# Patient Record
Sex: FEMALE | NOT HISPANIC OR LATINO | Employment: OTHER | URBAN - METROPOLITAN AREA
[De-identification: names, ages, dates, MRNs, and addresses within clinical notes are randomized per-mention and may not be internally consistent; named-entity substitution may affect disease eponyms.]

---

## 2017-03-07 ENCOUNTER — OFFICE VISIT - HEALTHEAST (OUTPATIENT)
Dept: FAMILY MEDICINE | Facility: CLINIC | Age: 82
End: 2017-03-07

## 2017-03-07 DIAGNOSIS — E11.9 TYPE 2 DIABETES MELLITUS (H): ICD-10-CM

## 2017-03-07 DIAGNOSIS — I10 ESSENTIAL HYPERTENSION, BENIGN: ICD-10-CM

## 2017-03-07 DIAGNOSIS — K21.9 GERD (GASTROESOPHAGEAL REFLUX DISEASE): ICD-10-CM

## 2017-03-07 DIAGNOSIS — E78.5 HYPERLIPIDEMIA: ICD-10-CM

## 2017-03-07 LAB — HBA1C MFR BLD: 6.5 % (ref 3.5–6)

## 2017-03-09 ENCOUNTER — COMMUNICATION - HEALTHEAST (OUTPATIENT)
Dept: FAMILY MEDICINE | Facility: CLINIC | Age: 82
End: 2017-03-09

## 2017-03-21 ENCOUNTER — RECORDS - HEALTHEAST (OUTPATIENT)
Dept: ADMINISTRATIVE | Facility: OTHER | Age: 82
End: 2017-03-21

## 2017-04-14 ENCOUNTER — COMMUNICATION - HEALTHEAST (OUTPATIENT)
Dept: TELEHEALTH | Facility: CLINIC | Age: 82
End: 2017-04-14

## 2017-04-14 ENCOUNTER — RECORDS - HEALTHEAST (OUTPATIENT)
Dept: GENERAL RADIOLOGY | Facility: CLINIC | Age: 82
End: 2017-04-14

## 2017-04-14 ENCOUNTER — OFFICE VISIT - HEALTHEAST (OUTPATIENT)
Dept: FAMILY MEDICINE | Facility: CLINIC | Age: 82
End: 2017-04-14

## 2017-04-14 DIAGNOSIS — M25.542 PAIN IN THUMB JOINT WITH MOVEMENT, LEFT: ICD-10-CM

## 2017-04-14 DIAGNOSIS — M79.645 PAIN IN LEFT FINGER(S): ICD-10-CM

## 2017-04-17 ENCOUNTER — COMMUNICATION - HEALTHEAST (OUTPATIENT)
Dept: FAMILY MEDICINE | Facility: CLINIC | Age: 82
End: 2017-04-17

## 2017-05-09 ENCOUNTER — OFFICE VISIT - HEALTHEAST (OUTPATIENT)
Dept: FAMILY MEDICINE | Facility: CLINIC | Age: 82
End: 2017-05-09

## 2017-05-09 ENCOUNTER — COMMUNICATION - HEALTHEAST (OUTPATIENT)
Dept: FAMILY MEDICINE | Facility: CLINIC | Age: 82
End: 2017-05-09

## 2017-05-09 DIAGNOSIS — M79.645 THUMB PAIN, LEFT: ICD-10-CM

## 2017-09-14 ENCOUNTER — COMMUNICATION - HEALTHEAST (OUTPATIENT)
Dept: FAMILY MEDICINE | Facility: CLINIC | Age: 82
End: 2017-09-14

## 2017-09-14 DIAGNOSIS — K21.9 GERD (GASTROESOPHAGEAL REFLUX DISEASE): ICD-10-CM

## 2017-12-12 ENCOUNTER — OFFICE VISIT - HEALTHEAST (OUTPATIENT)
Dept: FAMILY MEDICINE | Facility: CLINIC | Age: 82
End: 2017-12-12

## 2017-12-12 DIAGNOSIS — H57.89 EYE REDNESS: ICD-10-CM

## 2017-12-12 ASSESSMENT — MIFFLIN-ST. JEOR: SCORE: 833.6

## 2017-12-18 ENCOUNTER — COMMUNICATION - HEALTHEAST (OUTPATIENT)
Dept: FAMILY MEDICINE | Facility: CLINIC | Age: 82
End: 2017-12-18

## 2017-12-18 DIAGNOSIS — I10 ESSENTIAL HYPERTENSION, BENIGN: ICD-10-CM

## 2017-12-18 DIAGNOSIS — E11.9 DIABETES (H): ICD-10-CM

## 2017-12-18 DIAGNOSIS — E78.5 HYPERLIPIDEMIA: ICD-10-CM

## 2018-02-06 ENCOUNTER — COMMUNICATION - HEALTHEAST (OUTPATIENT)
Dept: FAMILY MEDICINE | Facility: CLINIC | Age: 83
End: 2018-02-06

## 2018-02-06 ENCOUNTER — OFFICE VISIT - HEALTHEAST (OUTPATIENT)
Dept: FAMILY MEDICINE | Facility: CLINIC | Age: 83
End: 2018-02-06

## 2018-02-06 DIAGNOSIS — E78.5 HYPERLIPIDEMIA, UNSPECIFIED HYPERLIPIDEMIA TYPE: ICD-10-CM

## 2018-02-06 DIAGNOSIS — K21.9 GASTROESOPHAGEAL REFLUX DISEASE WITHOUT ESOPHAGITIS: ICD-10-CM

## 2018-02-06 DIAGNOSIS — I25.10 CORONARY ATHEROSCLEROSIS: ICD-10-CM

## 2018-02-06 DIAGNOSIS — I10 ESSENTIAL HYPERTENSION, BENIGN: ICD-10-CM

## 2018-02-06 DIAGNOSIS — Z00.00 PREVENTATIVE HEALTH CARE: ICD-10-CM

## 2018-02-06 DIAGNOSIS — E11.9 DIABETES (H): ICD-10-CM

## 2018-02-06 DIAGNOSIS — E11.9 TYPE 2 DIABETES MELLITUS (H): ICD-10-CM

## 2018-02-06 DIAGNOSIS — M81.0 OSTEOPOROSIS: ICD-10-CM

## 2018-02-06 DIAGNOSIS — K21.00 REFLUX ESOPHAGITIS: ICD-10-CM

## 2018-02-06 DIAGNOSIS — K21.9 GERD (GASTROESOPHAGEAL REFLUX DISEASE): ICD-10-CM

## 2018-02-06 LAB
25(OH)D3 SERPL-MCNC: 25.8 NG/ML (ref 30–80)
ALBUMIN SERPL-MCNC: 3.3 G/DL (ref 3.5–5)
ALP SERPL-CCNC: 43 U/L (ref 45–120)
ALT SERPL W P-5'-P-CCNC: 16 U/L (ref 0–45)
ANION GAP SERPL CALCULATED.3IONS-SCNC: 11 MMOL/L (ref 5–18)
AST SERPL W P-5'-P-CCNC: 20 U/L (ref 0–40)
BILIRUB SERPL-MCNC: 0.4 MG/DL (ref 0–1)
BUN SERPL-MCNC: 16 MG/DL (ref 8–28)
CALCIUM SERPL-MCNC: 9.3 MG/DL (ref 8.5–10.5)
CHLORIDE BLD-SCNC: 104 MMOL/L (ref 98–107)
CHOLEST SERPL-MCNC: 130 MG/DL
CO2 SERPL-SCNC: 26 MMOL/L (ref 22–31)
CREAT SERPL-MCNC: 1 MG/DL (ref 0.6–1.1)
CREAT UR-MCNC: 94.5 MG/DL
FASTING STATUS PATIENT QL REPORTED: NO
GFR SERPL CREATININE-BSD FRML MDRD: 53 ML/MIN/1.73M2
GLUCOSE BLD-MCNC: 142 MG/DL (ref 70–125)
HBA1C MFR BLD: 6.4 % (ref 3.5–6)
HDLC SERPL-MCNC: 43 MG/DL
LDLC SERPL CALC-MCNC: 60 MG/DL
MICROALBUMIN UR-MCNC: 1.46 MG/DL (ref 0–1.99)
MICROALBUMIN/CREAT UR: 15.4 MG/G
POTASSIUM BLD-SCNC: 4.2 MMOL/L (ref 3.5–5)
PROT SERPL-MCNC: 7.7 G/DL (ref 6–8)
SODIUM SERPL-SCNC: 141 MMOL/L (ref 136–145)
TRIGL SERPL-MCNC: 134 MG/DL

## 2018-08-06 ENCOUNTER — OFFICE VISIT - HEALTHEAST (OUTPATIENT)
Dept: FAMILY MEDICINE | Facility: CLINIC | Age: 83
End: 2018-08-06

## 2018-08-06 DIAGNOSIS — I10 ESSENTIAL HYPERTENSION, BENIGN: ICD-10-CM

## 2018-08-06 DIAGNOSIS — E11.9 TYPE 2 DIABETES MELLITUS (H): ICD-10-CM

## 2018-08-06 DIAGNOSIS — K21.9 GASTROESOPHAGEAL REFLUX DISEASE WITHOUT ESOPHAGITIS: ICD-10-CM

## 2018-08-06 DIAGNOSIS — I25.83 CORONARY ATHEROSCLEROSIS DUE TO LIPID RICH PLAQUE: ICD-10-CM

## 2018-08-06 DIAGNOSIS — E78.5 HYPERLIPIDEMIA, UNSPECIFIED HYPERLIPIDEMIA TYPE: ICD-10-CM

## 2018-08-06 DIAGNOSIS — M81.0 OSTEOPOROSIS: ICD-10-CM

## 2018-08-06 DIAGNOSIS — H91.90 HARD OF HEARING: ICD-10-CM

## 2018-08-06 LAB — HBA1C MFR BLD: 7.4 % (ref 3.5–6)

## 2018-08-07 LAB
ANION GAP SERPL CALCULATED.3IONS-SCNC: 9 MMOL/L (ref 5–18)
BUN SERPL-MCNC: 13 MG/DL (ref 8–28)
CALCIUM SERPL-MCNC: 9.7 MG/DL (ref 8.5–10.5)
CHLORIDE BLD-SCNC: 106 MMOL/L (ref 98–107)
CHOLEST SERPL-MCNC: 141 MG/DL
CO2 SERPL-SCNC: 27 MMOL/L (ref 22–31)
CREAT SERPL-MCNC: 1.03 MG/DL (ref 0.6–1.1)
FASTING STATUS PATIENT QL REPORTED: NO
GFR SERPL CREATININE-BSD FRML MDRD: 51 ML/MIN/1.73M2
GLUCOSE BLD-MCNC: 152 MG/DL (ref 70–125)
HDLC SERPL-MCNC: 40 MG/DL
LDLC SERPL CALC-MCNC: 65 MG/DL
POTASSIUM BLD-SCNC: 4.4 MMOL/L (ref 3.5–5)
SODIUM SERPL-SCNC: 142 MMOL/L (ref 136–145)
TRIGL SERPL-MCNC: 180 MG/DL

## 2018-08-08 LAB — 25(OH)D3 SERPL-MCNC: 81.1 NG/ML (ref 30–80)

## 2018-08-10 ENCOUNTER — COMMUNICATION - HEALTHEAST (OUTPATIENT)
Dept: FAMILY MEDICINE | Facility: CLINIC | Age: 83
End: 2018-08-10

## 2018-08-13 ENCOUNTER — COMMUNICATION - HEALTHEAST (OUTPATIENT)
Dept: ADMINISTRATIVE | Facility: CLINIC | Age: 83
End: 2018-08-13

## 2018-09-05 ENCOUNTER — OFFICE VISIT - HEALTHEAST (OUTPATIENT)
Dept: FAMILY MEDICINE | Facility: CLINIC | Age: 83
End: 2018-09-05

## 2018-09-05 DIAGNOSIS — I10 ESSENTIAL HYPERTENSION, BENIGN: ICD-10-CM

## 2018-09-05 DIAGNOSIS — E11.9 DIABETES (H): ICD-10-CM

## 2018-09-05 DIAGNOSIS — E78.5 HYPERLIPIDEMIA, UNSPECIFIED HYPERLIPIDEMIA TYPE: ICD-10-CM

## 2018-09-05 DIAGNOSIS — M25.519 SHOULDER PAIN: ICD-10-CM

## 2018-09-05 DIAGNOSIS — K21.9 GASTROESOPHAGEAL REFLUX DISEASE WITHOUT ESOPHAGITIS: ICD-10-CM

## 2018-09-05 ASSESSMENT — MIFFLIN-ST. JEOR: SCORE: 866.49

## 2018-10-03 ENCOUNTER — OFFICE VISIT - HEALTHEAST (OUTPATIENT)
Dept: FAMILY MEDICINE | Facility: CLINIC | Age: 83
End: 2018-10-03

## 2018-10-03 DIAGNOSIS — R05.9 COUGH: ICD-10-CM

## 2018-10-03 DIAGNOSIS — H61.21 IMPACTED CERUMEN OF RIGHT EAR: ICD-10-CM

## 2018-10-03 DIAGNOSIS — R53.83 FATIGUE: ICD-10-CM

## 2018-10-03 LAB
BASOPHILS # BLD AUTO: 0 THOU/UL (ref 0–0.2)
BASOPHILS NFR BLD AUTO: 0 % (ref 0–2)
EOSINOPHIL # BLD AUTO: 0.2 THOU/UL (ref 0–0.4)
EOSINOPHIL NFR BLD AUTO: 3 % (ref 0–6)
ERYTHROCYTE [DISTWIDTH] IN BLOOD BY AUTOMATED COUNT: 17.6 % (ref 11–14.5)
HCT VFR BLD AUTO: 31.8 % (ref 35–47)
HGB BLD-MCNC: 10.1 G/DL (ref 12–16)
LYMPHOCYTES # BLD AUTO: 1.7 THOU/UL (ref 0.8–4.4)
LYMPHOCYTES NFR BLD AUTO: 27 % (ref 20–40)
MCH RBC QN AUTO: 21.3 PG (ref 27–34)
MCHC RBC AUTO-ENTMCNC: 31.7 G/DL (ref 32–36)
MCV RBC AUTO: 67 FL (ref 80–100)
MONOCYTES # BLD AUTO: 0.6 THOU/UL (ref 0–0.9)
MONOCYTES NFR BLD AUTO: 10 % (ref 2–10)
NEUTROPHILS # BLD AUTO: 3.8 THOU/UL (ref 2–7.7)
NEUTROPHILS NFR BLD AUTO: 60 % (ref 50–70)
PLATELET # BLD AUTO: 254 THOU/UL (ref 140–440)
PMV BLD AUTO: 7.6 FL (ref 7–10)
RBC # BLD AUTO: 4.71 MILL/UL (ref 3.8–5.4)
WBC: 6.4 THOU/UL (ref 4–11)

## 2018-11-20 ENCOUNTER — OFFICE VISIT - HEALTHEAST (OUTPATIENT)
Dept: FAMILY MEDICINE | Facility: CLINIC | Age: 83
End: 2018-11-20

## 2018-11-20 ENCOUNTER — COMMUNICATION - HEALTHEAST (OUTPATIENT)
Dept: FAMILY MEDICINE | Facility: CLINIC | Age: 83
End: 2018-11-20

## 2018-11-20 DIAGNOSIS — E78.5 HYPERLIPIDEMIA, UNSPECIFIED HYPERLIPIDEMIA TYPE: ICD-10-CM

## 2018-11-20 DIAGNOSIS — R42 DIZZY SPELLS: ICD-10-CM

## 2018-11-20 DIAGNOSIS — H92.02 LEFT EAR PAIN: ICD-10-CM

## 2018-11-20 DIAGNOSIS — I25.83 CORONARY ATHEROSCLEROSIS DUE TO LIPID RICH PLAQUE: ICD-10-CM

## 2018-11-20 DIAGNOSIS — I10 ESSENTIAL HYPERTENSION, BENIGN: ICD-10-CM

## 2018-11-20 DIAGNOSIS — E11.9 TYPE 2 DIABETES MELLITUS WITHOUT COMPLICATION, WITHOUT LONG-TERM CURRENT USE OF INSULIN (H): ICD-10-CM

## 2018-11-20 LAB
ALBUMIN SERPL-MCNC: 3.2 G/DL (ref 3.5–5)
ALP SERPL-CCNC: 49 U/L (ref 45–120)
ALT SERPL W P-5'-P-CCNC: 22 U/L (ref 0–45)
ANION GAP SERPL CALCULATED.3IONS-SCNC: 11 MMOL/L (ref 5–18)
AST SERPL W P-5'-P-CCNC: 27 U/L (ref 0–40)
BASOPHILS # BLD AUTO: 0 THOU/UL (ref 0–0.2)
BASOPHILS NFR BLD AUTO: 0 % (ref 0–2)
BILIRUB SERPL-MCNC: 0.4 MG/DL (ref 0–1)
BUN SERPL-MCNC: 12 MG/DL (ref 8–28)
CALCIUM SERPL-MCNC: 9.5 MG/DL (ref 8.5–10.5)
CHLORIDE BLD-SCNC: 104 MMOL/L (ref 98–107)
CO2 SERPL-SCNC: 25 MMOL/L (ref 22–31)
CREAT SERPL-MCNC: 0.93 MG/DL (ref 0.6–1.1)
EOSINOPHIL # BLD AUTO: 0.1 THOU/UL (ref 0–0.4)
EOSINOPHIL NFR BLD AUTO: 2 % (ref 0–6)
ERYTHROCYTE [DISTWIDTH] IN BLOOD BY AUTOMATED COUNT: 17.8 % (ref 11–14.5)
ERYTHROCYTE [SEDIMENTATION RATE] IN BLOOD BY WESTERGREN METHOD: 25 MM/HR (ref 0–20)
GFR SERPL CREATININE-BSD FRML MDRD: 58 ML/MIN/1.73M2
GLUCOSE BLD-MCNC: 205 MG/DL (ref 70–125)
HBA1C MFR BLD: 7.3 % (ref 3.5–6)
HCT VFR BLD AUTO: 30.7 % (ref 35–47)
HGB BLD-MCNC: 9.5 G/DL (ref 12–16)
LYMPHOCYTES # BLD AUTO: 1.4 THOU/UL (ref 0.8–4.4)
LYMPHOCYTES NFR BLD AUTO: 32 % (ref 20–40)
MCH RBC QN AUTO: 20.9 PG (ref 27–34)
MCHC RBC AUTO-ENTMCNC: 30.8 G/DL (ref 32–36)
MCV RBC AUTO: 68 FL (ref 80–100)
MONOCYTES # BLD AUTO: 0.3 THOU/UL (ref 0–0.9)
MONOCYTES NFR BLD AUTO: 7 % (ref 2–10)
NEUTROPHILS # BLD AUTO: 2.6 THOU/UL (ref 2–7.7)
NEUTROPHILS NFR BLD AUTO: 59 % (ref 50–70)
PLATELET # BLD AUTO: 232 THOU/UL (ref 140–440)
PMV BLD AUTO: 7.9 FL (ref 7–10)
POTASSIUM BLD-SCNC: 4.3 MMOL/L (ref 3.5–5)
PROT SERPL-MCNC: 7.8 G/DL (ref 6–8)
RBC # BLD AUTO: 4.52 MILL/UL (ref 3.8–5.4)
SODIUM SERPL-SCNC: 140 MMOL/L (ref 136–145)
WBC: 4.4 THOU/UL (ref 4–11)

## 2018-11-28 ENCOUNTER — OFFICE VISIT - HEALTHEAST (OUTPATIENT)
Dept: FAMILY MEDICINE | Facility: CLINIC | Age: 83
End: 2018-11-28

## 2018-11-28 DIAGNOSIS — K05.219 GUM ABSCESS: ICD-10-CM

## 2018-11-28 DIAGNOSIS — H91.93 BILATERAL HEARING LOSS, UNSPECIFIED HEARING LOSS TYPE: ICD-10-CM

## 2018-11-28 DIAGNOSIS — K13.79 ORAL PAIN: ICD-10-CM

## 2018-11-28 ASSESSMENT — MIFFLIN-ST. JEOR: SCORE: 860.25

## 2018-12-02 ENCOUNTER — COMMUNICATION - HEALTHEAST (OUTPATIENT)
Dept: FAMILY MEDICINE | Facility: CLINIC | Age: 83
End: 2018-12-02

## 2018-12-02 DIAGNOSIS — I25.83 CORONARY ATHEROSCLEROSIS DUE TO LIPID RICH PLAQUE: ICD-10-CM

## 2018-12-04 ENCOUNTER — OFFICE VISIT - HEALTHEAST (OUTPATIENT)
Dept: AUDIOLOGY | Facility: CLINIC | Age: 83
End: 2018-12-04

## 2018-12-04 DIAGNOSIS — H90.3 SENSORINEURAL HEARING LOSS, BILATERAL: ICD-10-CM

## 2019-03-13 ENCOUNTER — COMMUNICATION - HEALTHEAST (OUTPATIENT)
Dept: FAMILY MEDICINE | Facility: CLINIC | Age: 84
End: 2019-03-13

## 2019-03-13 DIAGNOSIS — I10 ESSENTIAL HYPERTENSION, BENIGN: ICD-10-CM

## 2019-03-13 DIAGNOSIS — E78.5 HYPERLIPIDEMIA, UNSPECIFIED HYPERLIPIDEMIA TYPE: ICD-10-CM

## 2019-03-13 DIAGNOSIS — E11.9 DIABETES (H): ICD-10-CM

## 2019-04-09 ENCOUNTER — OFFICE VISIT - HEALTHEAST (OUTPATIENT)
Dept: FAMILY MEDICINE | Facility: CLINIC | Age: 84
End: 2019-04-09

## 2019-04-09 DIAGNOSIS — E11.9 TYPE 2 DIABETES MELLITUS WITHOUT COMPLICATION, WITHOUT LONG-TERM CURRENT USE OF INSULIN (H): ICD-10-CM

## 2019-04-09 DIAGNOSIS — K21.9 GASTROESOPHAGEAL REFLUX DISEASE WITHOUT ESOPHAGITIS: ICD-10-CM

## 2019-04-09 DIAGNOSIS — I25.83 CORONARY ATHEROSCLEROSIS DUE TO LIPID RICH PLAQUE: ICD-10-CM

## 2019-04-09 DIAGNOSIS — I10 ESSENTIAL HYPERTENSION, BENIGN: ICD-10-CM

## 2019-04-09 DIAGNOSIS — E78.5 HYPERLIPIDEMIA, UNSPECIFIED HYPERLIPIDEMIA TYPE: ICD-10-CM

## 2019-04-09 LAB
CREAT UR-MCNC: 112.8 MG/DL
ERYTHROCYTE [DISTWIDTH] IN BLOOD BY AUTOMATED COUNT: 18.1 % (ref 11–14.5)
HBA1C MFR BLD: 8 % (ref 3.5–6)
HCT VFR BLD AUTO: 30 % (ref 35–47)
HGB BLD-MCNC: 9.1 G/DL (ref 12–16)
MCH RBC QN AUTO: 21.2 PG (ref 27–34)
MCHC RBC AUTO-ENTMCNC: 30.3 G/DL (ref 32–36)
MCV RBC AUTO: 70 FL (ref 80–100)
MICROALBUMIN UR-MCNC: 1.02 MG/DL (ref 0–1.99)
MICROALBUMIN/CREAT UR: 9 MG/G
PLATELET # BLD AUTO: 244 THOU/UL (ref 140–440)
PMV BLD AUTO: 8.1 FL (ref 7–10)
RBC # BLD AUTO: 4.28 MILL/UL (ref 3.8–5.4)
WBC: 5.9 THOU/UL (ref 4–11)

## 2019-04-10 LAB
ANION GAP SERPL CALCULATED.3IONS-SCNC: 8 MMOL/L (ref 5–18)
BUN SERPL-MCNC: 12 MG/DL (ref 8–28)
CALCIUM SERPL-MCNC: 9.4 MG/DL (ref 8.5–10.5)
CHLORIDE BLD-SCNC: 105 MMOL/L (ref 98–107)
CHOLEST SERPL-MCNC: 134 MG/DL
CO2 SERPL-SCNC: 26 MMOL/L (ref 22–31)
CREAT SERPL-MCNC: 1.05 MG/DL (ref 0.6–1.1)
FASTING STATUS PATIENT QL REPORTED: NO
GFR SERPL CREATININE-BSD FRML MDRD: 50 ML/MIN/1.73M2
GLUCOSE BLD-MCNC: 179 MG/DL (ref 70–125)
HDLC SERPL-MCNC: 34 MG/DL
LDLC SERPL CALC-MCNC: 47 MG/DL
POTASSIUM BLD-SCNC: 4.1 MMOL/L (ref 3.5–5)
SODIUM SERPL-SCNC: 139 MMOL/L (ref 136–145)
TRIGL SERPL-MCNC: 264 MG/DL

## 2019-04-11 ENCOUNTER — COMMUNICATION - HEALTHEAST (OUTPATIENT)
Dept: FAMILY MEDICINE | Facility: CLINIC | Age: 84
End: 2019-04-11

## 2019-05-31 ENCOUNTER — COMMUNICATION - HEALTHEAST (OUTPATIENT)
Dept: FAMILY MEDICINE | Facility: CLINIC | Age: 84
End: 2019-05-31

## 2019-06-14 ENCOUNTER — OFFICE VISIT - HEALTHEAST (OUTPATIENT)
Dept: FAMILY MEDICINE | Facility: CLINIC | Age: 84
End: 2019-06-14

## 2019-06-14 ENCOUNTER — HOSPITAL ENCOUNTER (OUTPATIENT)
Dept: CT IMAGING | Facility: CLINIC | Age: 84
Discharge: HOME | End: 2019-06-14
Attending: FAMILY MEDICINE

## 2019-06-14 DIAGNOSIS — R19.8 INCREASED ABDOMINAL GIRTH: ICD-10-CM

## 2019-06-14 DIAGNOSIS — D50.0 IRON DEFICIENCY ANEMIA DUE TO CHRONIC BLOOD LOSS: ICD-10-CM

## 2019-06-14 DIAGNOSIS — I25.83 CORONARY ATHEROSCLEROSIS DUE TO LIPID RICH PLAQUE: ICD-10-CM

## 2019-06-14 DIAGNOSIS — E11.9 TYPE 2 DIABETES MELLITUS WITHOUT COMPLICATION, WITHOUT LONG-TERM CURRENT USE OF INSULIN (H): ICD-10-CM

## 2019-06-14 DIAGNOSIS — K21.9 GASTROESOPHAGEAL REFLUX DISEASE WITHOUT ESOPHAGITIS: ICD-10-CM

## 2019-06-14 DIAGNOSIS — K74.60 CIRRHOSIS OF LIVER WITHOUT ASCITES, UNSPECIFIED HEPATIC CIRRHOSIS TYPE (H): ICD-10-CM

## 2019-06-14 DIAGNOSIS — E78.5 HYPERLIPIDEMIA, UNSPECIFIED HYPERLIPIDEMIA TYPE: ICD-10-CM

## 2019-06-14 DIAGNOSIS — R19.4 CHANGE IN STOOL HABITS: ICD-10-CM

## 2019-06-14 DIAGNOSIS — I10 ESSENTIAL HYPERTENSION, BENIGN: ICD-10-CM

## 2019-06-14 LAB
CREAT BLD-MCNC: 0.8 MG/DL (ref 0.6–1.1)
ERYTHROCYTE [DISTWIDTH] IN BLOOD BY AUTOMATED COUNT: 20.7 % (ref 11–14.5)
GFR SERPL CREATININE-BSD FRML MDRD: >60 ML/MIN/1.73M2
HBA1C MFR BLD: 7.5 % (ref 3.5–6)
HCT VFR BLD AUTO: 31.7 % (ref 35–47)
HGB BLD-MCNC: 9.3 G/DL (ref 12–16)
MCH RBC QN AUTO: 20.8 PG (ref 27–34)
MCHC RBC AUTO-ENTMCNC: 29.3 G/DL (ref 32–36)
MCV RBC AUTO: 71 FL (ref 80–100)
PLATELET # BLD AUTO: 218 THOU/UL (ref 140–440)
PMV BLD AUTO: 9.6 FL (ref 8.5–12.5)
RBC # BLD AUTO: 4.48 MILL/UL (ref 3.8–5.4)
WBC: 6.2 THOU/UL (ref 4–11)

## 2019-06-14 ASSESSMENT — MIFFLIN-ST. JEOR: SCORE: 863.66

## 2019-06-16 ENCOUNTER — COMMUNICATION - HEALTHEAST (OUTPATIENT)
Dept: FAMILY MEDICINE | Facility: CLINIC | Age: 84
End: 2019-06-16

## 2019-06-17 ENCOUNTER — AMBULATORY - HEALTHEAST (OUTPATIENT)
Dept: FAMILY MEDICINE | Facility: CLINIC | Age: 84
End: 2019-06-17

## 2019-06-17 DIAGNOSIS — K59.00 CONSTIPATION, UNSPECIFIED CONSTIPATION TYPE: ICD-10-CM

## 2019-08-02 ENCOUNTER — OFFICE VISIT - HEALTHEAST (OUTPATIENT)
Dept: FAMILY MEDICINE | Facility: CLINIC | Age: 84
End: 2019-08-02

## 2019-08-02 DIAGNOSIS — E11.9 TYPE 2 DIABETES MELLITUS WITHOUT COMPLICATION, WITHOUT LONG-TERM CURRENT USE OF INSULIN (H): ICD-10-CM

## 2019-08-02 DIAGNOSIS — H57.12 LEFT EYE PAIN: ICD-10-CM

## 2019-08-12 ENCOUNTER — RECORDS - HEALTHEAST (OUTPATIENT)
Dept: ADMINISTRATIVE | Facility: OTHER | Age: 84
End: 2019-08-12

## 2019-10-26 ENCOUNTER — AMBULATORY - HEALTHEAST (OUTPATIENT)
Dept: NURSING | Facility: CLINIC | Age: 84
End: 2019-10-26

## 2019-10-26 DIAGNOSIS — Z23 NEED FOR VACCINATION: ICD-10-CM

## 2019-11-01 ENCOUNTER — OFFICE VISIT - HEALTHEAST (OUTPATIENT)
Dept: FAMILY MEDICINE | Facility: CLINIC | Age: 84
End: 2019-11-01

## 2019-11-01 DIAGNOSIS — I10 ESSENTIAL HYPERTENSION, BENIGN: ICD-10-CM

## 2019-11-01 DIAGNOSIS — M81.0 AGE-RELATED OSTEOPOROSIS WITHOUT CURRENT PATHOLOGICAL FRACTURE: ICD-10-CM

## 2019-11-01 DIAGNOSIS — I25.83 CORONARY ATHEROSCLEROSIS DUE TO LIPID RICH PLAQUE: ICD-10-CM

## 2019-11-01 DIAGNOSIS — E11.9 TYPE 2 DIABETES MELLITUS WITHOUT COMPLICATION, WITHOUT LONG-TERM CURRENT USE OF INSULIN (H): ICD-10-CM

## 2019-11-01 DIAGNOSIS — E78.5 HYPERLIPIDEMIA, UNSPECIFIED HYPERLIPIDEMIA TYPE: ICD-10-CM

## 2019-11-01 DIAGNOSIS — K21.9 GASTROESOPHAGEAL REFLUX DISEASE WITHOUT ESOPHAGITIS: ICD-10-CM

## 2019-11-01 LAB
ANION GAP SERPL CALCULATED.3IONS-SCNC: 9 MMOL/L (ref 5–18)
BUN SERPL-MCNC: 12 MG/DL (ref 8–28)
CALCIUM SERPL-MCNC: 9.7 MG/DL (ref 8.5–10.5)
CHLORIDE BLD-SCNC: 105 MMOL/L (ref 98–107)
CHOLEST SERPL-MCNC: 129 MG/DL
CO2 SERPL-SCNC: 27 MMOL/L (ref 22–31)
CREAT SERPL-MCNC: 0.94 MG/DL (ref 0.6–1.1)
FASTING STATUS PATIENT QL REPORTED: NO
GFR SERPL CREATININE-BSD FRML MDRD: 57 ML/MIN/1.73M2
GLUCOSE BLD-MCNC: 135 MG/DL (ref 70–125)
HBA1C MFR BLD: 7.7 % (ref 3.5–6)
HDLC SERPL-MCNC: 38 MG/DL
LDLC SERPL CALC-MCNC: 36 MG/DL
POTASSIUM BLD-SCNC: 4.3 MMOL/L (ref 3.5–5)
SODIUM SERPL-SCNC: 141 MMOL/L (ref 136–145)
TRIGL SERPL-MCNC: 276 MG/DL

## 2019-11-04 ENCOUNTER — COMMUNICATION - HEALTHEAST (OUTPATIENT)
Dept: FAMILY MEDICINE | Facility: CLINIC | Age: 84
End: 2019-11-04

## 2019-11-04 LAB — 25(OH)D3 SERPL-MCNC: 59.3 NG/ML (ref 30–80)

## 2019-11-05 ENCOUNTER — COMMUNICATION - HEALTHEAST (OUTPATIENT)
Dept: SCHEDULING | Facility: CLINIC | Age: 84
End: 2019-11-05

## 2019-11-05 ENCOUNTER — COMMUNICATION - HEALTHEAST (OUTPATIENT)
Dept: FAMILY MEDICINE | Facility: CLINIC | Age: 84
End: 2019-11-05

## 2019-11-05 DIAGNOSIS — K21.9 GASTROESOPHAGEAL REFLUX DISEASE WITHOUT ESOPHAGITIS: ICD-10-CM

## 2020-02-15 ENCOUNTER — COMMUNICATION - HEALTHEAST (OUTPATIENT)
Dept: FAMILY MEDICINE | Facility: CLINIC | Age: 85
End: 2020-02-15

## 2020-02-15 DIAGNOSIS — E78.5 HYPERLIPIDEMIA, UNSPECIFIED HYPERLIPIDEMIA TYPE: ICD-10-CM

## 2020-02-15 DIAGNOSIS — E11.9 DIABETES (H): ICD-10-CM

## 2020-02-15 DIAGNOSIS — I10 ESSENTIAL HYPERTENSION, BENIGN: ICD-10-CM

## 2020-02-28 ENCOUNTER — OFFICE VISIT - HEALTHEAST (OUTPATIENT)
Dept: FAMILY MEDICINE | Facility: CLINIC | Age: 85
End: 2020-02-28

## 2020-02-28 DIAGNOSIS — E11.9 TYPE 2 DIABETES MELLITUS WITHOUT COMPLICATION, WITHOUT LONG-TERM CURRENT USE OF INSULIN (H): ICD-10-CM

## 2020-02-28 DIAGNOSIS — I25.83 CORONARY ATHEROSCLEROSIS DUE TO LIPID RICH PLAQUE: ICD-10-CM

## 2020-02-28 DIAGNOSIS — I10 ESSENTIAL HYPERTENSION, BENIGN: ICD-10-CM

## 2020-02-28 DIAGNOSIS — J06.9 VIRAL UPPER RESPIRATORY TRACT INFECTION: ICD-10-CM

## 2020-02-28 LAB
CREAT UR-MCNC: 128.9 MG/DL
HBA1C MFR BLD: 7.5 % (ref 3.5–6)
MICROALBUMIN UR-MCNC: 1.77 MG/DL (ref 0–1.99)
MICROALBUMIN/CREAT UR: 13.7 MG/G

## 2020-02-29 ENCOUNTER — COMMUNICATION - HEALTHEAST (OUTPATIENT)
Dept: FAMILY MEDICINE | Facility: CLINIC | Age: 85
End: 2020-02-29

## 2020-05-08 ENCOUNTER — COMMUNICATION - HEALTHEAST (OUTPATIENT)
Dept: FAMILY MEDICINE | Facility: CLINIC | Age: 85
End: 2020-05-08

## 2020-05-08 DIAGNOSIS — K21.9 GASTROESOPHAGEAL REFLUX DISEASE WITHOUT ESOPHAGITIS: ICD-10-CM

## 2020-05-14 ENCOUNTER — COMMUNICATION - HEALTHEAST (OUTPATIENT)
Dept: FAMILY MEDICINE | Facility: CLINIC | Age: 85
End: 2020-05-14

## 2020-05-14 DIAGNOSIS — E11.9 DIABETES (H): ICD-10-CM

## 2020-05-14 DIAGNOSIS — E78.5 HYPERLIPIDEMIA, UNSPECIFIED HYPERLIPIDEMIA TYPE: ICD-10-CM

## 2020-07-09 ENCOUNTER — OFFICE VISIT - HEALTHEAST (OUTPATIENT)
Dept: FAMILY MEDICINE | Facility: CLINIC | Age: 85
End: 2020-07-09

## 2020-07-09 DIAGNOSIS — I25.83 CORONARY ATHEROSCLEROSIS DUE TO LIPID RICH PLAQUE: ICD-10-CM

## 2020-07-09 DIAGNOSIS — I10 ESSENTIAL HYPERTENSION, BENIGN: ICD-10-CM

## 2020-07-09 DIAGNOSIS — R06.02 SOB (SHORTNESS OF BREATH) ON EXERTION: ICD-10-CM

## 2020-07-09 DIAGNOSIS — E11.9 TYPE 2 DIABETES MELLITUS WITHOUT COMPLICATION, WITHOUT LONG-TERM CURRENT USE OF INSULIN (H): ICD-10-CM

## 2020-07-09 DIAGNOSIS — K21.9 GASTROESOPHAGEAL REFLUX DISEASE WITHOUT ESOPHAGITIS: ICD-10-CM

## 2020-07-09 DIAGNOSIS — E78.5 HYPERLIPIDEMIA, UNSPECIFIED HYPERLIPIDEMIA TYPE: ICD-10-CM

## 2020-07-09 LAB
BNP SERPL-MCNC: 128 PG/ML (ref 0–167)
HBA1C MFR BLD: 7.6 % (ref 3.5–6)

## 2020-07-09 ASSESSMENT — MIFFLIN-ST. JEOR: SCORE: 859.12

## 2020-07-10 LAB
ALBUMIN SERPL-MCNC: 3.4 G/DL (ref 3.5–5)
ALP SERPL-CCNC: 66 U/L (ref 45–120)
ALT SERPL W P-5'-P-CCNC: 25 U/L (ref 0–45)
ANION GAP SERPL CALCULATED.3IONS-SCNC: 12 MMOL/L (ref 5–18)
AST SERPL W P-5'-P-CCNC: 34 U/L (ref 0–40)
BILIRUB SERPL-MCNC: 0.4 MG/DL (ref 0–1)
BUN SERPL-MCNC: 13 MG/DL (ref 8–28)
CALCIUM SERPL-MCNC: 9.5 MG/DL (ref 8.5–10.5)
CHLORIDE BLD-SCNC: 104 MMOL/L (ref 98–107)
CHOLEST SERPL-MCNC: 133 MG/DL
CO2 SERPL-SCNC: 24 MMOL/L (ref 22–31)
CREAT SERPL-MCNC: 1.07 MG/DL (ref 0.6–1.1)
FASTING STATUS PATIENT QL REPORTED: NO
GFR SERPL CREATININE-BSD FRML MDRD: 49 ML/MIN/1.73M2
GLUCOSE BLD-MCNC: 169 MG/DL (ref 70–125)
HDLC SERPL-MCNC: 40 MG/DL
LDLC SERPL CALC-MCNC: 62 MG/DL
POTASSIUM BLD-SCNC: 4.4 MMOL/L (ref 3.5–5)
PROT SERPL-MCNC: 8 G/DL (ref 6–8)
SODIUM SERPL-SCNC: 140 MMOL/L (ref 136–145)
TRIGL SERPL-MCNC: 153 MG/DL

## 2020-07-11 ENCOUNTER — COMMUNICATION - HEALTHEAST (OUTPATIENT)
Dept: FAMILY MEDICINE | Facility: CLINIC | Age: 85
End: 2020-07-11

## 2020-09-12 ENCOUNTER — AMBULATORY - HEALTHEAST (OUTPATIENT)
Dept: NURSING | Facility: CLINIC | Age: 85
End: 2020-09-12

## 2020-10-19 ENCOUNTER — COMMUNICATION - HEALTHEAST (OUTPATIENT)
Dept: FAMILY MEDICINE | Facility: CLINIC | Age: 85
End: 2020-10-19

## 2020-10-19 DIAGNOSIS — I10 ESSENTIAL HYPERTENSION, BENIGN: ICD-10-CM

## 2020-11-03 ENCOUNTER — OFFICE VISIT - HEALTHEAST (OUTPATIENT)
Dept: FAMILY MEDICINE | Facility: CLINIC | Age: 85
End: 2020-11-03

## 2020-11-03 DIAGNOSIS — E11.9 TYPE 2 DIABETES MELLITUS WITHOUT COMPLICATION, WITHOUT LONG-TERM CURRENT USE OF INSULIN (H): ICD-10-CM

## 2020-11-03 DIAGNOSIS — K74.60 CIRRHOSIS OF LIVER WITHOUT ASCITES, UNSPECIFIED HEPATIC CIRRHOSIS TYPE (H): ICD-10-CM

## 2020-11-03 DIAGNOSIS — I25.83 CORONARY ATHEROSCLEROSIS DUE TO LIPID RICH PLAQUE: ICD-10-CM

## 2020-11-03 DIAGNOSIS — Z00.00 MEDICARE ANNUAL WELLNESS VISIT, SUBSEQUENT: ICD-10-CM

## 2020-11-03 DIAGNOSIS — K21.9 GASTROESOPHAGEAL REFLUX DISEASE WITHOUT ESOPHAGITIS: ICD-10-CM

## 2020-11-03 DIAGNOSIS — E78.5 HYPERLIPIDEMIA, UNSPECIFIED HYPERLIPIDEMIA TYPE: ICD-10-CM

## 2020-11-03 DIAGNOSIS — I10 ESSENTIAL HYPERTENSION, BENIGN: ICD-10-CM

## 2020-11-03 LAB — HBA1C MFR BLD: 7.8 %

## 2020-11-03 ASSESSMENT — MIFFLIN-ST. JEOR: SCORE: 847.78

## 2020-11-04 ENCOUNTER — COMMUNICATION - HEALTHEAST (OUTPATIENT)
Dept: FAMILY MEDICINE | Facility: CLINIC | Age: 85
End: 2020-11-04

## 2020-11-04 LAB
ANION GAP SERPL CALCULATED.3IONS-SCNC: 10 MMOL/L (ref 5–18)
BUN SERPL-MCNC: 13 MG/DL (ref 8–28)
CALCIUM SERPL-MCNC: 8.8 MG/DL (ref 8.5–10.5)
CHLORIDE BLD-SCNC: 105 MMOL/L (ref 98–107)
CO2 SERPL-SCNC: 25 MMOL/L (ref 22–31)
CREAT SERPL-MCNC: 1.02 MG/DL (ref 0.6–1.1)
GFR SERPL CREATININE-BSD FRML MDRD: 52 ML/MIN/1.73M2
GLUCOSE BLD-MCNC: 226 MG/DL (ref 70–125)
POTASSIUM BLD-SCNC: 3.8 MMOL/L (ref 3.5–5)
SODIUM SERPL-SCNC: 140 MMOL/L (ref 136–145)

## 2021-02-10 ENCOUNTER — AMBULATORY - HEALTHEAST (OUTPATIENT)
Dept: NURSING | Facility: CLINIC | Age: 86
End: 2021-02-10

## 2021-02-12 ENCOUNTER — RECORDS - HEALTHEAST (OUTPATIENT)
Dept: ADMINISTRATIVE | Facility: OTHER | Age: 86
End: 2021-02-12

## 2021-03-03 ENCOUNTER — AMBULATORY - HEALTHEAST (OUTPATIENT)
Dept: NURSING | Facility: CLINIC | Age: 86
End: 2021-03-03

## 2021-03-11 ENCOUNTER — OFFICE VISIT - HEALTHEAST (OUTPATIENT)
Dept: FAMILY MEDICINE | Facility: CLINIC | Age: 86
End: 2021-03-11

## 2021-03-11 DIAGNOSIS — R10.84 ABDOMINAL PAIN, GENERALIZED: ICD-10-CM

## 2021-03-11 DIAGNOSIS — I25.83 CORONARY ATHEROSCLEROSIS DUE TO LIPID RICH PLAQUE: ICD-10-CM

## 2021-03-11 DIAGNOSIS — E11.9 TYPE 2 DIABETES MELLITUS WITHOUT COMPLICATION, WITHOUT LONG-TERM CURRENT USE OF INSULIN (H): ICD-10-CM

## 2021-03-11 DIAGNOSIS — R14.0 ABDOMINAL BLOATING: ICD-10-CM

## 2021-03-11 DIAGNOSIS — I10 ESSENTIAL HYPERTENSION, BENIGN: ICD-10-CM

## 2021-03-11 DIAGNOSIS — K21.9 GASTROESOPHAGEAL REFLUX DISEASE WITHOUT ESOPHAGITIS: ICD-10-CM

## 2021-03-11 DIAGNOSIS — E78.5 HYPERLIPIDEMIA, UNSPECIFIED HYPERLIPIDEMIA TYPE: ICD-10-CM

## 2021-03-11 DIAGNOSIS — N18.31 STAGE 3A CHRONIC KIDNEY DISEASE (H): ICD-10-CM

## 2021-03-11 LAB
ANION GAP SERPL CALCULATED.3IONS-SCNC: 12 MMOL/L (ref 5–18)
BUN SERPL-MCNC: 15 MG/DL (ref 8–28)
CALCIUM SERPL-MCNC: 9 MG/DL (ref 8.5–10.5)
CHLORIDE BLD-SCNC: 102 MMOL/L (ref 98–107)
CHOLEST SERPL-MCNC: 141 MG/DL
CO2 SERPL-SCNC: 25 MMOL/L (ref 22–31)
CREAT SERPL-MCNC: 1.17 MG/DL (ref 0.6–1.1)
CREAT UR-MCNC: 109.8 MG/DL
ERYTHROCYTE [DISTWIDTH] IN BLOOD BY AUTOMATED COUNT: 16.7 % (ref 11–14.5)
FASTING STATUS PATIENT QL REPORTED: NO
GFR SERPL CREATININE-BSD FRML MDRD: 44 ML/MIN/1.73M2
GLUCOSE BLD-MCNC: 230 MG/DL (ref 70–125)
HBA1C MFR BLD: 7.7 %
HCT VFR BLD AUTO: 35.3 % (ref 35–47)
HDLC SERPL-MCNC: 42 MG/DL
HGB BLD-MCNC: 11.3 G/DL (ref 12–16)
LDLC SERPL CALC-MCNC: 54 MG/DL
MCH RBC QN AUTO: 25.8 PG (ref 27–34)
MCHC RBC AUTO-ENTMCNC: 32 G/DL (ref 32–36)
MCV RBC AUTO: 81 FL (ref 80–100)
MICROALBUMIN UR-MCNC: 1.51 MG/DL (ref 0–1.99)
MICROALBUMIN/CREAT UR: 13.8 MG/G
PLATELET # BLD AUTO: 194 THOU/UL (ref 140–440)
PMV BLD AUTO: 10.3 FL (ref 7–10)
POTASSIUM BLD-SCNC: 4.1 MMOL/L (ref 3.5–5)
RBC # BLD AUTO: 4.38 MILL/UL (ref 3.8–5.4)
SODIUM SERPL-SCNC: 139 MMOL/L (ref 136–145)
TRIGL SERPL-MCNC: 227 MG/DL
WBC: 5.4 THOU/UL (ref 4–11)

## 2021-03-11 ASSESSMENT — MIFFLIN-ST. JEOR: SCORE: 843.24

## 2021-03-13 ENCOUNTER — COMMUNICATION - HEALTHEAST (OUTPATIENT)
Dept: FAMILY MEDICINE | Facility: CLINIC | Age: 86
End: 2021-03-13

## 2021-04-22 ENCOUNTER — COMMUNICATION - HEALTHEAST (OUTPATIENT)
Dept: FAMILY MEDICINE | Facility: CLINIC | Age: 86
End: 2021-04-22

## 2021-04-22 DIAGNOSIS — K21.9 GASTROESOPHAGEAL REFLUX DISEASE WITHOUT ESOPHAGITIS: ICD-10-CM

## 2021-04-22 DIAGNOSIS — E78.5 HYPERLIPIDEMIA, UNSPECIFIED HYPERLIPIDEMIA TYPE: ICD-10-CM

## 2021-04-22 DIAGNOSIS — E11.9 DIABETES (H): ICD-10-CM

## 2021-04-26 ENCOUNTER — COMMUNICATION - HEALTHEAST (OUTPATIENT)
Dept: FAMILY MEDICINE | Facility: CLINIC | Age: 86
End: 2021-04-26

## 2021-04-26 DIAGNOSIS — E11.9 DIABETES (H): ICD-10-CM

## 2021-04-26 DIAGNOSIS — E78.5 HYPERLIPIDEMIA, UNSPECIFIED HYPERLIPIDEMIA TYPE: ICD-10-CM

## 2021-04-26 DIAGNOSIS — K21.9 GASTROESOPHAGEAL REFLUX DISEASE WITHOUT ESOPHAGITIS: ICD-10-CM

## 2021-05-01 ENCOUNTER — HEALTH MAINTENANCE LETTER (OUTPATIENT)
Age: 86
End: 2021-05-01

## 2021-05-03 ENCOUNTER — COMMUNICATION - HEALTHEAST (OUTPATIENT)
Dept: FAMILY MEDICINE | Facility: CLINIC | Age: 86
End: 2021-05-03

## 2021-05-03 ENCOUNTER — COMMUNICATION - HEALTHEAST (OUTPATIENT)
Dept: SCHEDULING | Facility: CLINIC | Age: 86
End: 2021-05-03

## 2021-05-03 DIAGNOSIS — E78.5 HYPERLIPIDEMIA, UNSPECIFIED HYPERLIPIDEMIA TYPE: ICD-10-CM

## 2021-05-03 DIAGNOSIS — I10 ESSENTIAL HYPERTENSION, BENIGN: ICD-10-CM

## 2021-05-06 ENCOUNTER — COMMUNICATION - HEALTHEAST (OUTPATIENT)
Dept: FAMILY MEDICINE | Facility: CLINIC | Age: 86
End: 2021-05-06

## 2021-05-06 DIAGNOSIS — E11.9 DIABETES (H): ICD-10-CM

## 2021-05-06 DIAGNOSIS — E78.5 HYPERLIPIDEMIA, UNSPECIFIED HYPERLIPIDEMIA TYPE: ICD-10-CM

## 2021-05-27 NOTE — PROGRESS NOTES
SUBJECTIVE: Myron Meng is a 83 y.o. Patient Declined female who presents today with her son-in-law and without an  as it is desired by the family.  Patient is a type II diabetic who is taking metformin thousand milligrams twice daily.  I last saw her on 11/20.  Her last A1c was 7.3%.  She has coronary artery disease, hypertension well controlled, and hyperlipidemia well controlled.  She has seen Giorgio and the audiologist since I have seen her.  She has had problems with infection in her jaw and has dentures.  Apparently the dentist said that there is nothing much to do as new dentures will not help.  They just periodically have to go in and have that area scraped down.  And apparently the hearing aids are not going to be helpful for her.  She has no complaints today.  The son-in-law tells me that she has been eating pretty well and has not had any stomach issues.  Patient agrees with this.  She has had a lot of problems with her stomach in the past and has been anemic due to this.  Her las hemoglobint was 9.5.  She does drink her tea with quite a bit of sugar.  She is not checking her blood sugars.  They have not scheduled an eye appointment.  She is willing to try to get me some urine for a microalbumin creatinine ratio.    OBJECTIVE: /66 (Patient Site: Right Arm, Patient Position: Sitting, Cuff Size: Adult Regular)   Pulse 75   Temp 98.3  F (36.8  C) (Oral)   Wt 107 lb 8 oz (48.8 kg)   SpO2 97%   Breastfeeding? No   BMI 20.65 kg/m    General: Well-appearing octogenarian in no acute distress  Heart: Regular rate and rhythm without murmur  Lungs: Decreased breath sounds bilaterally, likely due to effort  Abdomen: Soft, nontender in the epigastric area with palpation  Extremities: Warm, dry without edema    ASSESSMENT & PLAN:    1. Type 2 diabetes mellitus without complication, without long-term current use of insulin (H)  Glycosylated Hemoglobin A1c    Microalbumin, Random Urine   2.  Coronary atherosclerosis due to lipid rich plaque     3. Benign Essential Hypertension  Basic Metabolic Panel   4. Hyperlipidemia, unspecified hyperlipidemia type  Lipid Cascade   5. Gastroesophageal reflux disease without esophagitis  HM2(CBC w/o Differential)     The above mentioned lab work was done today.  A1c came back elevated at 8.2% unfortunately.  She is to make sure to take all doses of metformin and try to reduce her sugar intake.  If this does not do the trick by next check then I will start her on low-dose glipizide.  I will get back to them on the other results by mail and only call with grossly abnormal values.  They should return to clinic in 3 months time for recheck.    Patient Active Problem List   Diagnosis     Acute Subdural Hematoma     Helicobacter Pylori (H. Pylori) Infection     Lower Back Pain     Chronic Reflux Esophagitis     Type 2 diabetes mellitus (H)     Benign Essential Hypertension     Coronary Artery Disease     Hyperlipidemia     Cirrhosis     Cholelithiasis     GERD (gastroesophageal reflux disease)     Hiatal hernia     Anxiety     Osteoporosis       Current Outpatient Medications on File Prior to Visit   Medication Sig Dispense Refill     benzocaine (HURRICAINE) 20 % Gel Apply 1 application to teeth 4 (four) times a day as needed. 30 each 0     ibuprofen (ADVIL,MOTRIN) 600 MG tablet Take 1 tablet (600 mg total) by mouth every 6 (six) hours as needed for pain. 60 tablet 0     lisinopril (PRINIVIL,ZESTRIL) 30 MG tablet TAKE 1 TABLET BY MOUTH EVERY DAY 90 tablet 1     metFORMIN (GLUCOPHAGE) 500 MG tablet TAKE 2 TABLETS BY MOUTH TWICE A  tablet 1     metoprolol succinate (TOPROL-XL) 100 MG 24 hr tablet TAKE 1 TABLET BY MOUTH EVERY DAY 90 tablet 1     nitroglycerin (NITROSTAT) 0.4 MG SL tablet Place 1 tablet (0.4 mg total) under the tongue every 5 (five) minutes as needed for chest pain For up to 3 doses. Call 911 if persists. . 30 tablet 0     RABEprazole (ACIPHEX) 20 mg  tablet Take 1 tablet (20 mg total) by mouth daily. 90 tablet 3     simvastatin (ZOCOR) 20 MG tablet TAKE 1 TABLET (20 MG TOTAL) BY MOUTH BEDTIME. 90 tablet 1     No current facility-administered medications on file prior to visit.

## 2021-05-29 NOTE — PROGRESS NOTES
"    SUBJECTIVE: Myron Meng is a 83 y.o. Patient Declined female who presents today with her daughter for her diabetes check.  I last saw her April 9 and at that time her A1c was uncharacteristically high at 8.0%.  I believe she was not taking her medications the way she was supposed to.  She also has a coronary artery disease labile as she has had problems in the past but nothing recent.  Her blood pressure is well controlled.  Her hyperlipidemia is well controlled on her simvastatin 20.  Her last LDL was 47.  She has a history of quite serious reflux due to hiatal hernia.  She has had issues with anemia that was thought to be secondary to her stomach issues.  She also is vegetarian.  Her last hemoglobin was 9.1 and showed iron deficiency.  On exam I am noting that her abdomen looks a bit distended.  She has a history of cirrhosis but also has had tubular adenomas on colonoscopies in the past.  Her last colonoscopy was attempted 2014.  She had 3 polyps at that time.  They did not recommend a recheck time secondary to her age.  She does not want to have another colonoscopy.  At that time she had also had an EGD.  She has not had either since.  She is taking meds for GERD.  She denies symptoms.  She does admit that she has loose and frequent stooling that are sometimes green to black and coloring.  This is a little different for her.  There has not been any weight loss.    OBJECTIVE: /60   Pulse 70   Temp 98  F (36.7  C)   Ht 5' 1\" (1.549 m)   Wt 105 lb (47.6 kg)   SpO2 98%   BMI 19.84 kg/m    General: Well-appearing normal weight  female in no acute distress  Heart: Regular rate and rhythm without murmur  Lungs: Clear bilaterally  Abdomen: Soft, nontender in the epigastric area and elsewhere but stomach is distended and a bit firm  Extremities: Warm, dry and without edema    ASSESSMENT & PLAN:    1. Type 2 diabetes mellitus without complication, without long-term current use of insulin (H)  " Glycosylated Hemoglobin A1c   2. Coronary atherosclerosis due to lipid rich plaque     3. Benign Essential Hypertension     4. Hyperlipidemia, unspecified hyperlipidemia type     5. Gastroesophageal reflux disease without esophagitis  HM2(CBC w/o Differential)   6. Increased abdominal girth  CANCELED: CT Abdomen Without Oral With and Without IV Contrast   7. Iron deficiency anemia due to chronic blood loss  HM2(CBC w/o Differential)    CANCELED: CT Abdomen Without Oral With and Without IV Contrast   8. Change in stool habits  CANCELED: CT Abdomen Without Oral With and Without IV Contrast   9. Cirrhosis of liver without ascites, unspecified hepatic cirrhosis type (H)       I am a bit concerned with the increased abdominal girth I am seeing, especially in the light of her iron deficiency anemia change in stool habits.  She refuses colonoscopy.  She does have a history of cirrhosis and I think it would be a good idea to have a CT done.  I will recheck her A1c and hemogram as well today.  I will get back to them on results by mail and only call with grossly abnormal values.  I will call her with the CT results.  If all is well she can see me back in 3 to 4 months depending on her A1c result.    Patient Active Problem List   Diagnosis     Acute Subdural Hematoma     Helicobacter Pylori (H. Pylori) Infection     Lower Back Pain     Chronic Reflux Esophagitis     Type 2 diabetes mellitus (H)     Benign Essential Hypertension     Coronary Artery Disease     Hyperlipidemia     Cirrhosis     Cholelithiasis     GERD (gastroesophageal reflux disease)     Hiatal hernia     Anxiety     Osteoporosis       Current Outpatient Medications on File Prior to Visit   Medication Sig Dispense Refill     ibuprofen (ADVIL,MOTRIN) 600 MG tablet Take 1 tablet (600 mg total) by mouth every 6 (six) hours as needed for pain. 60 tablet 0     lisinopril (PRINIVIL,ZESTRIL) 30 MG tablet TAKE 1 TABLET BY MOUTH EVERY DAY 90 tablet 1     metFORMIN  (GLUCOPHAGE) 500 MG tablet TAKE 2 TABLETS BY MOUTH TWICE A  tablet 1     metoprolol succinate (TOPROL-XL) 100 MG 24 hr tablet TAKE 1 TABLET BY MOUTH EVERY DAY 90 tablet 1     nitroglycerin (NITROSTAT) 0.4 MG SL tablet Place 1 tablet (0.4 mg total) under the tongue every 5 (five) minutes as needed for chest pain For up to 3 doses. Call 911 if persists. . 30 tablet 0     RABEprazole (ACIPHEX) 20 mg tablet Take 1 tablet (20 mg total) by mouth daily. 90 tablet 3     simvastatin (ZOCOR) 20 MG tablet TAKE 1 TABLET (20 MG TOTAL) BY MOUTH BEDTIME. 90 tablet 1     benzocaine (HURRICAINE) 20 % Gel Apply 1 application to teeth 4 (four) times a day as needed. 30 each 0     No current facility-administered medications on file prior to visit.

## 2021-05-30 VITALS — WEIGHT: 102.6 LBS | BODY MASS INDEX: 19.39 KG/M2

## 2021-05-30 VITALS — WEIGHT: 103.4 LBS | BODY MASS INDEX: 19.54 KG/M2

## 2021-05-30 VITALS — BODY MASS INDEX: 19.54 KG/M2 | WEIGHT: 103.4 LBS

## 2021-05-31 VITALS — WEIGHT: 100.13 LBS | BODY MASS INDEX: 18.91 KG/M2 | HEIGHT: 61 IN

## 2021-05-31 VITALS — WEIGHT: 103.6 LBS | BODY MASS INDEX: 19.9 KG/M2

## 2021-05-31 NOTE — PROGRESS NOTES
SUBJECTIVE: Myron Meng is a 83 y.o. Patient Declined female who presents today with her daughter and son-in-law with a complaint of left eye pain that has been for the last 3 or 4 days.  Her family says she is only just talking about it with them.  She claims her vision is normal.  Her sleep is poor because of the pain.  She believes that the pain is in her eyeball and not in her head.  She denies any injury.  She is type II diabetic and I have been trying to get her to do her yearly eye exam.  We discussed she should be seen as soon as possible.  Unfortunately it is late afternoon on a Friday.  We discussed [facility] might have some Saturday hours.    OBJECTIVE: /82 (Patient Site: Left Arm, Patient Position: Sitting, Cuff Size: Adult Regular)   Pulse 72   Wt 105 lb 4.8 oz (47.8 kg)   SpO2 98%   Breastfeeding? No   BMI 19.90 kg/m    General: Healthy-appearing normal weight [place] octogenarian  HEENT: Eyes bilaterally are not erythematous  Heart: Regular rate and rhythm without murmur  Lungs: Clear bilaterally    ASSESSMENT & PLAN:    1. Left eye pain  Ambulatory referral to Ophthalmology   2. Type 2 diabetes mellitus without complication, without long-term current use of insulin (H)       I am referring her to [facility].  There is concern for glaucoma or some other type of diabetes related injury to the eye.  The son-in-law was given a card for the clinic and he will call immediately to get in as soon as possible.  They will follow-up at the usual diabetes interval or sooner on an as-needed basis.    Patient Active Problem List   Diagnosis     Acute Subdural Hematoma     Helicobacter Pylori (H. Pylori) Infection     Lower Back Pain     Chronic Reflux Esophagitis     Type 2 diabetes mellitus (H)     Benign Essential Hypertension     Coronary Artery Disease     Hyperlipidemia     Cirrhosis     Cholelithiasis     GERD (gastroesophageal reflux disease)     Hiatal hernia     Anxiety     Osteoporosis        Current Outpatient Medications on File Prior to Visit   Medication Sig Dispense Refill     benzocaine (HURRICAINE) 20 % Gel Apply 1 application to teeth 4 (four) times a day as needed. 30 each 0     ibuprofen (ADVIL,MOTRIN) 600 MG tablet Take 1 tablet (600 mg total) by mouth every 6 (six) hours as needed for pain. 60 tablet 0     lisinopril (PRINIVIL,ZESTRIL) 30 MG tablet TAKE 1 TABLET BY MOUTH EVERY DAY 90 tablet 1     metFORMIN (GLUCOPHAGE) 500 MG tablet TAKE 2 TABLETS BY MOUTH TWICE A  tablet 1     metoprolol succinate (TOPROL-XL) 100 MG 24 hr tablet TAKE 1 TABLET BY MOUTH EVERY DAY 90 tablet 1     nitroglycerin (NITROSTAT) 0.4 MG SL tablet Place 1 tablet (0.4 mg total) under the tongue every 5 (five) minutes as needed for chest pain For up to 3 doses. Call 911 if persists. . 30 tablet 0     polyethylene glycol (GLYCOLAX) 17 gram/dose powder Take 8.5 g by mouth daily. (half dose) 510 g 1     RABEprazole (ACIPHEX) 20 mg tablet Take 1 tablet (20 mg total) by mouth daily. 90 tablet 3     simvastatin (ZOCOR) 20 MG tablet TAKE 1 TABLET (20 MG TOTAL) BY MOUTH BEDTIME. 90 tablet 1     No current facility-administered medications on file prior to visit.

## 2021-06-01 VITALS — WEIGHT: 107.38 LBS | BODY MASS INDEX: 20.27 KG/M2 | HEIGHT: 61 IN

## 2021-06-01 VITALS — BODY MASS INDEX: 20.25 KG/M2 | WEIGHT: 105.4 LBS

## 2021-06-02 ENCOUNTER — RECORDS - HEALTHEAST (OUTPATIENT)
Dept: ADMINISTRATIVE | Facility: CLINIC | Age: 86
End: 2021-06-02

## 2021-06-02 VITALS — HEIGHT: 61 IN | BODY MASS INDEX: 20.01 KG/M2 | WEIGHT: 106 LBS

## 2021-06-02 VITALS — BODY MASS INDEX: 20.65 KG/M2 | WEIGHT: 107.5 LBS

## 2021-06-02 VITALS — WEIGHT: 107.4 LBS | BODY MASS INDEX: 20.63 KG/M2

## 2021-06-02 NOTE — PROGRESS NOTES
SUBJECTIVE: Myron Meng is a 83 y.o. Patient Declined female who presents today with her son-in-law for her diabetes check.  I last saw her in mid June of this year.  At that time her A1c was at 7.5% and her LDL was at 47.  Her blood pressure today is well controlled.  She has a history of coronary artery disease which was remote and she has not had problems anytime recently.  She does have osteoporosis and has some relatively severe reflux for which she needs to take a proton pump inhibitor.  She had been taking 1 of the PPIs available in [place] which they purchased and used with my consent.  She has had EGDs in the past.  She has had some anemia due to blood loss somewhere in the gut.  She has had previous sigmoid hemicolectomy.  Currently she has had no problems with appetite and is able to take care of herself and live relatively independently although she is living in her son-in-law's home.  She has already had her flu shot.  She needs her foot exam today.    OBJECTIVE: /62 (Patient Site: Left Arm, Patient Position: Sitting, Cuff Size: Adult Regular)   Pulse 67   Wt 106 lb 9.6 oz (48.4 kg)   SpO2 98%   BMI 20.14 kg/m    General: Relatively healthy appearing normal weight octogenarian in no acute distress  Heart: Regular rate and rhythm without murmur  Lungs: Clear bilaterally  Abdomen: Soft, nontender in the epigastric area or elsewhere  Extremities: Warm, dry and without edema  Foot exam shows mild toenail issues, mildly dry skin, no deformity and no skin lesions and no neuropathy on filament testing    ASSESSMENT & PLAN:     1. Type 2 diabetes mellitus without complication, without long-term current use of insulin (H)  Well-controlled for age  - Glycosylated Hemoglobin A1c    2. Coronary atherosclerosis due to lipid rich plaque  Stable and not recently problematic    3. Benign Essential Hypertension  Well-controlled on current regimen  - Basic Metabolic Panel    4. Hyperlipidemia, unspecified  hyperlipidemia type  Well-controlled  - Lipid Cascade    5. Age-related osteoporosis without current pathological fracture  We will monitor her vitamin D to make sure that is in order, prefers not to do a DEXA scan which was last done in 2016  - Vitamin D, Total (25-Hydroxy)    6. Gastroesophageal reflux disease without esophagitis  This appears controlled and since they can not currently get the PPI from [facility] they ask for a refill of her pantoprazole.  - pantoprazole (PROTONIX) 20 MG tablet; Take 2 tablets (40 mg total) by mouth 2 (two) times a day.  Dispense: 180 tablet; Refill: 1    Patient is well controlled overall and because of her age and because she is somewhat frail, she can see me back in early spring time when the weather is better.    Patient Active Problem List   Diagnosis     Acute Subdural Hematoma     Helicobacter Pylori (H. Pylori) Infection     Lower Back Pain     Chronic Reflux Esophagitis     Type 2 diabetes mellitus (H)     Benign Essential Hypertension     Coronary Artery Disease     Hyperlipidemia     Cirrhosis     Cholelithiasis     GERD (gastroesophageal reflux disease)     Hiatal hernia     Anxiety     Osteoporosis     History of malignant neoplasm of large intestine     Portal hypertension (H)       Current Outpatient Medications on File Prior to Visit   Medication Sig Dispense Refill     benzocaine (HURRICAINE) 20 % Gel Apply 1 application to teeth 4 (four) times a day as needed. 30 each 0     lisinopril (PRINIVIL,ZESTRIL) 30 MG tablet TAKE 1 TABLET BY MOUTH EVERY DAY 90 tablet 1     meclizine (ANTIVERT) 25 mg tablet Take 1 tablet (25 mg total) by mouth 3 (three) times a day as needed for dizziness. 30 tablet 0     metFORMIN (GLUCOPHAGE) 500 MG tablet TAKE 2 TABLETS BY MOUTH TWICE A  tablet 1     metoprolol succinate (TOPROL-XL) 100 MG 24 hr tablet TAKE 1 TABLET BY MOUTH EVERY DAY 90 tablet 1     nitroglycerin (NITROSTAT) 0.4 MG SL tablet Place 1 tablet (0.4 mg total) under the  tongue every 5 (five) minutes as needed for chest pain For up to 3 doses. Call 911 if persists. . 30 tablet 0     simvastatin (ZOCOR) 20 MG tablet TAKE 1 TABLET (20 MG TOTAL) BY MOUTH BEDTIME. 90 tablet 1     No current facility-administered medications on file prior to visit.

## 2021-06-03 VITALS — WEIGHT: 105.3 LBS | BODY MASS INDEX: 19.9 KG/M2

## 2021-06-03 VITALS — HEIGHT: 61 IN | WEIGHT: 105 LBS | BODY MASS INDEX: 19.83 KG/M2

## 2021-06-03 VITALS
OXYGEN SATURATION: 98 % | SYSTOLIC BLOOD PRESSURE: 126 MMHG | DIASTOLIC BLOOD PRESSURE: 62 MMHG | HEART RATE: 67 BPM | BODY MASS INDEX: 20.14 KG/M2 | WEIGHT: 106.6 LBS

## 2021-06-03 NOTE — TELEPHONE ENCOUNTER
Medication Question or Clarification  Who is calling: Pharmacy: [facility]  What medication are you calling about? (include dose and sig)   pantoprazole (PROTONIX) 20 MG tablet 180 tablet 1 11/1/2019  --   Sig - Route: Take 2 tablets (40 mg total) by mouth 2 (two) times a day. - Oral       Who prescribed the medication?: Neelima Patel MD   What is your question/concern?: Insurance only permits one tablet per day, please send alternative  Pharmacy: [facility] [id number]  Okay to leave a detailed message?: Yes  Site CMT - Please call the pharmacy to obtain any additional needed information.

## 2021-06-03 NOTE — TELEPHONE ENCOUNTER
Medication Question or Clarification  Who is calling: Pharmacy: [facility] #[id number]  What medication are you calling about? (include dose and sig)   pantoprazole (PROTONIX) 20 MG tablet 180 tablet 1 11/1/2019     Sig - Route: Take 2 tablets (40 mg total) by mouth 2 (two) times a day. - Oral    Sent to pharmacy as: pantoprazole 20 mg tablet,delayed release (PROTONIX)    E-Prescribing Status: Receipt confirmed by pharmacy (11/1/2019  2:48 PM CDT        Who prescribed the medication?: Neelima Patel MD    What is your question/concern?: Insurance will only cover max of 1 tablet daily.  Alternative requested  Pharmacy: [facility] # [id number]  Okay to leave a detailed message?: Yes  Site CMT - Please call the pharmacy to obtain any additional needed information.

## 2021-06-04 VITALS
HEART RATE: 63 BPM | DIASTOLIC BLOOD PRESSURE: 72 MMHG | BODY MASS INDEX: 19.63 KG/M2 | WEIGHT: 104 LBS | SYSTOLIC BLOOD PRESSURE: 122 MMHG | HEIGHT: 61 IN | OXYGEN SATURATION: 99 %

## 2021-06-04 VITALS
HEART RATE: 88 BPM | WEIGHT: 105 LBS | DIASTOLIC BLOOD PRESSURE: 68 MMHG | BODY MASS INDEX: 19.84 KG/M2 | SYSTOLIC BLOOD PRESSURE: 120 MMHG

## 2021-06-05 VITALS
BODY MASS INDEX: 20.62 KG/M2 | SYSTOLIC BLOOD PRESSURE: 140 MMHG | HEIGHT: 60 IN | HEART RATE: 78 BPM | OXYGEN SATURATION: 96 % | DIASTOLIC BLOOD PRESSURE: 80 MMHG | WEIGHT: 105 LBS

## 2021-06-05 VITALS
SYSTOLIC BLOOD PRESSURE: 118 MMHG | OXYGEN SATURATION: 96 % | HEART RATE: 80 BPM | DIASTOLIC BLOOD PRESSURE: 62 MMHG | HEIGHT: 60 IN | WEIGHT: 104 LBS | BODY MASS INDEX: 20.42 KG/M2

## 2021-06-06 NOTE — PROGRESS NOTES
SUBJECTIVE: Myron Meng is a 84 y.o. Patient Declined female who presents today with her son and .  She has type 2 diabetes and coronary artery disease.  She has been very stable for the last few years.  Her last A1c was 7.5% which I think is adequate for her age.  Her blood pressure is well controlled  As is her cholesterol with an LDL of 36.  She had longstanding reflux symptoms and had had problems with swallowing but she has been taking her PPI regularly and this has helped immensely after she was treated with dilatation via EGD.  She has osteopenia and her vitamin D was excellent at last check which was when I saw her last on 11/1/2019.  She is currently dealing with a cold and cough but she claims she is slowly improving.  She is sleeping okay with cough syrup with codeine which she got last time she was sick.  And she is using over-the-counter cough syrup during the day.  She otherwise is thriving at home.  She is still making meals for the family and doing chores at home.  She does not like to not be busy.  She is still very mobile and with it.    OBJECTIVE: /68 (Patient Site: Right Arm, Patient Position: Sitting, Cuff Size: Adult Regular)   Pulse 88   Wt 105 lb (47.6 kg)   Breastfeeding No   BMI 19.84 kg/m    General: Healthy and rather spry appearing octogenarian in no acute distress  Heart: Regular rate and rhythm without murmur  Lungs: Clear bilaterally  Abdomen: Soft, nontender  Extremities: Warm, dry and without edema      ASSESSMENT & PLAN:     1. Type 2 diabetes mellitus without complication, without long-term current use of insulin (H)  Well-controlled for her age, will monitor with labs.  - Glycosylated Hemoglobin A1c  - Microalbumin, Random Urine    2. Coronary atherosclerosis due to lipid rich plaque  Stable    3. Benign Essential Hypertension  Well-controlled, will monitor her with lab.    4. Viral upper respiratory tract infection  Improving and being treated with  over-the-counter medication for symptom control.    I will get back to them on lab results by mail and only call with grossly abnormal values.  I will refill her meds as needed.  She can see me back in 4 months time.    Patient Active Problem List   Diagnosis     Acute Subdural Hematoma     Helicobacter Pylori (H. Pylori) Infection     Lower Back Pain     Chronic Reflux Esophagitis     Type 2 diabetes mellitus (H)     Benign Essential Hypertension     Coronary Artery Disease     Hyperlipidemia     Cirrhosis     Cholelithiasis     GERD (gastroesophageal reflux disease)     Hiatal hernia     Anxiety     Osteoporosis     History of malignant neoplasm of large intestine     Portal hypertension (H)       Current Outpatient Medications on File Prior to Visit   Medication Sig Dispense Refill     lisinopril (PRINIVIL,ZESTRIL) 30 MG tablet TAKE 1 TABLET BY MOUTH EVERY DAY 90 tablet 2     metFORMIN (GLUCOPHAGE) 500 MG tablet TAKE 2 TABLETS BY MOUTH TWICE A  tablet 0     metoprolol succinate (TOPROL-XL) 100 MG 24 hr tablet TAKE 1 TABLET BY MOUTH EVERY DAY 90 tablet 2     nitroglycerin (NITROSTAT) 0.4 MG SL tablet Place 1 tablet (0.4 mg total) under the tongue every 5 (five) minutes as needed for chest pain For up to 3 doses. Call 911 if persists. . 30 tablet 0     pantoprazole (PROTONIX) 40 MG tablet Take 1 tablet (40 mg total) by mouth daily. 90 tablet 1     simvastatin (ZOCOR) 20 MG tablet TAKE 1 TABLET (20 MG TOTAL) BY MOUTH BEDTIME. 90 tablet 0     No current facility-administered medications on file prior to visit.

## 2021-06-06 NOTE — TELEPHONE ENCOUNTER
Refill Approved    Rx renewed per Medication Renewal Policy. Medication was last renewed on 3/17/19.    Kinza Marcus, [facility] Triage/Med Refill 2/15/2020     Requested Prescriptions   Pending Prescriptions Disp Refills     simvastatin (ZOCOR) 20 MG tablet [Pharmacy Med Name: SIMVASTATIN 20 MG TABLET] 90 tablet 1     Sig: TAKE 1 TABLET (20 MG TOTAL) BY MOUTH BEDTIME.       Statins Refill Protocol (Hmg CoA Reductase Inhibitors) Passed - 2/15/2020  2:28 PM        Passed - PCP or prescribing provider visit in past 12 months      Last office visit with prescriber/PCP: 11/1/2019 Neelima Patel MD OR same dept: 11/1/2019 Neelima Patel MD OR same specialty: 11/1/2019 Neelima Patel MD  Last physical: 9/2/2016 Last MTM visit: Visit date not found   Next visit within 3 mo: Visit date not found  Next physical within 3 mo: Visit date not found  Prescriber OR PCP: Sintia) DINA Patel MD  Last diagnosis associated with med order: 1. Hyperlipidemia, unspecified hyperlipidemia type  - simvastatin (ZOCOR) 20 MG tablet [Pharmacy Med Name: SIMVASTATIN 20 MG TABLET]; TAKE 1 TABLET (20 MG TOTAL) BY MOUTH BEDTIME.  Dispense: 90 tablet; Refill: 1    2. Diabetes (H)  - metFORMIN (GLUCOPHAGE) 500 MG tablet [Pharmacy Med Name: METFORMIN  MG TABLET]; TAKE 2 TABLETS BY MOUTH TWICE A DAY  Dispense: 360 tablet; Refill: 1    3. Benign Essential Hypertension  - lisinopril (PRINIVIL,ZESTRIL) 30 MG tablet [Pharmacy Med Name: LISINOPRIL 30 MG TABLET]; TAKE 1 TABLET BY MOUTH EVERY DAY  Dispense: 90 tablet; Refill: 1  - metoprolol succinate (TOPROL-XL) 100 MG 24 hr tablet [Pharmacy Med Name: METOPROLOL SUCC  MG TAB]; TAKE 1 TABLET BY MOUTH EVERY DAY  Dispense: 90 tablet; Refill: 1    If protocol passes may refill for 12 months if within 3 months of last provider visit (or a total of 15 months).             metFORMIN (GLUCOPHAGE) 500 MG tablet [Pharmacy Med Name: METFORMIN  MG TABLET] 360  tablet 1     Sig: TAKE 2 TABLETS BY MOUTH TWICE A DAY       Metformin Refill Protocol Passed - 2/15/2020  2:28 PM        Passed - Blood pressure in last 12 months     BP Readings from Last 1 Encounters:   11/01/19 126/62             Passed - LFT or AST or ALT in last 12 months     Albumin   Date Value Ref Range Status   08/07/2019 3.5 3.5 - 5.0 g/dL Final     Bilirubin, Total   Date Value Ref Range Status   08/07/2019 0.4 0.0 - 1.0 mg/dL Final     Bilirubin, Direct   Date Value Ref Range Status   03/27/2015 0.2 <=0.5 mg/dL Final     Alkaline Phosphatase   Date Value Ref Range Status   08/07/2019 55 45 - 120 U/L Final     AST   Date Value Ref Range Status   08/07/2019 38 0 - 40 U/L Final     ALT   Date Value Ref Range Status   08/07/2019 23 0 - 45 U/L Final     Protein, Total   Date Value Ref Range Status   08/07/2019 8.2 (H) 6.0 - 8.0 g/dL Final                Passed - GFR or Serum Creatinine in last 6 months     GFR MDRD Non Af Amer   Date Value Ref Range Status   11/01/2019 57 (L) >60 mL/min/1.73m2 Final     GFR MDRD Af Amer   Date Value Ref Range Status   11/01/2019 >60 >60 mL/min/1.73m2 Final             Passed - Visit with PCP or prescribing provider visit in last 6 months or next 3 months     Last office visit with prescriber/PCP: 11/1/2019 OR same dept: 11/1/2019 Neelima Patel MD OR same specialty: 11/1/2019 Neelima Patel MD Last physical: Visit date not found Last MTM visit: Visit date not found         Next appt within 3 mo: Visit date not found  Next physical within 3 mo: Visit date not found  Prescriber OR PCP: Neelima Patel MD ([facility])  Last diagnosis associated with med order: 1. Hyperlipidemia, unspecified hyperlipidemia type  - simvastatin (ZOCOR) 20 MG tablet [Pharmacy Med Name: SIMVASTATIN 20 MG TABLET]; TAKE 1 TABLET (20 MG TOTAL) BY MOUTH BEDTIME.  Dispense: 90 tablet; Refill: 1    2. Diabetes (H)  - metFORMIN (GLUCOPHAGE) 500 MG tablet [Pharmacy Med Name: METFORMIN   MG TABLET]; TAKE 2 TABLETS BY MOUTH TWICE A DAY  Dispense: 360 tablet; Refill: 1    3. Benign Essential Hypertension  - lisinopril (PRINIVIL,ZESTRIL) 30 MG tablet [Pharmacy Med Name: LISINOPRIL 30 MG TABLET]; TAKE 1 TABLET BY MOUTH EVERY DAY  Dispense: 90 tablet; Refill: 1  - metoprolol succinate (TOPROL-XL) 100 MG 24 hr tablet [Pharmacy Med Name: METOPROLOL SUCC  MG TAB]; TAKE 1 TABLET BY MOUTH EVERY DAY  Dispense: 90 tablet; Refill: 1     If protocol passes may refill for 12 months if within 3 months of last provider visit (or a total of 15 months).           Passed - A1C in last 6 months     Hemoglobin A1c   Date Value Ref Range Status   11/01/2019 7.7 (H) 3.5 - 6.0 % Final               Passed - Microalbumin in last year      Microalbumin, Random Urine   Date Value Ref Range Status   04/09/2019 1.02 0.00 - 1.99 mg/dL Final                  lisinopril (PRINIVIL,ZESTRIL) 30 MG tablet [Pharmacy Med Name: LISINOPRIL 30 MG TABLET] 90 tablet 1     Sig: TAKE 1 TABLET BY MOUTH EVERY DAY       Ace Inhibitors Refill Protocol Passed - 2/15/2020  2:28 PM        Passed - PCP or prescribing provider visit in past 12 months       Last office visit with prescriber/PCP: 11/1/2019 Neelima Patel MD OR same dept: 11/1/2019 Neelima Patel MD OR same specialty: 11/1/2019 Neelima Patel MD  Last physical: 9/2/2016 Last MTM visit: Visit date not found   Next visit within 3 mo: Visit date not found  Next physical within 3 mo: Visit date not found  Prescriber OR PCP: Neelima Patel MD ([place])  Last diagnosis associated with med order: 1. Hyperlipidemia, unspecified hyperlipidemia type  - simvastatin (ZOCOR) 20 MG tablet [Pharmacy Med Name: SIMVASTATIN 20 MG TABLET]; TAKE 1 TABLET (20 MG TOTAL) BY MOUTH BEDTIME.  Dispense: 90 tablet; Refill: 1    2. Diabetes (H)  - metFORMIN (GLUCOPHAGE) 500 MG tablet [Pharmacy Med Name: METFORMIN  MG TABLET]; TAKE 2 TABLETS BY MOUTH TWICE A DAY  Dispense: 360  tablet; Refill: 1    3. Benign Essential Hypertension  - lisinopril (PRINIVIL,ZESTRIL) 30 MG tablet [Pharmacy Med Name: LISINOPRIL 30 MG TABLET]; TAKE 1 TABLET BY MOUTH EVERY DAY  Dispense: 90 tablet; Refill: 1  - metoprolol succinate (TOPROL-XL) 100 MG 24 hr tablet [Pharmacy Med Name: METOPROLOL SUCC  MG TAB]; TAKE 1 TABLET BY MOUTH EVERY DAY  Dispense: 90 tablet; Refill: 1    If protocol passes may refill for 12 months if within 3 months of last provider visit (or a total of 15 months).             Passed - Serum Potassium in past 12 months     Lab Results   Component Value Date    Potassium 4.3 11/01/2019             Passed - Blood pressure filed in past 12 months     BP Readings from Last 1 Encounters:   11/01/19 126/62             Passed - Serum Creatinine in past 12 months     Creatinine   Date Value Ref Range Status   11/01/2019 0.94 0.60 - 1.10 mg/dL Final             metoprolol succinate (TOPROL-XL) 100 MG 24 hr tablet [Pharmacy Med Name: METOPROLOL SUCC  MG TAB] 90 tablet 1     Sig: TAKE 1 TABLET BY MOUTH EVERY DAY       Beta-Blockers Refill Protocol Passed - 2/15/2020  2:28 PM        Passed - PCP or prescribing provider visit in past 12 months or next 3 months     Last office visit with prescriber/PCP: 11/1/2019 Neelima Patel MD OR same dept: 11/1/2019 Neelima Patel MD OR same specialty: 11/1/2019 Neelima Patel MD  Last physical: 9/2/2016 Last MTM visit: Visit date not found   Next visit within 3 mo: Visit date not found  Next physical within 3 mo: Visit date not found  Prescriber OR PCP: Neelima Patel MD ([place])  Last diagnosis associated with med order: 1. Hyperlipidemia, unspecified hyperlipidemia type  - simvastatin (ZOCOR) 20 MG tablet [Pharmacy Med Name: SIMVASTATIN 20 MG TABLET]; TAKE 1 TABLET (20 MG TOTAL) BY MOUTH BEDTIME.  Dispense: 90 tablet; Refill: 1    2. Diabetes (H)  - metFORMIN (GLUCOPHAGE) 500 MG tablet [Pharmacy Med Name: METFORMIN   MG TABLET]; TAKE 2 TABLETS BY MOUTH TWICE A DAY  Dispense: 360 tablet; Refill: 1    3. Benign Essential Hypertension  - lisinopril (PRINIVIL,ZESTRIL) 30 MG tablet [Pharmacy Med Name: LISINOPRIL 30 MG TABLET]; TAKE 1 TABLET BY MOUTH EVERY DAY  Dispense: 90 tablet; Refill: 1  - metoprolol succinate (TOPROL-XL) 100 MG 24 hr tablet [Pharmacy Med Name: METOPROLOL SUCC  MG TAB]; TAKE 1 TABLET BY MOUTH EVERY DAY  Dispense: 90 tablet; Refill: 1    If protocol passes may refill for 12 months if within 3 months of last provider visit (or a total of 15 months).             Passed - Blood pressure filed in past 12 months     BP Readings from Last 1 Encounters:   11/01/19 126/62

## 2021-06-08 NOTE — TELEPHONE ENCOUNTER
Refills Approved x2     Rx renewed per Medication Renewal Policy. Medications were both last renewed on 2/15/2020.  Last office visit: 2/28/2020 with PCP Dr BARI Patel/     [facility]     Requested Prescriptions   Pending Prescriptions Disp Refills     simvastatin (ZOCOR) 20 MG tablet [Pharmacy Med Name: SIMVASTATIN 20 MG TABLET] 90 tablet 0     Sig: TAKE 1 TABLET BY MOUTH AT BEDTIME       Statins Refill Protocol (Hmg CoA Reductase Inhibitors) Passed - 5/14/2020 12:46 AM        Passed - PCP or prescribing provider visit in past 12 months      Last office visit with prescriber/PCP: 2/28/2020 Neelima Patel MD OR same dept: 2/28/2020 Neelima Patel MD OR same specialty: 2/28/2020 Neelima Patel MD  Last physical: 9/2/2016 Last MTM visit: Visit date not found   Next visit within 3 mo: Visit date not found  Next physical within 3 mo: Visit date not found  Prescriber OR PCP: Sintia) DINA Patel MD  Last diagnosis associated with med order: 1. Hyperlipidemia, unspecified hyperlipidemia type  - simvastatin (ZOCOR) 20 MG tablet [Pharmacy Med Name: SIMVASTATIN 20 MG TABLET]; TAKE 1 TABLET BY MOUTH AT BEDTIME  Dispense: 90 tablet; Refill: 0    2. Diabetes (H)  - metFORMIN (GLUCOPHAGE) 500 MG tablet [Pharmacy Med Name: METFORMIN  MG TABLET]; TAKE 2 TABLETS BY MOUTH TWICE A DAY  Dispense: 360 tablet; Refill: 0    If protocol passes may refill for 12 months if within 3 months of last provider visit (or a total of 15 months).                metFORMIN (GLUCOPHAGE) 500 MG tablet [Pharmacy Med Name: METFORMIN  MG TABLET] 360 tablet 0     Sig: TAKE 2 TABLETS BY MOUTH TWICE A DAY       Metformin Refill Protocol Passed - 5/14/2020 12:46 AM        Passed - Blood pressure in last 12 months     BP Readings from Last 1 Encounters:   02/28/20 120/68             Passed - LFT or AST or ALT in last 12 months     Albumin   Date Value Ref Range  Status   08/07/2019 3.5 3.5 - 5.0 g/dL Final     Bilirubin, Total   Date Value Ref Range Status   08/07/2019 0.4 0.0 - 1.0 mg/dL Final     Bilirubin, Direct   Date Value Ref Range Status   03/27/2015 0.2 <=0.5 mg/dL Final     Alkaline Phosphatase   Date Value Ref Range Status   08/07/2019 55 45 - 120 U/L Final     AST   Date Value Ref Range Status   08/07/2019 38 0 - 40 U/L Final     ALT   Date Value Ref Range Status   08/07/2019 23 0 - 45 U/L Final     Protein, Total   Date Value Ref Range Status   08/07/2019 8.2 (H) 6.0 - 8.0 g/dL Final                Passed - GFR or Serum Creatinine in last 6 months     GFR MDRD Non Af Amer   Date Value Ref Range Status   11/01/2019 57 (L) >60 mL/min/1.73m2 Final     GFR MDRD Af Amer   Date Value Ref Range Status   11/01/2019 >60 >60 mL/min/1.73m2 Final             Passed - Visit with PCP or prescribing provider visit in last 6 months or next 3 months     Last office visit with prescriber/PCP: 2/28/2020 OR same dept: 2/28/2020 Neelima Patel MD OR same specialty: 2/28/2020 Neelima Patel MD Last physical: Visit date not found Last MTM visit: Visit date not found         Next appt within 3 mo: Visit date not found  Next physical within 3 mo: Visit date not found  Prescriber OR PCP: Sintia) DINA Patel MD  Last diagnosis associated with med order: 1. Hyperlipidemia, unspecified hyperlipidemia type  - simvastatin (ZOCOR) 20 MG tablet [Pharmacy Med Name: SIMVASTATIN 20 MG TABLET]; TAKE 1 TABLET BY MOUTH AT BEDTIME  Dispense: 90 tablet; Refill: 0    2. Diabetes (H)  - metFORMIN (GLUCOPHAGE) 500 MG tablet [Pharmacy Med Name: METFORMIN  MG TABLET]; TAKE 2 TABLETS BY MOUTH TWICE A DAY  Dispense: 360 tablet; Refill: 0     If protocol passes may refill for 12 months if within 3 months of last provider visit (or a total of 15 months).           Passed - A1C in last 6 months     Hemoglobin A1c   Date Value Ref Range Status   02/28/2020 7.5 (H) 3.5 - 6.0 %  Final               Passed - Microalbumin in last year      Microalbumin, Random Urine   Date Value Ref Range Status   02/28/2020 1.77 0.00 - 1.99 mg/dL Final

## 2021-06-09 NOTE — PROGRESS NOTES
SUBJECTIVE: Myron Meng is a 81 y.o. [place]  female who presents today with her son-in-law for her diabetes check.  She has an elevated blood pressure of 160/90 and I ask why this is.  They tell me she ran out of her medication.  We discussed that she should ask for refills to tide her over until her appointment.  She was last seen in September 2016 at which time she had an A1c of 6.4% and an LDL of 43.  She is due to have her urine checked.  We discussed having a yearly eye exam and they would like a referral for that.  She needs her foot exam today.  She has reflux and epigastric tenderness and is using Protonix on a regular basis.  She does not tolerate baby aspirin because of her symptoms.  She needs refills of her medications.      OBJECTIVE: /90  Pulse 72  Wt 103 lb 6.4 oz (46.9 kg)  BMI 19.54 kg/m2  General: Thin and somewhat frail elderly female in no acute distress, poor English speaker but understands most of what is said  Heart: Regular rate and rhythm without murmur  Lungs: Clear bilaterally  Abdomen: Soft, nontender  Extremities: Warm, dry and without edema  Foot exam: No skin lesions, normal on filament testing    ASSESSMENT & PLAN:    1. Type 2 diabetes mellitus  Microalbumin, Random Urine    Glycosylated Hemoglobin A1c    Ambulatory referral to Ophthalmology    metFORMIN (GLUCOPHAGE) 500 MG tablet   2. Benign Essential Hypertension  Basic Metabolic Panel    lisinopril (PRINIVIL,ZESTRIL) 30 MG tablet    metoprolol succinate (TOPROL-XL) 100 MG 24 hr tablet   3. Hyperlipidemia  simvastatin (ZOCOR) 20 MG tablet   4. GERD (gastroesophageal reflux disease)  pantoprazole (PROTONIX) 40 MG tablet     I refilled the above-mentioned medications.  I referred them to [facility].  I checked the above-mentioned labs and I will get back to them by mail and only call with grossly abnormal values.  They are to follow up in 4 months time for recheck.    Patient Active Problem List   Diagnosis      Acute Subdural Hematoma     Helicobacter Pylori (H. Pylori) Infection     Lower Back Pain     Fever (Symptom)     Chronic Reflux Esophagitis     Type 2 diabetes mellitus     Benign Essential Hypertension     Coronary Artery Disease     Carcinoma Of The Large Intestine     Hyperlipidemia     Esophageal Varices     Cirrhosis     Cholelithiasis     GERD (gastroesophageal reflux disease)     Hiatal hernia     Anxiety     Osteoporosis       Current Outpatient Prescriptions on File Prior to Visit   Medication Sig Dispense Refill     nitroglycerin (NITROSTAT) 0.4 MG SL tablet Place 0.4 mg under the tongue every 5 (five) minutes as needed for chest pain. For up to 3 doses. Call 911 if persists.       ranitidine (ZANTAC) 150 MG tablet Take 1 tablet (150 mg total) by mouth bedtime. 90 tablet 1     benzonatate (TESSALON PERLES) 100 MG capsule Take 1 capsule (100 mg total) by mouth every 6 (six) hours as needed for cough. 30 capsule 0     No current facility-administered medications on file prior to visit.

## 2021-06-09 NOTE — PROGRESS NOTES
"  SUBJECTIVE: Myron Meng is a 84 y.o. Patient Declined female who presents today with her son-in-law for her diabetes check.  She has a history of type 2 diabetes and coronary artery disease.  She has not had any problems with  her heart for quite some time.  Her last LDL was 36.  If she is very active she can get some shortness of breath.  She has hypertension which is well controlled.  They tell me that she has been eating quite a few sweets and they are fearful her A1c will be elevated today.She has a history of pretty severe reflux disease and needs to be on a PPI all the time.  She used to have loose stools frequently but now is only having them intermittently.    She is still quite active and that she is baking and cooking all the time.  Apparently she is a wonderful .    OBJECTIVE: /72   Pulse 63   Ht 5' 1\" (1.549 m)   Wt 104 lb (47.2 kg)   SpO2 99%   Breastfeeding No   BMI 19.65 kg/m    General: Well appearing normal weight  female in no acute distress  Heart: Regular rate and rhythm without murmur  Lungs: Clear bilaterally  Abdomen: Soft, somewhat distended, denies tenderness but I am unsure of this  Extremities: Warm, dry and without edema    ASSESSMENT & PLAN:     1. Type 2 diabetes mellitus without complication, without long-term current use of insulin (H)  The son-in-law suspects that this will be higher this check.  Hopefully not as high as 8%.    - Glycosylated Hemoglobin A1c    2. Coronary atherosclerosis due to lipid rich plaque  This has been very stable.  She does complain of shortness of breath on exertion.    3. Benign Essential Hypertension  Well-controlled.  Will monitor lab.  - Comprehensive Metabolic Panel    4. Hyperlipidemia, unspecified hyperlipidemia type  Historically well controlled.  Will monitor lab.  - Lipid [place] FASTING    5. Gastroesophageal reflux disease without esophagitis  Needs to be on continuous PPI treatment.  Denies reflux symptoms.    6. SOB " (shortness of breath) on exertion  - BNP(B-type Natriuretic Peptide)    She will have the above-mentioned lab work drawn today and I will get back to them on those results by mail and only call with grossly abnormal values.  We discussed it might be a good idea to have an annual wellness visit at her next check which would be in 4 months time roughly.    Patient Active Problem List   Diagnosis     Acute Subdural Hematoma     Helicobacter Pylori (H. Pylori) Infection     Lower Back Pain     Chronic Reflux Esophagitis     Type 2 diabetes mellitus (H)     Benign Essential Hypertension     Coronary Artery Disease     Hyperlipidemia     Cirrhosis     Cholelithiasis     GERD (gastroesophageal reflux disease)     Hiatal hernia     Anxiety     Osteoporosis     History of malignant neoplasm of large intestine     Portal hypertension (H)       Current Outpatient Medications on File Prior to Visit   Medication Sig Dispense Refill     lisinopril (PRINIVIL,ZESTRIL) 30 MG tablet TAKE 1 TABLET BY MOUTH EVERY DAY 90 tablet 2     metFORMIN (GLUCOPHAGE) 500 MG tablet TAKE 2 TABLETS BY MOUTH TWICE A  tablet 3     metoprolol succinate (TOPROL-XL) 100 MG 24 hr tablet TAKE 1 TABLET BY MOUTH EVERY DAY 90 tablet 2     pantoprazole (PROTONIX) 40 MG tablet TAKE 1 TABLET BY MOUTH EVERY DAY 90 tablet 3     simvastatin (ZOCOR) 20 MG tablet TAKE 1 TABLET BY MOUTH AT BEDTIME 90 tablet 3     nitroglycerin (NITROSTAT) 0.4 MG SL tablet Place 1 tablet (0.4 mg total) under the tongue every 5 (five) minutes as needed for chest pain For up to 3 doses. Call 911 if persists. . 30 tablet 0     No current facility-administered medications on file prior to visit.

## 2021-06-10 NOTE — PROGRESS NOTES
ASSESSMENT/PLAN:       1. Pain in thumb joint with movement, left  -X-ray appears really very normal to me and does show some mild arthritis to the radiologist.  I ordered labs as listed below to look for any signs of obvious inflammation or gout.  If this is all unremarkable we discussed taking 200-400 mg of ibuprofen a few times a day for now as well as trying some ice.  I offered a splint and they would like to hold off on that for now as well.  Follow-up in the next week or 2 if things are not improving at least by phone and we can consider referral to orthopedics for further evaluation and assessment.  - XR Finger Left 2 or More VWS; Future  - HM1(CBC and Differential)  - C-Reactive Protein  - Erythrocyte Sedimentation Rate  - Uric Acid  - HM1 (CBC with Diff)        Ceci Cosby MD      PROGRESS NOTE   4/14/2017    SUBJECTIVE:  Myron Meng is a 81 y.o. female  who presents for pain in her thumb.     The pain started 15 days ago. She doesn't remember hurting the thumb. It happened all on its own and quite suddenly. She feels like most of the time it is tingling. The pain is quite a bit worse if she touches it or squeezes it. After she hits it and then presses it for some time it will get better. She has been taking tylenol for it as well as using some pain relieving balm as well. She will take tylnenol before bed and that does help her sleep. The balm doesn't seem to help.  She is quite bothered by this and is hoping to have an answer today with happening.  She is difficulty with movement in the thumb.  The pain is quite centered over the CMC joint.  There is no redness.  Chief Complaint   Patient presents with     Hand Pain     Patient has been having pain in the thumb of the left hand for the past 15 days. The pain is described as tingling but can become sharp when the area is touched. No known injury to the site.          Patient Active Problem List   Diagnosis     Acute Subdural Hematoma      Helicobacter Pylori (H. Pylori) Infection     Lower Back Pain     Chronic Reflux Esophagitis     Type 2 diabetes mellitus     Benign Essential Hypertension     Coronary Artery Disease     Carcinoma Of The Large Intestine     Hyperlipidemia     Esophageal Varices     Cirrhosis     Cholelithiasis     GERD (gastroesophageal reflux disease)     Hiatal hernia     Anxiety     Osteoporosis       Current Outpatient Prescriptions   Medication Sig Dispense Refill     lisinopril (PRINIVIL,ZESTRIL) 30 MG tablet TAKE 1 TABLET (30 MG TOTAL) BY MOUTH DAILY. 90 tablet 1     metFORMIN (GLUCOPHAGE) 500 MG tablet TAKE 2 TABLETS (1,000 MG TOTAL) BY MOUTH 2 (TWO) TIMES A DAY. 360 tablet 1     metoprolol succinate (TOPROL-XL) 100 MG 24 hr tablet TAKE 1 TABLET (100 MG TOTAL) BY MOUTH DAILY. 90 tablet 1     pantoprazole (PROTONIX) 40 MG tablet Take 1 tablet (40 mg total) by mouth 2 (two) times a day. 180 tablet 1     simvastatin (ZOCOR) 20 MG tablet TAKE 1 TABLET (20 MG TOTAL) BY MOUTH BEDTIME. 90 tablet 1     benzonatate (TESSALON PERLES) 100 MG capsule Take 1 capsule (100 mg total) by mouth every 6 (six) hours as needed for cough. 30 capsule 0     nitroglycerin (NITROSTAT) 0.4 MG SL tablet Place 0.4 mg under the tongue every 5 (five) minutes as needed for chest pain. For up to 3 doses. Call 911 if persists.       ranitidine (ZANTAC) 150 MG tablet Take 1 tablet (150 mg total) by mouth bedtime. 90 tablet 1     No current facility-administered medications for this visit.        History   Smoking Status     Never Smoker   Smokeless Tobacco     Not on file           OBJECTIVE:        Recent Results (from the past 240 hour(s))   Erythrocyte Sedimentation Rate   Result Value Ref Range    Sed Rate 25 (H) 0 - 20 mm/hr   HM1 (CBC with Diff)   Result Value Ref Range    WBC 5.1 4.0 - 11.0 thou/uL    RBC 4.49 3.80 - 5.40 mill/uL    Hemoglobin 10.3 (L) 12.0 - 16.0 g/dL    Hematocrit 31.8 (L) 35.0 - 47.0 %    MCV 71 (L) 80 - 100 fL    MCH 22.8 (L) 27.0  - 34.0 pg    MCHC 32.3 32.0 - 36.0 g/dL    RDW 17.7 (H) 11.0 - 14.5 %    Platelets 216 140 - 440 thou/uL    MPV 7.9 7.0 - 10.0 fL    Neutrophils % 45 (L) 50 - 70 %    Lymphocytes % 45 (H) 20 - 40 %    Monocytes % 7 2 - 10 %    Eosinophils % 2 0 - 6 %    Basophils % 1 0 - 2 %    Neutrophils Absolute 2.3 2.0 - 7.7 thou/uL    Lymphocytes Absolute 2.3 0.8 - 4.4 thou/uL    Monocytes Absolute 0.3 0.0 - 0.9 thou/uL    Eosinophils Absolute 0.1 0.0 - 0.4 thou/uL    Basophils Absolute 0.0 0.0 - 0.2 thou/uL       Vitals:    04/14/17 1150   BP: 122/76   Patient Site: Left Arm   Patient Position: Sitting   Cuff Size: Adult Regular   Pulse: 74   Weight: 103 lb 6.4 oz (46.9 kg)     Weight: 103 lb 6.4 oz (46.9 kg)        Physical Exam:  GENERAL APPEARANCE: A&A, NAD, well hydrated, well nourished  SKIN:  Normal skin turgor, no lesions/rashes   HEENT: moist mucous membranes, no rhinorrhea   EXTREMITY: no edema, CMC joint is tender to palpation over the dorsal aspect, no tenderness to palpation over the palmar aspect.  She has pain with palpation if she grabs her thumb but no worsening of the pain with ulnar deviation she has limited opposition having difficulty bringing her thumb to her fifth finger.  No swelling, no redness  NEURO: no gross deficits

## 2021-06-10 NOTE — PROGRESS NOTES
SUBJECTIVE: Myron Meng is a 81 y.o.   female who presents today  With her son-in-law for follow-up of her left thumb pain. She saw Dr. Cosby on 4/14/17 for evaluation. Lab work was done which was mostly normal including the CRP, however she had a mildly elevated said rate at 25. X-ray was done of the hand which showed mild arthritis and radiologist mentioned that she had good joint space given her age. Dr. Cosby suggested she use ibuprofen. Unfortunately the patient has lots of stomach issues. It has been somewhat hard on her stomach and has not taken away the pain effectively even at 600 mg. She was given a brace which she believes was helpful for the pain. She has only been using it during the day and we discussed she could be using it as night as well to see if there is any benefit. Notably she does not have the pain in the MCP joint. She tells me it is in the distal knuckles and the bone between.    OBJECTIVE: /70  Pulse 60  Wt 102 lb 9.6 oz (46.5 kg)  BMI 19.39 kg/m2  General:  Elderly  female in no acute distress  Heart:  Regular rate and rhythm without murmur  Lungs:  Clear bilaterally  Abdomen:  Soft, nontender  Extremities:  Left hand thumb shows no deformity of joints, no redness, no obvious swelling or warmth. She has full range of motion.    ASSESSMENT & PLAN:    1. Thumb pain, left  predniSONE (DELTASONE) 20 MG tablet    Ambulatory referral to Orthopedic Surgery      I did offer her a referral to [facility] for evaluation and treatment. We discussed that perhaps a steroid injection might be helpful. I suggested a prednisone burst to try since the sed rate is elevated and it'll be a couple of days before she can see the specialist. They will follow up in June or July for her diabetes check.    Patient Active Problem List   Diagnosis     Acute Subdural Hematoma     Helicobacter Pylori (H. Pylori) Infection     Lower Back Pain     Chronic Reflux Esophagitis     Type 2  diabetes mellitus     Benign Essential Hypertension     Coronary Artery Disease     Carcinoma Of The Large Intestine     Hyperlipidemia     Esophageal Varices     Cirrhosis     Cholelithiasis     GERD (gastroesophageal reflux disease)     Hiatal hernia     Anxiety     Osteoporosis       Current Outpatient Prescriptions on File Prior to Visit   Medication Sig Dispense Refill     lisinopril (PRINIVIL,ZESTRIL) 30 MG tablet TAKE 1 TABLET (30 MG TOTAL) BY MOUTH DAILY. 90 tablet 1     metFORMIN (GLUCOPHAGE) 500 MG tablet TAKE 2 TABLETS (1,000 MG TOTAL) BY MOUTH 2 (TWO) TIMES A DAY. 360 tablet 1     metoprolol succinate (TOPROL-XL) 100 MG 24 hr tablet TAKE 1 TABLET (100 MG TOTAL) BY MOUTH DAILY. 90 tablet 1     pantoprazole (PROTONIX) 40 MG tablet Take 1 tablet (40 mg total) by mouth 2 (two) times a day. 180 tablet 1     simvastatin (ZOCOR) 20 MG tablet TAKE 1 TABLET (20 MG TOTAL) BY MOUTH BEDTIME. 90 tablet 1     benzonatate (TESSALON PERLES) 100 MG capsule Take 1 capsule (100 mg total) by mouth every 6 (six) hours as needed for cough. 30 capsule 0     nitroglycerin (NITROSTAT) 0.4 MG SL tablet Place 0.4 mg under the tongue every 5 (five) minutes as needed for chest pain. For up to 3 doses. Call 911 if persists.       ranitidine (ZANTAC) 150 MG tablet Take 1 tablet (150 mg total) by mouth bedtime. 90 tablet 1     No current facility-administered medications on file prior to visit.

## 2021-06-12 NOTE — PROGRESS NOTES
Assessment and Plan:   Return to clinic in 6 months time for the next diabetes check.    Patient has been advised of split billing requirements and indicates understanding: Yes     1. Medicare annual wellness visit, subsequent    2. Type 2 diabetes mellitus without complication, without long-term current use of insulin (H)  Well-controlled, will monitor lab work and refill meds when needed.  - Glycosylated Hemoglobin A1c    3. Coronary atherosclerosis due to lipid rich plaque  This has been very stable since she has been my patient.  Previously had stents.    4. Benign Essential Hypertension  Marginally controlled.  Will monitor labs  - Basic Metabolic Panel    5. Hyperlipidemia, unspecified hyperlipidemia type  LDL was 62 in July.    6. Gastroesophageal reflux disease without esophagitis  Currently on PPI and needs to continue with it.    7. Cirrhosis of liver without ascites, unspecified hepatic cirrhosis type (H)  Last liver function labs were normal.        The patient's current medical problems were reviewed.    The following high BMI interventions were performed this visit: encouragement to exercise and lifestyle education regarding diet  The following health maintenance schedule was reviewed with the patient and provided in printed form in the after visit summary:   Health Maintenance   Topic Date Due     HEPATITIS B VACCINES (1 of 3 - Risk 3-dose series) 12/20/1954     ZOSTER VACCINES (1 of 2) 12/20/1986     DXA SCAN  09/07/2018     DIABETIC EYE EXAM  08/12/2020     DIABETIC FOOT EXAM  11/01/2020     A1C  01/09/2021     MICROALBUMIN  02/28/2021     BMP  07/09/2021     LIPID  07/09/2021     ADVANCE CARE PLANNING  09/02/2021     MEDICARE ANNUAL WELLNESS VISIT  11/03/2021     FALL RISK ASSESSMENT  11/03/2021     TD 18+ HE  02/06/2028     Pneumococcal Vaccine: 65+ Years  Completed     INFLUENZA VACCINE RULE BASED  Completed     Pneumococcal Vaccine: Pediatrics (0 to 5 Years) and At-Risk Patients (6 to 64  Years)  Aged Out        Subjective:   Chief Complaint: Myron Meng is an 84 y.o. female here for an Annual Wellness visit.   HPI: Patient has a history of coronary artery disease, type 2 diabetes, hypertension, hyperlipidemia, reflux disease with a history of gastritis and ulcer, and osteoporosis.  She is due for her diabetic eye exam and foot exam.  They are holding off on the exam for her eyes due to Covid.  Her last A1c was 7.6%.  She has not had any trouble with her heart for many years now.    Review of Systems:    General ROS: negative  Psychological ROS: negative  Ophthalmic ROS: positive for - dry eyes  ENT ROS: positive for - hearing change  Allergy and Immunology ROS: negative  Hematological and Lymphatic ROS: negative  Endocrine ROS: negative  Breast ROS: negative  Respiratory ROS: negative  Cardiovascular ROS: positive for - dyspnea on exertion  Gastrointestinal ROS: negative  Genito-Urinary ROS: negative  Musculoskeletal ROS: negative  Neurological ROS: negative  Dermatological ROS: negative      Patient Care Team:  Neelima Patel MD as PCP - General  Neelima Patel MD as Assigned PCP     Patient Active Problem List   Diagnosis     Acute Subdural Hematoma     Helicobacter Pylori (H. Pylori) Infection     Lower Back Pain     Chronic Reflux Esophagitis     Type 2 diabetes mellitus (H)     Benign Essential Hypertension     Coronary Artery Disease     Hyperlipidemia     Cirrhosis     Cholelithiasis     GERD (gastroesophageal reflux disease)     Hiatal hernia     Anxiety     Osteoporosis     History of malignant neoplasm of large intestine     Portal hypertension (H)     Past Medical History:   Diagnosis Date     Chronic reflux esophagitis      Cirrhosis (H)      Coronary artery disease      Hyperlipidemia      Hypertension      Type II diabetes mellitus (H)       Past Surgical History:   Procedure Laterality Date     LEFT COLECTOMY Left       Family History   Family history unknown: Yes       Social History     Socioeconomic History     Marital status:      Spouse name: Not on file     Number of children: Not on file     Years of education: Not on file     Highest education level: Not on file   Occupational History     Not on file   Social Needs     Financial resource strain: Not on file     Food insecurity     Worry: Not on file     Inability: Not on file     Transportation needs     Medical: Not on file     Non-medical: Not on file   Tobacco Use     Smoking status: Never Smoker     Smokeless tobacco: Never Used   Substance and Sexual Activity     Alcohol use: Not on file     Drug use: Not on file     Sexual activity: Not on file   Lifestyle     Physical activity     Days per week: Not on file     Minutes per session: Not on file     Stress: Not on file   Relationships     Social connections     Talks on phone: Not on file     Gets together: Not on file     Attends Ten[employer] service: Not on file     Active member of club or organization: Not on file     Attends meetings of clubs or organizations: Not on file     Relationship status: Not on file     Intimate partner violence     Fear of current or ex partner: Not on file     Emotionally abused: Not on file     Physically abused: Not on file     Forced sexual activity: Not on file   Other Topics Concern     Not on file   Social History Narrative    The patient lives at home with her family.       Current Outpatient Medications   Medication Sig Dispense Refill     lisinopriL (PRINIVIL,ZESTRIL) 30 MG tablet TAKE 1 TABLET BY MOUTH EVERY DAY 90 tablet 2     metFORMIN (GLUCOPHAGE) 500 MG tablet TAKE 2 TABLETS BY MOUTH TWICE A  tablet 3     metoprolol succinate (TOPROL-XL) 100 MG 24 hr tablet TAKE 1 TABLET BY MOUTH EVERY DAY 90 tablet 2     pantoprazole (PROTONIX) 40 MG tablet TAKE 1 TABLET BY MOUTH EVERY DAY 90 tablet 3     simvastatin (ZOCOR) 20 MG tablet TAKE 1 TABLET BY MOUTH AT BEDTIME 90 tablet 3     nitroglycerin (NITROSTAT) 0.4 MG SL  tablet Place 1 tablet (0.4 mg total) under the tongue every 5 (five) minutes as needed for chest pain For up to 3 doses. Call 911 if persists. . 30 tablet 0     No current facility-administered medications for this visit.       Objective:   Vital Signs:   Visit Vitals  /80   Pulse 78   Ht 5' (1.524 m)   Wt 105 lb (47.6 kg)   SpO2 96%   BMI 20.51 kg/m           VisionScreening:  No exam data present     PHYSICAL EXAM  General appearance - alert, well appearing, and in no distress and normal appearing weight  Mental status - normal mood, behavior, speech, dress, motor activity, and thought processes  Eyes - pupils equal and reactive, extraocular eye movements intact  Ears - bilateral TM's and external ear canals normal  Nose - normal and patent, no erythema, discharge or polyps  Mouth - mucous membranes moist, pharynx normal without lesions  Neck - supple, no significant adenopathy, carotids upstroke normal bilaterally, no bruits, thyroid exam: thyroid is normal in size without tenderness  Lymphatics - no palpable lymphadenopathy, no hepatosplenomegaly  Chest - clear to auscultation, no wheezes, rales or rhonchi, symmetric air entry  Heart - normal rate and regular rhythm, S1 and S2 normal, no murmurs noted  Abdomen - soft, nontender, mild distended, no masses noted  bowel sounds normal  Breasts - not examined  Pelvic - examination not indicated  Back exam - full range of motion, no tenderness, palpable spasm or pain on motion  Neurological - alert, oriented, normal speech, no focal findings or movement disorder noted, cranial nerves II through XII intact, DTR's normal and symmetric  Musculoskeletal - no joint tenderness, deformity or swelling  Extremities - peripheral pulses normal, no pedal edema, no clubbing or cyanosis  Skin - normal coloration and turgor, no rashes, no suspicious skin lesions noted  Diabetic foot exam shows no deformity, very nice soft skin without any lesions and perfect nails.  No  neuropathy on filament testing.        Assessment Results 11/3/2020   Activities of Daily Living No help needed   Instrumental Activities of Daily Living 1 - Full function   Mini Cog Total Score 4   Some recent data might be hidden     A Mini-Cog score of 0-2 suggests the possibility of dementia, score of 3-5 suggests no dementia    Identified Health Risks:     She is at risk for lack of exercise and has been provided with information to increase physical activity for the benefit of her well-being.  The patient reports that she has difficulty with instrumental activities of daily living.  She does not and has never driven and so relies on her family for transportation.  The patient was provided with written information regarding signs of hearing loss.  Information regarding advance directives (living [place]), including where she can download the appropriate form, was provided to the patient via the AVS.

## 2021-06-12 NOTE — TELEPHONE ENCOUNTER
Refill Approved    Rx renewed per Medication Renewal Policy. Medication was last renewed on 2/152020.    Cheli ARIS Bhatia, [facility] Triage/Med Refill 10/20/2020     Requested Prescriptions   Pending Prescriptions Disp Refills     lisinopriL (PRINIVIL,ZESTRIL) 30 MG tablet [Pharmacy Med Name: LISINOPRIL 30 MG TABLET] 90 tablet 2     Sig: TAKE 1 TABLET BY MOUTH EVERY DAY       Ace Inhibitors Refill Protocol Passed - 10/19/2020 12:27 AM        Passed - PCP or prescribing provider visit in past 12 months       Last office visit with prescriber/PCP: 7/9/2020 Neelima Patel MD OR same dept: 7/9/2020 Neelima Patel MD OR same specialty: 7/9/2020 Neelima Patel MD  Last physical: Visit date not found Last MTM visit: Visit date not found   Next visit within 3 mo: Visit date not found  Next physical within 3 mo: Visit date not found  Prescriber OR PCP: Neelima Patel MD ([facility])  Last diagnosis associated with med order: 1. Benign Essential Hypertension  - lisinopriL (PRINIVIL,ZESTRIL) 30 MG tablet [Pharmacy Med Name: LISINOPRIL 30 MG TABLET]; TAKE 1 TABLET BY MOUTH EVERY DAY  Dispense: 90 tablet; Refill: 2  - metoprolol succinate (TOPROL-XL) 100 MG 24 hr tablet [Pharmacy Med Name: METOPROLOL SUCC  MG TAB]; TAKE 1 TABLET BY MOUTH EVERY DAY  Dispense: 90 tablet; Refill: 2    If protocol passes may refill for 12 months if within 3 months of last provider visit (or a total of 15 months).             Passed - Serum Potassium in past 12 months     Lab Results   Component Value Date    Potassium 4.4 07/09/2020             Passed - Blood pressure filed in past 12 months     BP Readings from Last 1 Encounters:   07/09/20 122/72             Passed - Serum Creatinine in past 12 months     Creatinine   Date Value Ref Range Status   07/09/2020 1.07 0.60 - 1.10 mg/dL Final                metoprolol succinate (TOPROL-XL) 100 MG 24 hr tablet [Pharmacy Med Name: METOPROLOL SUCC  MG TAB] 90  tablet 2     Sig: TAKE 1 TABLET BY MOUTH EVERY DAY       Beta-Blockers Refill Protocol Passed - 10/19/2020 12:27 AM        Passed - PCP or prescribing provider visit in past 12 months or next 3 months     Last office visit with prescriber/PCP: 7/9/2020 Neelima Patel MD OR same dept: 7/9/2020 Neelima Patel MD OR same specialty: 7/9/2020 Neelima Patel MD  Last physical: Visit date not found Last MTM visit: Visit date not found   Next visit within 3 mo: Visit date not found  Next physical within 3 mo: Visit date not found  Prescriber OR PCP: Neelima Patel MD ([facility])  Last diagnosis associated with med order: 1. Benign Essential Hypertension  - lisinopriL (PRINIVIL,ZESTRIL) 30 MG tablet [Pharmacy Med Name: LISINOPRIL 30 MG TABLET]; TAKE 1 TABLET BY MOUTH EVERY DAY  Dispense: 90 tablet; Refill: 2  - metoprolol succinate (TOPROL-XL) 100 MG 24 hr tablet [Pharmacy Med Name: METOPROLOL SUCC  MG TAB]; TAKE 1 TABLET BY MOUTH EVERY DAY  Dispense: 90 tablet; Refill: 2    If protocol passes may refill for 12 months if within 3 months of last provider visit (or a total of 15 months).             Passed - Blood pressure filed in past 12 months     BP Readings from Last 1 Encounters:   07/09/20 122/72

## 2021-06-14 NOTE — PROGRESS NOTES
1. Eye redness       Med list reconciled    Language line used for OV, Ivonne  # [id number]    ASSESSMENT/PLAN:     Body Mass Index was not assessed due to normal BMI.    1. Eye redness    -viral conjunctivitis     Recommend use of Artifical tears (keep in fridge), 2 drops, 4 times per day for 10 days.  Patient instructed to follow-up with the nearest ER if she develops severe eye pain, if the eye swells shut or if she experiences any loss of vision.        The visit lasted a total of 25 minutes face to face with the patient.  Over 50% of the time spent counseling and educating the patient about above content.      Kiana Alvarado NP          SUBJECTIVE:  Myron Meng is a 81 y.o. female who presents with left eye redness that started 1 day ago.  She describes the discomfort as a burning sensation.  She states the discomfort is constant.  She has been placing Renew over-the-counter eyedrops into her left eye several times per day.  Aggravating factors: Rubbing the eye.  Relieving factors: No other therapies have been tried at this point.  She denies any recent congestion, ear pain or sinus pain.  She denies any crusting or drainage coming from the eye.  She is rating her pain a 2 out of 10 today.  She denies any blunt trauma to the eye.  She has not been exposed to anybody that has been ill recently. Denies blurry vision or loss of vision, no photophobia. Let eye extremely blood shot today. Suspect from the OTC drops she has been using.   Chief Complaint   Patient presents with     Left eye redness         Patient Active Problem List   Diagnosis     Acute Subdural Hematoma     Helicobacter Pylori (H. Pylori) Infection     Lower Back Pain     Chronic Reflux Esophagitis     Type 2 diabetes mellitus     Benign Essential Hypertension     Coronary Artery Disease     Carcinoma Of The Large Intestine     Hyperlipidemia     Esophageal Varices     Cirrhosis     Cholelithiasis     GERD (gastroesophageal reflux  disease)     Hiatal hernia     Anxiety     Osteoporosis       Current Outpatient Prescriptions   Medication Sig Dispense Refill     lisinopril (PRINIVIL,ZESTRIL) 30 MG tablet TAKE 1 TABLET (30 MG TOTAL) BY MOUTH DAILY. 90 tablet 1     metFORMIN (GLUCOPHAGE) 500 MG tablet TAKE 2 TABLETS (1,000 MG TOTAL) BY MOUTH 2 (TWO) TIMES A DAY. 360 tablet 1     metoprolol succinate (TOPROL-XL) 100 MG 24 hr tablet TAKE 1 TABLET (100 MG TOTAL) BY MOUTH DAILY. 90 tablet 1     pantoprazole (PROTONIX) 40 MG tablet TAKE 1 TABLET (40 MG TOTAL) BY MOUTH 2 (TWO) TIMES A DAY. 180 tablet 1     simvastatin (ZOCOR) 20 MG tablet TAKE 1 TABLET (20 MG TOTAL) BY MOUTH BEDTIME. 90 tablet 1     nitroglycerin (NITROSTAT) 0.4 MG SL tablet Place 0.4 mg under the tongue every 5 (five) minutes as needed for chest pain. For up to 3 doses. Call 911 if persists.       ranitidine (ZANTAC) 150 MG tablet Take 1 tablet (150 mg total) by mouth bedtime. 90 tablet 1     No current facility-administered medications for this visit.        History   Smoking Status     Never Smoker   Smokeless Tobacco     Not on file       REVIEW OF SYSTEMS: Denies fever/chills/sore throat/rhinorrhea/ear pain/swollen glands/shortness of breath/discomfort of chest/abdominal pain/changes in bowel habits/urinary symptoms/rash.      TOBACCO USE:  History   Smoking Status     Never Smoker   Smokeless Tobacco     Not on file     Social History     Social History     Marital status:      Spouse name: N/A     Number of children: N/A     Years of education: N/A     Occupational History     Not on file.     Social History Main Topics     Smoking status: Never Smoker     Smokeless tobacco: Not on file     Alcohol use Not on file     Drug use: Not on file     Sexual activity: Not on file     Other Topics Concern     Not on file     Social History Narrative    The patient lives at home with her family.          OBJECTIVE:            Vitals:    12/12/17 1549   BP: 120/64   Pulse: 63    Resp: 16   Temp: 97.5  F (36.4  C)   SpO2: 99%     Weight: 100 lb 2 oz (45.4 kg)  Wt Readings from Last 3 Encounters:   12/12/17 100 lb 2 oz (45.4 kg)   05/09/17 102 lb 9.6 oz (46.5 kg)   04/14/17 103 lb 6.4 oz (46.9 kg)     Body mass index is 19.23 kg/(m^2).        Physical Exam:  GENERAL APPEARANCE: A&A, NAD, well hydrated, well nourished  HEAD: atraumatic, no deformity  EYES: PERRL, EOM's intact, severely bloodshot in left eye, no swelling, drainage or cyst formation, Negative for stye.  EARS: TM's normal, gray with nl light reflex  NOSE: no post nasal drainage or thrush  MOUTH: without erythema, exudate or thrush  NECK: Supple, without lymphadenopathy, no thyroid mass, no JVD, no bruit  CV: RRR, no M/G/R   LUNGS: CTAB, normal respiratory effort  ABDOMEN: S&NT, no masses, no organomegaly, BS present x4    SKIN:  Normal skin turgor, no lesions/rashes, warm and dry

## 2021-06-15 NOTE — PROGRESS NOTES
"SUBJECTIVE: Myron Meng is a 85 y.o. Patient Declined female who presents today with her son-in-law for a med check.  I last saw her for an annual wellness visit on November 3.  She is having issues with her stomach.  She has had long time problems with rather severe reflux disease.  She takes a PPI on a regular basis.  Now she is having pain, increased burping, early satiety and feelings of fullness.  She feels she cannot eat even if she is hungry.  She feels a \"heaviness\".  She has had some mild diarrhea.  We discussed it might be a good idea to get a referral to [facility].  She has had a history of colon surgery back in 2005.  She will likely need an EGD at minimum.  She has a history of coronary artery disease and type 2 diabetes.  These have been well controlled for a long time.  She does have some very mild chronic kidney disease.  Her blood pressure today is well controlled and her last LDL was 62 back in July 2020.  Her last A1c was 7.8%.  I need a urine from her for a microalbumin creatinine ratio.  They are not checking blood sugars as she is not on insulin and in fact is only on Metformin.    OBJECTIVE: /62   Pulse 80   Ht 5' (1.524 m)   Wt 104 lb (47.2 kg)   SpO2 96%   Breastfeeding No   BMI 20.31 kg/m    General: Well-appearing normal weight octogenarian female in no acute distress  Heart: Regular rate and rhythm without murmur  Lungs: Clear bilaterally  Abdomen: Soft, somewhat tender in the epigastric area but also less so in other areas, perhaps somewhat bloated  Extremities: Warm, dry and without edema      ASSESSMENT & PLAN:     1. Gastroesophageal reflux disease without esophagitis  Long history of reflux requiring PPI treatment.  History of cirrhosis.  Will monitor hemoglobin as she has had low hemoglobin in the past.  I will also refer her to be seen.  - HM2(CBC w/o Differential)  - Ambulatory referral to G[facility]enterology - [facility]    2. Abdominal bloating  - Ambulatory " referral to Gastroenterology - [facility]    3. Abdominal pain, generalized  - Ambulatory referral to Gastroenterology [facility]    4. Type 2 diabetes mellitus without complication, without long-term current use of insulin (H)  Generally well enough controlled for someone her age.  Will monitor labs.  - Glycosylated Hemoglobin A1c  - Microalbumin, Random Urine    5. Coronary atherosclerosis due to lipid rich plaque  This has been well controlled for a long time.      6. Stage 3a chronic kidney disease  Very mild but would like to monitor.    7. Benign Essential Hypertension  Well-controlled.  Will monitor labs.  - Basic Metabolic Panel    8. Hyperlipidemia, unspecified hyperlipidemia type  Historically well controlled, will monitor labs.  - Lipid [place] RANDOM    I will get back to them on their lab results by mail and only call with grossly abnormal values.  I will refer them to [facility].  I will refill her meds as needed.  I would like to see her back in 4 months time or sooner on an as-needed basis.    Patient Active Problem List   Diagnosis     Acute Subdural Hematoma     Helicobacter Pylori (H. Pylori) Infection     Lower Back Pain     Chronic Reflux Esophagitis     Type 2 diabetes mellitus (H)     Benign Essential Hypertension     Coronary Artery Disease     Hyperlipidemia     Cirrhosis     Cholelithiasis     GERD (gastroesophageal reflux disease)     Hiatal hernia     Anxiety     Osteoporosis     History of malignant neoplasm of large intestine     Portal hypertension (H)     Chronic kidney disease, stage 3       Current Outpatient Medications on File Prior to Visit   Medication Sig Dispense Refill     lisinopriL (PRINIVIL,ZESTRIL) 30 MG tablet TAKE 1 TABLET BY MOUTH EVERY DAY 90 tablet 2     metFORMIN (GLUCOPHAGE) 500 MG tablet TAKE 2 TABLETS BY MOUTH TWICE A  tablet 3     metoprolol succinate (TOPROL-XL) 100 MG 24 hr tablet TAKE 1 TABLET BY MOUTH EVERY DAY 90 tablet 2      neomycin-polymyxin-dexamethamethasone (POLYDEX) 3.5 mg/g-10,000 unit/g-0.1 % Oint APPLY 0.25 INCH RIBBON TO EYELIDS EVERY EVENING       neomycin-polymyxin-dexamethasone (MAXITROL) 3.5mg/mL-10,000 unit/mL-0.1 % ophthalmic suspension INSTILL 1 DROP INTO LEFT EYE THREE TIMES A DAY       pantoprazole (PROTONIX) 40 MG tablet TAKE 1 TABLET BY MOUTH EVERY DAY 90 tablet 3     RESTASIS 0.05 % ophthalmic emulsion INSTILL 1 DROP INTO BOTH EYES TWICE A DAY       simvastatin (ZOCOR) 20 MG tablet TAKE 1 TABLET BY MOUTH AT BEDTIME 90 tablet 3     nitroglycerin (NITROSTAT) 0.4 MG SL tablet Place 1 tablet (0.4 mg total) under the tongue every 5 (five) minutes as needed for chest pain For up to 3 doses. Call 911 if persists. . 30 tablet 0     No current facility-administered medications on file prior to visit.

## 2021-06-15 NOTE — PROGRESS NOTES
SUBJECTIVE: Myron Meng is a 82 y.o. Patient Declined female who presents today with her son for her diabetes check.  She has not been seen for diabetes check since March 2017.  She has been seen for acute issues.  Her last A1c was 6.5% and generally she has been well controlled as of the last couple years.  She does need refills of all of her medications.  She is pretty active still and is cooking and doing things around the house.  Her blood pressure today is controlled.  She does have some issues with stomach pain that have been chronic despite the use of a PPI twice daily with Zantac.  She does not have reflux anymore.  She does have osteoporosis and is not currently taking vitamin D, but her son does and so he will make sure she gets a daily dose.  We discussed that I will check the lab level.  They have no other complaints and she is feeling well.  History of remote coronary artery disease which has not been problematic for a number of years.    OBJECTIVE: /70  Pulse 66  Wt 103 lb 9.6 oz (47 kg)  SpO2 98%  BMI 19.9 kg/m2  General: Healthy-appearing, normal weight, east  female looking younger than age stated  Heart: Regular rate and rhythm without murmur  Lungs: Clear bilaterally  Abdomen: Soft, tender in the epigastric area, otherwise normal exam  Extremities: Warm, dry and without edema, varicosities noted  She does have a  ASSESSMENT & PLAN:    1. Type 2 diabetes mellitus  Glycosylated Hemoglobin A1c    Microalbumin, Random Urine   2. Coronary atherosclerosis     3. Benign Essential Hypertension  Comprehensive Metabolic Panel   4. Hyperlipidemia, unspecified hyperlipidemia type  Lipid [place] RANDOM   5. Gastroesophageal reflux disease without esophagitis     6. Osteoporosis  Vitamin D, Total (25-Hydroxy)   7. Preventative health care  Influenza High Dose, Seasonal 65+ yrs    Td, Preservative Free (green label)     I will check the above-mentioned lab work.  I will get back to them by  mail and only call with grossly abnormal values.  She did need her flu shot and a tetanus update and so they were given today.  She is to see me back in no longer than 6 months time and possibly sooner depending on lab results.    Patient Active Problem List   Diagnosis     Acute Subdural Hematoma     Helicobacter Pylori (H. Pylori) Infection     Lower Back Pain     Chronic Reflux Esophagitis     Type 2 diabetes mellitus     Benign Essential Hypertension     Coronary Artery Disease     Carcinoma Of The Large Intestine     Hyperlipidemia     Esophageal Varices     Cirrhosis     Cholelithiasis     GERD (gastroesophageal reflux disease)     Hiatal hernia     Anxiety     Osteoporosis       Current Outpatient Prescriptions on File Prior to Visit   Medication Sig Dispense Refill     lisinopril (PRINIVIL,ZESTRIL) 30 MG tablet TAKE 1 TABLET (30 MG TOTAL) BY MOUTH DAILY. 90 tablet 1     metFORMIN (GLUCOPHAGE) 500 MG tablet TAKE 2 TABLETS (1,000 MG TOTAL) BY MOUTH 2 (TWO) TIMES A DAY. 360 tablet 1     metoprolol succinate (TOPROL-XL) 100 MG 24 hr tablet TAKE 1 TABLET (100 MG TOTAL) BY MOUTH DAILY. 90 tablet 1     nitroglycerin (NITROSTAT) 0.4 MG SL tablet Place 0.4 mg under the tongue every 5 (five) minutes as needed for chest pain. For up to 3 doses. Call 911 if persists.       pantoprazole (PROTONIX) 40 MG tablet TAKE 1 TABLET (40 MG TOTAL) BY MOUTH 2 (TWO) TIMES A DAY. 180 tablet 1     ranitidine (ZANTAC) 150 MG tablet Take 1 tablet (150 mg total) by mouth bedtime. 90 tablet 1     simvastatin (ZOCOR) 20 MG tablet TAKE 1 TABLET (20 MG TOTAL) BY MOUTH BEDTIME. 90 tablet 1     lisinopril (PRINIVIL,ZESTRIL) 30 MG tablet TAKE 1 TABLET (30 MG TOTAL) BY MOUTH DAILY. 30 tablet 0     metFORMIN (GLUCOPHAGE) 500 MG tablet TAKE 2 TABLETS (1,000 MG TOTAL) BY MOUTH 2 (TWO) TIMES A DAY. 120 tablet 0     metoprolol succinate (TOPROL-XL) 100 MG 24 hr tablet TAKE 1 TABLET (100 MG TOTAL) BY MOUTH DAILY. 30 tablet 0     simvastatin (ZOCOR) 20  MG tablet TAKE 1 TABLET (20 MG TOTAL) BY MOUTH BEDTIME. 30 tablet 0     No current facility-administered medications on file prior to visit.

## 2021-06-17 NOTE — TELEPHONE ENCOUNTER
Patient calling, and just realized he and mother have an appointment tomorrow with Dr. Kincaid, and will request medications needed tomorrow.      Jessica Fleming RN  [facility] Triage/refill nurse    .      Reason for Disposition    Caller has medication question only, adult not sick, and triager answers question    Protocols used: MEDICATION QUESTION CALL-A-[place]

## 2021-06-17 NOTE — TELEPHONE ENCOUNTER
Refill Approved    Rx renewed per Medication Renewal Policy. Medication was last renewed on 5/8/20.    Emmanuel ARISErin Raines, [facility] Triage/Med Refill 4/27/2021     Requested Prescriptions   Pending Prescriptions Disp Refills     pantoprazole (PROTONIX) 40 MG tablet [Pharmacy Med Name: PANTOPRAZOLE SOD DR 40 MG TAB] 90 tablet 3     Sig: TAKE 1 TABLET BY MOUTH EVERY DAY       GI Medications Refill Protocol Passed - 4/26/2021  6:40 PM        Passed - PCP or prescribing provider visit in last 12 or next 3 months.     Last office visit with prescriber/PCP: 3/11/2021 Neelima Patel MD OR same dept: 3/11/2021 Neelima Patel MD OR same specialty: 3/11/2021 Neeliam Patel MD  Last physical: 11/3/2020 Last MTM visit: Visit date not found   Next visit within 3 mo: Visit date not found  Next physical within 3 mo: Visit date not found  Prescriber OR PCP: S[place]) DINA Patel MD  Last diagnosis associated with med order: 1. Gastroesophageal reflux disease without esophagitis  - pantoprazole (PROTONIX) 40 MG tablet [Pharmacy Med Name: PANTOPRAZOLE SOD DR 40 MG TAB]; TAKE 1 TABLET BY MOUTH EVERY DAY  Dispense: 90 tablet; Refill: 3    2. Hyperlipidemia, unspecified hyperlipidemia type  - simvastatin (ZOCOR) 20 MG tablet [Pharmacy Med Name: SIMVASTATIN 20 MG TABLET]; TAKE 1 TABLET BY MOUTH EVERYDAY AT BEDTIME  Dispense: 90 tablet; Refill: 3    3. Diabetes (H)  - metFORMIN (GLUCOPHAGE) 500 MG tablet [Pharmacy Med Name: METFORMIN  MG TABLET]; TAKE 2 TABLETS BY MOUTH TWICE A DAY  Dispense: 360 tablet; Refill: 3    If protocol passes may refill for 12 months if within 3 months of last provider visit (or a total of 15 months).                simvastatin (ZOCOR) 20 MG tablet [Pharmacy Med Name: SIMVASTATIN 20 MG TABLET] 90 tablet 3     Sig: TAKE 1 TABLET BY MOUTH EVERYDAY AT BEDTIME       Statins Refill Protocol (Hmg CoA Reductase Inhibitors) Passed - 4/26/2021  6:40 PM        Passed - PCP or  prescribing provider visit in past 12 months      Last office visit with prescriber/PCP: 3/11/2021 Neelima Patel MD OR same dept: 3/11/2021 Neelima Patel MD OR same specialty: 3/11/2021 Neelima Patel MD  Last physical: 11/3/2020 Last MTM visit: Visit date not found   Next visit within 3 mo: Visit date not found  Next physical within 3 mo: Visit date not found  Prescriber OR PCP: Neelima Patel MD ([facility])  Last diagnosis associated with med order: 1. Gastroesophageal reflux disease without esophagitis  - pantoprazole (PROTONIX) 40 MG tablet [Pharmacy Med Name: PANTOPRAZOLE SOD DR 40 MG TAB]; TAKE 1 TABLET BY MOUTH EVERY DAY  Dispense: 90 tablet; Refill: 3    2. Hyperlipidemia, unspecified hyperlipidemia type  - simvastatin (ZOCOR) 20 MG tablet [Pharmacy Med Name: SIMVASTATIN 20 MG TABLET]; TAKE 1 TABLET BY MOUTH EVERYDAY AT BEDTIME  Dispense: 90 tablet; Refill: 3    3. Diabetes (H)  - metFORMIN (GLUCOPHAGE) 500 MG tablet [Pharmacy Med Name: METFORMIN  MG TABLET]; TAKE 2 TABLETS BY MOUTH TWICE A DAY  Dispense: 360 tablet; Refill: 3    If protocol passes may refill for 12 months if within 3 months of last provider visit (or a total of 15 months).                metFORMIN (GLUCOPHAGE) 500 MG tablet [Pharmacy Med Name: METFORMIN  MG TABLET] 360 tablet 3     Sig: TAKE 2 TABLETS BY MOUTH TWICE A DAY       Metformin Refill Protocol Passed - 4/26/2021  6:40 PM        Passed - Blood pressure in last 12 months     BP Readings from Last 1 Encounters:   03/11/21 118/62             Passed - LFT or AST or ALT in last 12 months     Albumin   Date Value Ref Range Status   07/09/2020 3.4 (L) 3.5 - 5.0 g/dL Final     Bilirubin, Total   Date Value Ref Range Status   07/09/2020 0.4 0.0 - 1.0 mg/dL Final     Bilirubin, Direct   Date Value Ref Range Status   03/27/2015 0.2 <=0.5 mg/dL Final     Alkaline Phosphatase   Date Value Ref Range Status   07/09/2020 66 45 - 120 U/L Final     AST    Date Value Ref Range Status   07/09/2020 34 0 - 40 U/L Final     ALT   Date Value Ref Range Status   07/09/2020 25 0 - 45 U/L Final     Protein, Total   Date Value Ref Range Status   07/09/2020 8.0 6.0 - 8.0 g/dL Final                Passed - GFR or Serum Creatinine in last 6 months     GFR MDRD Non Af Amer   Date Value Ref Range Status   03/11/2021 44 (L) >60 mL/min/1.73m2 Final     GFR MDRD Af Amer   Date Value Ref Range Status   03/11/2021 53 (L) >60 mL/min/1.73m2 Final             Passed - Visit with PCP or prescribing provider visit in last 6 months or next 3 months     Last office visit with prescriber/PCP: 3/11/2021 OR same dept: 3/11/2021 Neelima Patel MD OR same specialty: 3/11/2021 Neelima Patel MD Last physical: 11/3/2020 Last MTM visit: Visit date not found         Next appt within 3 mo: Visit date not found  Next physical within 3 mo: Visit date not found  Prescriber OR PCP: Sintia) DINA Patel MD  Last diagnosis associated with med order: 1. Gastroesophageal reflux disease without esophagitis  - pantoprazole (PROTONIX) 40 MG tablet [Pharmacy Med Name: PANTOPRAZOLE SOD DR 40 MG TAB]; TAKE 1 TABLET BY MOUTH EVERY DAY  Dispense: 90 tablet; Refill: 3    2. Hyperlipidemia, unspecified hyperlipidemia type  - simvastatin (ZOCOR) 20 MG tablet [Pharmacy Med Name: SIMVASTATIN 20 MG TABLET]; TAKE 1 TABLET BY MOUTH EVERYDAY AT BEDTIME  Dispense: 90 tablet; Refill: 3    3. Diabetes (H)  - metFORMIN (GLUCOPHAGE) 500 MG tablet [Pharmacy Med Name: METFORMIN  MG TABLET]; TAKE 2 TABLETS BY MOUTH TWICE A DAY  Dispense: 360 tablet; Refill: 3     If protocol passes may refill for 12 months if within 3 months of last provider visit (or a total of 15 months).           Passed - A1C in last 6 months     Hemoglobin A1c   Date Value Ref Range Status   03/11/2021 7.7 (H) <=5.6 % Final               Passed - Microalbumin in last year      Microalbumin, Random Urine   Date Value Ref Range Status    03/11/2021 1.51 0.00 - 1.99 mg/dL Final

## 2021-06-17 NOTE — TELEPHONE ENCOUNTER
Refill Approved    Rx renewed per Medication Renewal Policy. Medication was last renewed on 5/14/20.    Emmanuel ARISErin Raines, [facility] Triage/Med Refill 5/7/2021     Requested Prescriptions   Pending Prescriptions Disp Refills     simvastatin (ZOCOR) 20 MG tablet [Pharmacy Med Name: SIMVASTATIN 20 MG TABLET] 90 tablet 3     Sig: TAKE 1 TABLET BY MOUTH EVERYDAY AT BEDTIME       Statins Refill Protocol (Hmg CoA Reductase Inhibitors) Passed - 5/6/2021  6:19 PM        Passed - PCP or prescribing provider visit in past 12 months      Last office visit with prescriber/PCP: 3/11/2021 Neelima Patel MD OR same dept: 3/11/2021 Neelima Patel MD OR same specialty: 3/11/2021 Neelima Patel MD  Last physical: 11/3/2020 Last MTM visit: Visit date not found   Next visit within 3 mo: Visit date not found  Next physical within 3 mo: Visit date not found  Prescriber OR PCP: S[place]ia) DINA Patel MD  Last diagnosis associated with med order: 1. Hyperlipidemia, unspecified hyperlipidemia type  - simvastatin (ZOCOR) 20 MG tablet [Pharmacy Med Name: SIMVASTATIN 20 MG TABLET]; TAKE 1 TABLET BY MOUTH EVERYDAY AT BEDTIME  Dispense: 90 tablet; Refill: 3    2. Diabetes (H)  - metFORMIN (GLUCOPHAGE) 500 MG tablet [Pharmacy Med Name: METFORMIN  MG TABLET]; TAKE 2 TABLETS BY MOUTH TWICE A DAY  Dispense: 360 tablet; Refill: 3    If protocol passes may refill for 12 months if within 3 months of last provider visit (or a total of 15 months).                metFORMIN (GLUCOPHAGE) 500 MG tablet [Pharmacy Med Name: METFORMIN  MG TABLET] 360 tablet 3     Sig: TAKE 2 TABLETS BY MOUTH TWICE A DAY       Metformin Refill Protocol Passed - 5/6/2021  6:19 PM        Passed - Blood pressure in last 12 months     BP Readings from Last 1 Encounters:   03/11/21 118/62             Passed - LFT or AST or ALT in last 12 months     Albumin   Date Value Ref Range Status   07/09/2020 3.4 (L) 3.5 - 5.0 g/dL Final      Bilirubin, Total   Date Value Ref Range Status   07/09/2020 0.4 0.0 - 1.0 mg/dL Final     Bilirubin, Direct   Date Value Ref Range Status   03/27/2015 0.2 <=0.5 mg/dL Final     Alkaline Phosphatase   Date Value Ref Range Status   07/09/2020 66 45 - 120 U/L Final     AST   Date Value Ref Range Status   07/09/2020 34 0 - 40 U/L Final     ALT   Date Value Ref Range Status   07/09/2020 25 0 - 45 U/L Final     Protein, Total   Date Value Ref Range Status   07/09/2020 8.0 6.0 - 8.0 g/dL Final                Passed - GFR or Serum Creatinine in last 6 months     GFR MDRD Non Af Amer   Date Value Ref Range Status   03/11/2021 44 (L) >60 mL/min/1.73m2 Final     GFR MDRD Af Amer   Date Value Ref Range Status   03/11/2021 53 (L) >60 mL/min/1.73m2 Final             Passed - Visit with PCP or prescribing provider visit in last 6 months or next 3 months     Last office visit with prescriber/PCP: 3/11/2021 OR same dept: 3/11/2021 Neelima Patel MD OR same specialty: 3/11/2021 Neelima Patel MD Last physical: Visit date not found Last MTM visit: Visit date not found         Next appt within 3 mo: Visit date not found  Next physical within 3 mo: Visit date not found  Prescriber OR PCP: Neelima Patel MD ([place])  Last diagnosis associated with med order: 1. Hyperlipidemia, unspecified hyperlipidemia type  - simvastatin (ZOCOR) 20 MG tablet [Pharmacy Med Name: SIMVASTATIN 20 MG TABLET]; TAKE 1 TABLET BY MOUTH EVERYDAY AT BEDTIME  Dispense: 90 tablet; Refill: 3    2. Diabetes (H)  - metFORMIN (GLUCOPHAGE) 500 MG tablet [Pharmacy Med Name: METFORMIN  MG TABLET]; TAKE 2 TABLETS BY MOUTH TWICE A DAY  Dispense: 360 tablet; Refill: 3     If protocol passes may refill for 12 months if within 3 months of last provider visit (or a total of 15 months).           Passed - A1C in last 6 months     Hemoglobin A1c   Date Value Ref Range Status   03/11/2021 7.7 (H) <=5.6 % Final               Passed - Microalbumin in  last year      Microalbumin, Random Urine   Date Value Ref Range Status   03/11/2021 1.51 0.00 - 1.99 mg/dL Final

## 2021-06-18 NOTE — PATIENT INSTRUCTIONS - HE
Patient Instructions by Neelima Patel MD at 11/3/2020  3:20 PM     Author: Neelima Patel MD Service: -- Author Type: Physician    Filed: 11/3/2020  3:48 PM Encounter Date: 11/3/2020 Status: Signed    : Neelima Patel MD (Physician)         Patient Education     Exercise for a Healthier Heart  You may wonder how you can improve the health of your heart. If youre thinking about exercise, youre on the right track. You dont need to become an athlete, but you do need a certain amount of brisk exercise to help strengthen your heart. If you have been diagnosed with a heart condition, your doctor may recommend exercise to help stabilize your condition. To help make exercise a habit, choose safe, fun activities.       Be sure to check with your health care provider before starting an exercise program.    Why exercise?  Exercising regularly offers many healthy rewards. It can help you do all of the following:    Improve your blood cholesterol levels to help prevent further heart trouble    Lower your blood pressure to help prevent a stroke or heart attack    Control diabetes, or reduce your risk of getting this disease    Improve your heart and lung function    Reach and maintain a healthy weight    Make your muscles stronger and more limber so you can stay active    Prevent falls and fractures by slowing the loss of bone mass (osteoporosis)    Manage stress better  Exercise tips  Ease into your routine. Set small goals. Then build on them.  Exercise on most days. Aim for a total of 150 or more minutes of moderate to  vigorous intensity activity each week. Consider 40 minutes, 3 to 4 times a week. For best results, activity should last for 40 minutes on average. It is OK to work up to the 40 minute period over time. Examples of moderate-intensity activity is walking one mile in 15 minutes or 30 to 45 minutes of yard work.  Step up your daily activity level. Along with your exercise program, try  being more active throughout the day. Walk instead of drive. Do more household tasks or yard work.  Choose one or more activities you enjoy. Walking is one of the easiest things you can do. You can also try swimming, riding a bike, or taking an exercise class.  Stop exercising and call your doctor if you:    Have chest pain or feel dizzy or lightheaded    Feel burning, tightness, pressure, or heaviness in your chest, neck, shoulders, back, or arms    Have unusual shortness of breath    Have increased joint or muscle pain    Have palpitations or an irregular heartbeat      1334-3826 [employer]. [address] [place] [address]. All rights reserved. This information is not intended as a substitute for professional medical care. Always follow your healthcare professional's instructions.         Patient Education   Instrumental Activities of Daily Living  Your Health Risk Assessment indicates you have difficulties with instrumental activities of daily living which include laundry, housekeeping, banking, shopping, using the telephone, food preparation, transportation, or taking your own medications. Please make a follow up appointment for us to address this issue in more detail.    [employer] has resources available on the following website: [email or web address]     Also, here is a local agency that provides help with meals and other assistance:   [facility] Line: [phone]     Patient Education   Signs of Hearing Loss  Hearing loss is a problem shared by many people. In fact, it is one of the most common health conditions, particularly as people age. Most people over age 65 have some hearing loss, and by age 80, almost everyone does. Because hearing loss usually occurs slowly over the years, you may not realize your hearing ability has gotten worse.       Have your hearing checked  Contact your [facility] care provider if you:    Have to strain to hear normal  conversation.    Have to watch other peoples faces very carefully to follow what theyre saying.    Need to ask people to repeat what theyve said.    Often misunderstand what people are saying.    Turn the volume of the television or radio up so high that others complain.    Feel that people are mumbling when theyre talking to you.    Find that the effort to hear leaves you feeling tired and irritated.    Notice, when using the phone, that you hear better with 1 ear than the other.    1277-2775 [employer] [address] [place] [address]. All rights reserved. This information is not intended as a substitute for professional medical care. Always follow your healthcare professional's instructions.         Patient Education   Understanding Advance Care Planning  Advance care planning is the process of deciding ones own future medical care. It helps ensure that if you cant speak for yourself, your wishes can still be carried out. The plan is a series of legal documents that note a persons wishes. The documents vary by state. Advance care planning may be done when a person has a serious illness that is expected to get worse. It may be done before major surgery. And it can help you and your family be prepared in case of a major illness or injury. Advance care planning helps with making decisions at these times.       A health care proxy is a person who acts as the voice of a patient when the patient cant speak for himself or herself. The name of this role varies by state. It may be called a Durable Medical Power of  or Durable Power of  for Healthcare. It may be called an agent, surrogate, or advocate. Or it may be called a representative or decision maker. It is an official duty that is identified by a legal document. The document also varies by state.    Why Is Advance Care Planning Important?  If a person communicates their healthcare wishes:    They will be given medical care  that matches their values and goals.    Their family members will not be forced to make decisions in a crisis with no guidance.  Creating a Plan  Making an advance care plan is often done in 3 steps:    Thinking about ones wishes. To create an advance care plan, you should think about what kind of medical treatment you would want if you lose the ability to communicate. Are there any situations in which you would refuse or stop treatment? Are there therapies you would want or not want? And whom do you want to make decisions for you? There are many places to learn more about how to plan for your care. Ask your doctor or  for resources.    Picking a health care proxy. This means choosing a trusted person to speak for you only when you cant speak for yourself. When you cannot make medical decisions, your proxy makes sure the instructions in your advance care plan are followed. A proxy does not make decisions based on his or her own opinions. They must put aside those opinions and values if needed, and carry out your wishes.    Filling out the legal documents. There are several kinds of legal documents for advance care planning. Each one tells health care providers your wishes. The documents may vary by state. They must be signed and may need to be witnessed or notarized. You can cancel or change them whenever you wish. Depending on your state, the documents may include a Healthcare Proxy form, Living Will, Durable Medical Power of , Advance Directive, or others.  The Familys Role  The best help a family can give is to support their loved ones wishes. Open and honest communication is vital. Family should express any concerns they have about the patients choices while the patient can still make decisions.    6252-8812 [employer]. [address] [place] [address], [place] [address]. All rights reserved. This information is not intended as a substitute for professional medical care. Always follow  your healthcare professional's instructions.         Also, [facility] offers a free, downloadable health care directive that allows you to share your treatment choices and personal preferences if you cannot communicate your wishes. It also allows you to appoint another person (called a health care agent) to make health care decisions if you are unable to do so. You can download an advance directive by going here: [email or web address]     Patient Education   Personalized Prevention Plan  You are due for the preventive services outlined below.  Your care team is available to assist you in scheduling these services.  If you have already completed any of these items, please share that information with your care team to update in your medical record.  Health Maintenance   Topic Date Due   ? HEPATITIS B VACCINES (1 of 3 - Risk 3-dose series) 12/20/1954   ? ZOSTER VACCINES (1 of 2) 12/20/1986   ? DXA SCAN  09/07/2018   ? DIABETIC EYE EXAM  08/12/2020   ? DIABETIC FOOT EXAM  11/01/2020   ? A1C  01/09/2021   ? MICROALBUMIN  02/28/2021   ? BMP  07/09/2021   ? LIPID  07/09/2021   ? ADVANCE CARE PLANNING  09/02/2021   ? MEDICARE ANNUAL WELLNESS VISIT  11/03/2021   ? FALL RISK ASSESSMENT  11/03/2021   ? TD 18+ HE  02/06/2028   ? Pneumococcal Vaccine: 65+ Years  Completed   ? INFLUENZA VACCINE RULE BASED  Completed   ? Pneumococcal Vaccine: Pediatrics (0 to 5 Years) and At-Risk Patients (6 to 64 Years)  Aged Out

## 2021-06-19 NOTE — LETTER
Letter by Neelima Patel MD at      Author: Neelima Patel MD Service: -- Author Type: --    Filed:  Encounter Date: 11/4/2019 Status: Signed         Myron Meng  [address]  [facility] [address]             November 4, 2019         Dear Ms. Meng,    Below are the results from your recent visit:    Resulted Orders   Basic Metabolic Panel   Result Value Ref Range    Sodium 141 136 - 145 mmol/L    Potassium 4.3 3.5 - 5.0 mmol/L    Chloride 105 98 - 107 mmol/L    CO2 27 22 - 31 mmol/L    Anion Gap, Calculation 9 5 - 18 mmol/L    Glucose 135 (H) 70 - 125 mg/dL    Calcium 9.7 8.5 - 10.5 mg/dL    BUN 12 8 - 28 mg/dL    Creatinine 0.94 0.60 - 1.10 mg/dL    GFR MDRD Af Amer >60 >60 mL/min/1.73m2    GFR MDRD Non Af Amer 57 (L) >60 mL/min/1.73m2    Narrative    Fasting Glucose reference range is 70-99 mg/dL per  American Diabetes Association (ADA) guidelines.   Vitamin D, Total (25-Hydroxy)   Result Value Ref Range    Vitamin D, Total (25-Hydroxy) 59.3 30.0 - 80.0 ng/mL    Narrative    Deficiency <10.0 ng/mL  Insufficiency 10.0-29.9 ng/mL  Sufficiency 30.0-80.0 ng/mL  Toxicity (possible) >100.0 ng/mL   Lipid Cascade   Result Value Ref Range    Cholesterol 129 <=199 mg/dL    Triglycerides 276 (H) <=149 mg/dL    HDL Cholesterol 38 (L) >=50 mg/dL    LDL Calculated 36 <=129 mg/dL    Patient Fasting > 8hrs? No    Glycosylated Hemoglobin A1c   Result Value Ref Range    Hemoglobin A1c 7.7 (H) 3.5 - 6.0 %       These are very nice results.  See you in March.  Have a good new year.    Please call with questions or contact us using [employer].    Sincerely,        Electronically signed by Neelima Patel MD (Betsy)

## 2021-06-19 NOTE — LETTER
Letter by Neelima Patel MD at      Author: Neelima Patel MD Service: -- Author Type: --    Filed:  Encounter Date: 4/11/2019 Status: (Other)         Myron Meng  [address] [facility] [address]             April 11, 2019         Dear Ms. Meng,    Below are the results from your recent visit:    Resulted Orders   Glycosylated Hemoglobin A1c   Result Value Ref Range    Hemoglobin A1c 8.0 (H) 3.5 - 6.0 %   Microalbumin, Random Urine   Result Value Ref Range    Microalbumin, Random Urine 1.02 0.00 - 1.99 mg/dL    Creatinine, Urine 112.8 mg/dL    Microalbumin/Creatinine Ratio Random Urine 9.0 <=19.9 mg/g    Narrative    Microalbumin, Random Urine  <2.0 mg/dL . . . . . . . . Normal  3.0-30.0 mg/dL . . . . . . Microalbuminuria  >30.0 mg/dL . . . . . .  . Clinical Proteinuria  Microalbumin/Creatinine Ratio, Random Urine  <20 mg/g . . . . .. . . . Normal   mg/g . . . . . . . Microalbuminuria  >300 mg/g . . . . . . . . Clinical Proteinuria   Lipid Cascade   Result Value Ref Range    Cholesterol 134 <=199 mg/dL    Triglycerides 264 (H) <=149 mg/dL    HDL Cholesterol 34 (L) >=50 mg/dL    LDL Calculated 47 <=129 mg/dL    Patient Fasting > 8hrs? No    Basic Metabolic Panel   Result Value Ref Range    Sodium 139 136 - 145 mmol/L    Potassium 4.1 3.5 - 5.0 mmol/L    Chloride 105 98 - 107 mmol/L    CO2 26 22 - 31 mmol/L    Anion Gap, Calculation 8 5 - 18 mmol/L    Glucose 179 (H) 70 - 125 mg/dL    Calcium 9.4 8.5 - 10.5 mg/dL    BUN 12 8 - 28 mg/dL    Creatinine 1.05 0.60 - 1.10 mg/dL    GFR MDRD Af Amer >60 >60 mL/min/1.73m2    GFR MDRD Non Af Amer 50 (L) >60 mL/min/1.73m2    Narrative    Fasting Glucose reference range is 70-99 mg/dL per  American Diabetes Association (ADA) guidelines.   HM2(CBC w/o Differential)   Result Value Ref Range    WBC 5.9 4.0 - 11.0 thou/uL    RBC 4.28 3.80 - 5.40 mill/uL    Hemoglobin 9.1 (L) 12.0 - 16.0 g/dL    Hematocrit 30.0 (L) 35.0 - 47.0 %    MCV 70 (L) 80 - 100  fL    MCH 21.2 (L) 27.0 - 34.0 pg    MCHC 30.3 (L) 32.0 - 36.0 g/dL    RDW 18.1 (H) 11.0 - 14.5 %    Platelets 244 140 - 440 thou/uL    MPV 8.1 7.0 - 10.0 fL       Alvin Thomas and family,  Unfortunately, your A1c is elevated again.  It is not horribly high, but I would like to see it under 8%.  Do you want to adjust medication or would you prefer to improve your diet?  Using less sugar will definitely go far.  Make sure you take all your medication over the next 3 months so that we get an accurate reading at recheck.  Your hemoglobin also is low and is likely because of your vegetarian diet.  My suggestion is to eat high iron foods such as spinach.  If you could eat spinach every day, I think it would make a huge improvement.  This is a good way to do it and not bother your stomach with an iron supplement.  Your other labs look great.  I would like to see you in 3 months for recheck.    Please call with questions or contact us using [employer].    Sincerely,        Electronically signed by Neelima Patel MD (Betsy)

## 2021-06-19 NOTE — LETTER
Letter by Neelima Patel MD at      Author: Neelima Patel MD Service: -- Author Type: --    Filed:  Encounter Date: 6/16/2019 Status: (Other)         Myron Meng  [address] [facility] [address]             June 16, 2019         Dear Ms. Meng,    Below are the results from your recent visit:    Resulted Orders   Glycosylated Hemoglobin A1c   Result Value Ref Range    Hemoglobin A1c 7.5 (H) 3.5 - 6.0 %   HM2(CBC w/o Differential)   Result Value Ref Range    WBC 6.2 4.0 - 11.0 thou/uL    RBC 4.48 3.80 - 5.40 mill/uL    Hemoglobin 9.3 (L) 12.0 - 16.0 g/dL    Hematocrit 31.7 (L) 35.0 - 47.0 %    MCV 71 (L) 80 - 100 fL    MCH 20.8 (L) 27.0 - 34.0 pg    MCHC 29.3 (L) 32.0 - 36.0 g/dL    RDW 20.7 (H) 11.0 - 14.5 %    Platelets 218 140 - 440 thou/uL    MPV 9.6 8.5 - 12.5 fL       Your A1c looks good.  Improved from last check.  Your blood counts are stable, although your hemoglobin is still low.  It would help to supplement with high iron foods like fresh spinach and other leafy greens etc.  Search the Internet for other high iron foods that are vegetarian.    Please call with questions or contact us using [employer]    Sincerely,        Electronically signed by Neelima Patel MD (Betsy)

## 2021-06-19 NOTE — PROGRESS NOTES
SUBJECTIVE: Myron Meng is a 82 y.o. Patient Declined female who presents today who comes in for her diabetes check with her son-in-law.  Apparently her son  unexpectedly.  He was a respiratory therapist.  This was quite devastating.  She has been in [place].  He relates that when she was in [place] she saw a doctor there who put her on a new reflux medication because her reflux was out of control.  She has had this problem long-term.  He brings me a sample of the medication and it is AcipHex 20 mg and domperidone SR 30 mg.  It is a combination medication.  I tried to look up the second medication but could not find it in my pharmacopeia.  They would like me to write a prescription for these medications so that they can carry a years worth of medication from [place] to the [place].  I tell her it is no problem to do this for the AcipHex and I cannot promise to do the other because I do not know what it is.  As for her diabetes her last A1c was quite good at 6.4%.  She also has coronary artery disease.  Her blood pressure is well controlled today.  Her LDL at last check back in February was 60.  She has osteoporosis and was started on high-dose vitamin D on a weekly basis due to the deficiency. She is not checking her blood sugars at this time.  She needs a foot exam and an eye exam and they promised to schedule the eye exam.  They have a concern for her hearing as apparently it has worsened.  They asked me for a referral for audiology.    OBJECTIVE: /64 (Patient Site: Right Arm, Patient Position: Sitting, Cuff Size: Adult Regular)  Pulse 64  Temp 98.4  F (36.9  C) (Oral)   Wt 105 lb 6.4 oz (47.8 kg)  BMI 20.25 kg/m2  General: Thin octogenarian in no acute distress, she lets her son-in-law speak for her  Heart: Regular rate and rhythm without murmur  Lungs: Clear bilaterally  Abdomen: Soft, nontender  Extremities: Warm, dry and without edema  Foot exam shows good nails and skin, no deformity, and  no neuropathy on filament testing    ASSESSMENT & PLAN:    1. Type 2 diabetes mellitus (H)  Glycosylated Hemoglobin A1c   2. Coronary atherosclerosis due to lipid rich plaque     3. Benign Essential Hypertension  Basic Metabolic Panel   4. Hyperlipidemia, unspecified hyperlipidemia type  Lipid Cascade   5. Gastroesophageal reflux disease without esophagitis  RABEprazole (ACIPHEX) 20 mg tablet   6. Hard of hearing  Ambulatory referral to Audiology   7. Osteoporosis  Vitamin D, Total (25-Hydroxy)     I have printed out a years worth of AcipHex 20 mg prescription.  They can carry this with them next time they go to [facility].  I will get a hold of the clinical pharmacist to discuss the other medication and to make a decision about it.  She was given a referral to the audiologist.  I will check the above-mentioned lab work and get back to them by mail and only call with grossly abnormal values.  She should see me back in 4 months time.    Addendum: On a staff message to Merissa I asked her about the domperidone.  She tells me in the [place] it is only available as a FDA investigational drug use so she recommended that I not write a prescription for it.  She tells me it is widely available elsewhere in the world.  She notes that the closest drug to this medication is metoclopromide (Reglan).  She says it has a QT interval risk as well as a risk for tardive dyskinesia if used long-term.  I have written a note for the patient to be called and to offer them a note for the okay to bring it back, but not the prescription.    Patient Active Problem List   Diagnosis     Acute Subdural Hematoma     Helicobacter Pylori (H. Pylori) Infection     Lower Back Pain     Chronic Reflux Esophagitis     Type 2 diabetes mellitus (H)     Benign Essential Hypertension     Coronary Artery Disease     Carcinoma Of The Large Intestine     Hyperlipidemia     Esophageal Varices     Cirrhosis     Cholelithiasis     GERD (gastroesophageal reflux disease)      Hiatal hernia     Anxiety     Osteoporosis       Current Outpatient Prescriptions on File Prior to Visit   Medication Sig Dispense Refill     lisinopril (PRINIVIL,ZESTRIL) 30 MG tablet TAKE 1 TABLET (30 MG TOTAL) BY MOUTH DAILY. 90 tablet 1     metFORMIN (GLUCOPHAGE) 500 MG tablet TAKE 2 TABLETS (1,000 MG TOTAL) BY MOUTH 2 (TWO) TIMES A DAY. 360 tablet 1     metoprolol succinate (TOPROL-XL) 100 MG 24 hr tablet TAKE 1 TABLET (100 MG TOTAL) BY MOUTH DAILY. 90 tablet 1     nitroglycerin (NITROSTAT) 0.4 MG SL tablet Place 0.4 mg under the tongue every 5 (five) minutes as needed for chest pain. For up to 3 doses. Call 911 if persists.       simvastatin (ZOCOR) 20 MG tablet TAKE 1 TABLET (20 MG TOTAL) BY MOUTH BEDTIME. 90 tablet 1     No current facility-administered medications on file prior to visit.

## 2021-06-20 NOTE — PROGRESS NOTES
DATE OF SERVICE: 09/05/2018    SUBJECTIVE:  An 82-year-old female presents today with an  to discuss  left shoulder pain.  She has pain in her left shoulder for the past several weeks.   It is difficult to get a good history but from what I can tell, it has been there  for at least 2 to 3 weeks.  It hurts to move it and she has had difficulty sleeping  on the shoulder and the pain is moderate and is described as shooting.      REVIEW OF SYSTEMS:  Negative for fever.  Negative for vomiting or diarrhea.      Socially the patient does not smoke.    OBJECTIVE:  VITAL SIGNS:  Blood pressure 110/60, pulse 71, respirations 16, temperature 98.6.  HEENT:  Grossly is normal with TMs clear, sclerae not injected, pharynx  nonerythematous.  NECK:  Supple.    MUSCULOSKELETAL:  Examination of the left shoulder shows pain to palpation of the  rotator cuff.  There is pain to resistance of the rotator cuff.  Decreased range of  flexion and extension.    ASSESSMENT:  Probable rotator cuff strain.    PLAN:  1.  Referral to orthopedics.  2.  Return if not improving.  3.  Celebrex for pain.  4.  Patient for some reason did not get all of her medications at her last visit, so  I guardedly refilled medications and they will follow up with Dr. Patel.      MD avery FONTENOT 09/05/2018 16:24:06  T 09/05/2018 16:42:26  R 09/05/2018 16:42:26  [id number]        cc:NOEMI CLARK MD

## 2021-06-20 NOTE — PROGRESS NOTES
Chief Complaint   Patient presents with     Cough     Pt son reports that patient has been experiencing a cough since yesterday as well as having shortness of breath.      HPI: Patient presents today with family member who interprets for her.  The patient does understand some English and provide some answers periodically through the exam.  They report that the patient has had a cough for the past couple of days that has been dry in nature and nonproductive.  The son-in-law feels as though she looks short of breath, however the patient denies any actual feelings of shortness of breath.  She does admit that her fluid intake has been suboptimal and that she needs to drink more water.  She does have a history of reflux, but since starting a medication that they got from [facility] (name unknown) her symptoms are about 80% better.  She denies any known sick contacts, fever, recent immobility, history of blood clots, chest pain.  She does have some clear drainage out of her sinuses.  She denies any personal history of seasonal allergies.  She has been taking cough medicine and throat lozenges with minimal relief.     ROS:Review of Systems - History obtained from the patient  General ROS: positive for  - fatigue  Ophthalmic ROS: negative  ENT ROS: positive for - nasal discharge  Allergy and Immunology ROS: negative  Respiratory ROS: positive for - cough  Cardiovascular ROS: negative  Gastrointestinal ROS: negative  Neurological ROS: negative    SH: The Patient's  reports that she has never smoked. She has never used smokeless tobacco.      FH: The Patient's Family history is unknown by patient.     Meds:    Current Outpatient Prescriptions on File Prior to Visit   Medication Sig Dispense Refill     celecoxib (CELEBREX) 200 MG capsule Take 1 capsule (200 mg total) by mouth daily. 14 capsule 0     lisinopril (PRINIVIL,ZESTRIL) 30 MG tablet TAKE 1 TABLET (30 MG TOTAL) BY MOUTH DAILY. 90 tablet 1     metFORMIN (GLUCOPHAGE) 500 MG  tablet TAKE 2 TABLETS (1,000 MG TOTAL) BY MOUTH 2 (TWO) TIMES A DAY. 360 tablet 1     metoprolol succinate (TOPROL-XL) 100 MG 24 hr tablet TAKE 1 TABLET (100 MG TOTAL) BY MOUTH DAILY. 90 tablet 1     nitroglycerin (NITROSTAT) 0.4 MG SL tablet Place 0.4 mg under the tongue every 5 (five) minutes as needed for chest pain. For up to 3 doses. Call 911 if persists.       RABEprazole (ACIPHEX) 20 mg tablet Take 1 tablet (20 mg total) by mouth daily. 90 tablet 3     simvastatin (ZOCOR) 20 MG tablet TAKE 1 TABLET (20 MG TOTAL) BY MOUTH BEDTIME. 90 tablet 1     No current facility-administered medications on file prior to visit.        O:  /70 (Patient Site: Left Arm, Patient Position: Sitting, Cuff Size: Adult Regular)  Pulse (!) 112  Temp 98.6  F (37  C)  SpO2 98%    Physical Examination:   General appearance - alert, well appearing, and in no distress  Mental status - alert, oriented to person, place, and time  Eyes - pupils equal and reactive, extraocular eye movements intact  Ears - bilateral TM's and external ear canals normal  Nose - normal and patent, no erythema, discharge or polyps  Mouth - mucous membranes moist, pharynx normal without lesions  Neck - supple, no significant adenopathy  Lymphatics - no palpable lymphadenopathy, no hepatosplenomegaly  Chest - clear to auscultation, no wheezes, rales or rhonchi, symmetric air entry  Heart -tachycardic.  Regular rhythm, S1 and S2 normal, no murmurs noted  Abdomen - soft, nontender, nondistended, no masses or organomegaly  Neurological - alert, oriented, normal speech, no focal findings or movement disorder noted, neck supple without rigidity, cranial nerves II through XII intact, motor and sensory grossly normal bilaterally, normal muscle tone, no tremors, strength 5/5  Musculoskeletal - no joint tenderness, deformity or swelling  Extremities - peripheral pulses normal, no pedal edema, no clubbing or cyanosis  Skin - normal coloration and turgor, no rashes,  no suspicious skin lesions noted      A/P:     Problem List Items Addressed This Visit     None      Visit Diagnoses     Cough    -  Primary    Impacted cerumen of right ear        Fatigue        Relevant Orders    HM1 (CBC and Differential) (Completed)    HM1 (CBC with Diff) (Completed)            1. Cough  Discussed that this is most likely viral in nature.  Thought less likely to be reflux as her heartburn symptoms are considerably improved with her new medication.  Pulmonary embolism thought to be less likely as she has no shortness of breath or chest pain.  Prescription for Tessalon Perles sent to the pharmacy.  She is tachycardic in clinic, but does admit to not drinking fluids.  Recommended that she increase her fluid intake. She will let us know if her symptoms fail to improve as anticipated.    2. Impacted cerumen of right ear  A large amount of cerumen was removed following irrigation to loosen it and alligator forceps.  Tympanic membrane intact post procedure.    3. Fatigue  Patient has been anemic before in the past.  Recheck CBC to see if worsening anemia is contributing to fatigue versus illness.  - HM1 (CBC and Differential)  - HM1 (CBC with Diff)        Giorgio Dodson, AVTAR    This transcription was created utilizing voice recognition software and may contain typographical errors.

## 2021-06-20 NOTE — LETTER
Letter by Neelima Patel MD at      Author: Neelima Patel MD Service: -- Author Type: --    Filed:  Encounter Date: 2/29/2020 Status: (Other)         Myron Meng  [address] [facility] [address]         February 29, 2020       Dear Ms. Meng,    Below are the results from your recent visit:    Resulted Orders   Glycosylated Hemoglobin A1c   Result Value Ref Range    Hemoglobin A1c 7.5 (H) 3.5 - 6.0 %   Microalbumin, Random Urine   Result Value Ref Range    Microalbumin, Random Urine 1.77 0.00 - 1.99 mg/dL    Creatinine, Urine 128.9 mg/dL    Microalbumin/Creatinine Ratio Random Urine 13.7 <=19.9 mg/g    Narrative    Microalbumin, Random Urine  <2.0 mg/dL . . . . . . . . Normal  3.0-30.0 mg/dL . . . . . . Microalbuminuria  >30.0 mg/dL . . . . . .  . Clinical Proteinuria    Microalbumin/Creatinine Ratio, Random Urine  <20 mg/g . . . . .. . . . Normal   mg/g . . . . . . . Microalbuminuria  >300 mg/g . . . . . . . . Clinical Proteinuria           These are very nice results.  See you in 4 months.  Have a happy Spring.    Please call with questions or contact us using [employer].    Sincerely,        Electronically signed by Neelima Patel MD (Betsy)

## 2021-06-20 NOTE — LETTER
Letter by Neelima Patel MD at      Author: Neelima Patel MD Service: -- Author Type: --    Filed:  Encounter Date: 7/11/2020 Status: (Other)         Myron Meng  [address] [facility] [address]             July 11, 2020         Dear Ms. Meng,    Below are the results from your recent visit:    Resulted Orders   BNP(B-type Natriuretic Peptide)   Result Value Ref Range     0 - 167 pg/mL   Glycosylated Hemoglobin A1c   Result Value Ref Range    Hemoglobin A1c 7.6 (H) 3.5 - 6.0 %   Comprehensive Metabolic Panel   Result Value Ref Range    Sodium 140 136 - 145 mmol/L    Potassium 4.4 3.5 - 5.0 mmol/L    Chloride 104 98 - 107 mmol/L    CO2 24 22 - 31 mmol/L    Anion Gap, Calculation 12 5 - 18 mmol/L    Glucose 169 (H) 70 - 125 mg/dL    BUN 13 8 - 28 mg/dL    Creatinine 1.07 0.60 - 1.10 mg/dL    GFR MDRD Af Amer 59 (L) >60 mL/min/1.73m2    GFR MDRD Non Af Amer 49 (L) >60 mL/min/1.73m2    Bilirubin, Total 0.4 0.0 - 1.0 mg/dL    Calcium 9.5 8.5 - 10.5 mg/dL    Protein, Total 8.0 6.0 - 8.0 g/dL    Albumin 3.4 (L) 3.5 - 5.0 g/dL    Alkaline Phosphatase 66 45 - 120 U/L    AST 34 0 - 40 U/L    ALT 25 0 - 45 U/L    Narrative    Fasting Glucose reference range is 70-99 mg/dL per  American Diabetes Association (ADA) guidelines.   Lipid [place] FASTING   Result Value Ref Range    Cholesterol 133 <=199 mg/dL    Triglycerides 153 (H) <=149 mg/dL    HDL Cholesterol 40 (L) >=50 mg/dL    LDL Calculated 62 <=129 mg/dL    Patient Fasting > 8hrs? No        Hello Myron and family,    These are nice lab results. See you in 4 months.    Please call with questions or contact us using [employer].    Sincerely,        Electronically signed by Neelima Patel MD (Betsy)

## 2021-06-21 NOTE — PROGRESS NOTES
SUBJECTIVE: Myron Meng is a 82 y.o. Patient Declined female who presents today with her son for a complaint of dizziness that started this morning.  She describes it as a vertigo that lasts 5-7 minutes at a time.  It seems to be somewhat positional.  She is also had a very painful left ear.  She is experiencing shooting pain in her ear and she feels tender just in front of the ear canal.  She denies any fever she otherwise feels well.  She does not have runny nose or other cold symptoms.   She is also due for a diabetes check so we decided to do that today.  Her blood pressure is elevated at first check and this is likely because of her above-mentioned complaint.  Her last A1c was great at 7.4%.  She also has a history of coronary artery disease and they would like a refill of the nitroglycerin.  She really does not use it but they do not have any tablets at home.  Her last LDL was very good at 65 and was done at her last diabetes check in August.  That does not need to be rechecked.    OBJECTIVE: /76 (Patient Site: Right Arm, Patient Position: Sitting, Cuff Size: Adult Regular)   Pulse 68   Temp 98.1  F (36.7  C) (Oral)   Wt 107 lb 6.4 oz (48.7 kg)   SpO2 97%   Breastfeeding? No   BMI 20.63 kg/m    General: Healthy-appearing normal weight octogenarian female in no acute distress  HEENT: Eyes clear, nose without rhinorrhea, ears are clear bilaterally although there may be some measure of fluid in contraction of the TM on the left.  There is no redness noted in the TM, canal, or pre-auricle skin.  There is nothing palpable at this area either but it is tender.  Heart: Regular rate and rhythm without murmur  Lungs: Clear bilaterally  Abdomen: Soft, nontender  Extremities: Warm, dry and without edema      ASSESSMENT & PLAN:    1. Left ear pain  Erythrocyte Sedimentation Rate    HM1(CBC and Differential)    HM1 (CBC with Diff)    methylPREDNISolone (MEDROL DOSEPACK) 4 mg tablet   2. Dizzy spells   methylPREDNISolone (MEDROL DOSEPACK) 4 mg tablet   3. Type 2 diabetes mellitus without complication, without long-term current use of insulin (H)  Glycosylated Hemoglobin A1c   4. Coronary atherosclerosis due to lipid rich plaque  nitroglycerin (NITROSTAT) 0.4 MG SL tablet   5. Benign Essential Hypertension  Comprehensive Metabolic Panel   6. Hyperlipidemia, unspecified hyperlipidemia type       We discussed that it is possible since there does not look like any infection that this could be viral infection.  I also want to make sure that it is not temporal arteritis.  It also could be something like trigeminal neuralgia.  But since she is having the dizzy spells along with it I think it could be viral induced.  I am going to treat it with a Medrol Dosepak.  I have refilled her nitroglycerin.  I will check the above-mentioned lab work and get back to them by mail and only call with grossly abnormal values.  I will refill her other meds as needed.  She can see me back in 3-4 months for recheck.    Patient Active Problem List   Diagnosis     Acute Subdural Hematoma     Helicobacter Pylori (H. Pylori) Infection     Lower Back Pain     Chronic Reflux Esophagitis     Type 2 diabetes mellitus (H)     Benign Essential Hypertension     Coronary Artery Disease     Carcinoma Of The Large Intestine     Hyperlipidemia     Esophageal Varices     Cirrhosis     Cholelithiasis     GERD (gastroesophageal reflux disease)     Hiatal hernia     Anxiety     Osteoporosis       Current Outpatient Medications on File Prior to Visit   Medication Sig Dispense Refill     benzonatate (TESSALON PERLES) 100 MG capsule Take 1 capsule (100 mg total) by mouth every 6 (six) hours as needed for cough. 30 capsule 0     lisinopril (PRINIVIL,ZESTRIL) 30 MG tablet TAKE 1 TABLET (30 MG TOTAL) BY MOUTH DAILY. 90 tablet 1     metFORMIN (GLUCOPHAGE) 500 MG tablet TAKE 2 TABLETS (1,000 MG TOTAL) BY MOUTH 2 (TWO) TIMES A DAY. 360 tablet 1     metoprolol  succinate (TOPROL-XL) 100 MG 24 hr tablet TAKE 1 TABLET (100 MG TOTAL) BY MOUTH DAILY. 90 tablet 1     RABEprazole (ACIPHEX) 20 mg tablet Take 1 tablet (20 mg total) by mouth daily. 90 tablet 3     simvastatin (ZOCOR) 20 MG tablet TAKE 1 TABLET (20 MG TOTAL) BY MOUTH BEDTIME. 90 tablet 1     [DISCONTINUED] nitroglycerin (NITROSTAT) 0.4 MG SL tablet Place 0.4 mg under the tongue every 5 (five) minutes as needed for chest pain. For up to 3 doses. Call 911 if persists.       celecoxib (CELEBREX) 200 MG capsule Take 1 capsule (200 mg total) by mouth daily. 14 capsule 0     No current facility-administered medications on file prior to visit.

## 2021-06-21 NOTE — LETTER
Letter by Neelima Patel MD at      Author: Neelima Patel MD Service: -- Author Type: --    Filed:  Encounter Date: 3/13/2021 Status: (Other)         Myron Meng  [address] [facility] [address] [place] [facility] [address]             March 13, 2021         Dear Ms. Meng,    Below are the results from your recent visit:    Resulted Orders   Glycosylated Hemoglobin A1c   Result Value Ref Range    Hemoglobin A1c 7.7 (H) <=5.6 %   Microalbumin, Random Urine   Result Value Ref Range    Microalbumin, Random Urine 1.51 0.00 - 1.99 mg/dL    Creatinine, Urine 109.8 mg/dL    Microalbumin/Creatinine Ratio Random Urine 13.8 <=19.9 mg/g    Narrative    Microalbumin, Random Urine  <2.0 mg/dL . . . . . . . . Normal  3.0-30.0 mg/dL . . . . . . Microalbuminuria  >30.0 mg/dL . . . . . .  . Clinical Proteinuria    Microalbumin/Creatinine Ratio, Random Urine  <20 mg/g . . . . .. . . . Normal   mg/g . . . . . . . Microalbuminuria  >300 mg/g . . . . . . . . Clinical Proteinuria       Lipid [place] RANDOM   Result Value Ref Range    Cholesterol 141 <=199 mg/dL    Triglycerides 227 (H) <=149 mg/dL    HDL Cholesterol 42 (L) >=50 mg/dL    LDL Calculated 54 <=129 mg/dL    Patient Fasting > 8hrs? No    Basic Metabolic Panel   Result Value Ref Range    Sodium 139 136 - 145 mmol/L    Potassium 4.1 3.5 - 5.0 mmol/L    Chloride 102 98 - 107 mmol/L    CO2 25 22 - 31 mmol/L    Anion Gap, Calculation 12 5 - 18 mmol/L    Glucose 230 (H) 70 - 125 mg/dL    Calcium 9.0 8.5 - 10.5 mg/dL    BUN 15 8 - 28 mg/dL    Creatinine 1.17 (H) 0.60 - 1.10 mg/dL    GFR MDRD Af Amer 53 (L) >60 mL/min/1.73m2    GFR MDRD Non Af Amer 44 (L) >60 mL/min/1.73m2    Narrative    Fasting Glucose reference range is 70-99 mg/dL per  American Diabetes Association (ADA) guidelines.   HM2(CBC w/o Differential)   Result Value Ref Range    WBC 5.4 4.0 - 11.0 thou/uL    RBC 4.38 3.80 - 5.40 mill/uL    Hemoglobin 11.3 (L) 12.0 - 16.0 g/dL    Hematocrit 35.3 35.0 - 47.0 %    MCV  81 80 - 100 fL    MCH 25.8 (L) 27.0 - 34.0 pg    MCHC 32.0 32.0 - 36.0 g/dL    RDW 16.7 (H) 11.0 - 14.5 %    Platelets 194 140 - 440 thou/uL    MPV 10.3 (H) 7.0 - 10.0 fL       Alvin Sanches and family,    The diabetes labs look great.  The hemoglobin has improved from a previous 9.7.  Kidney function is a bit low but nothing to worry about.  It might be be due to using pain medicine perhaps, or even a bit of dehydration.  Hope we can get the stomach pain under control.  See you in 4 months.    Please call with questions or contact us using [employer].    Sincerely,        Electronically signed by Neelima Patel MD (Betsy)

## 2021-06-21 NOTE — LETTER
Letter by Neelima Patel MD at      Author: Neelima Patel MD Service: -- Author Type: --    Filed:  Encounter Date: 11/4/2020 Status: (Other)         Myorn Meng  [address] [facility] [address]       November 4, 2020     Dear Ms. Meng,    Below are the results from your recent visit:    Resulted Orders   Glycosylated Hemoglobin A1c   Result Value Ref Range    Hemoglobin A1c 7.8 (H) <=5.6 %      Comment:      Normal <5.7% Prediabete 5.7-6.4% Diabletes 6.5% or higher - adopted from ADA consensus guidelines   Basic Metabolic Panel   Result Value Ref Range    Sodium 140 136 - 145 mmol/L    Potassium 3.8 3.5 - 5.0 mmol/L    Chloride 105 98 - 107 mmol/L    CO2 25 22 - 31 mmol/L    Anion Gap, Calculation 10 5 - 18 mmol/L    Glucose 226 (H) 70 - 125 mg/dL    Calcium 8.8 8.5 - 10.5 mg/dL    BUN 13 8 - 28 mg/dL    Creatinine 1.02 0.60 - 1.10 mg/dL    GFR MDRD Af Amer >60 >60 mL/min/1.73m2    GFR MDRD Non Af Amer 52 (L) >60 mL/min/1.73m2    Narrative    Fasting Glucose reference range is 70-99 mg/dL per  American Diabetes Association (ADA) guidelines.       The A1c is creeping up. Watch your diet--sugar and carbohydrate intake. Other labs good.  See you in Spring.  Happy Thanksgiving.    Please call with questions or contact us using [employer].    Sincerely,        Electronically signed by Neelima Patel MD (Betsy)

## 2021-06-22 ENCOUNTER — RECORDS - HEALTHEAST (OUTPATIENT)
Dept: FAMILY MEDICINE | Facility: CLINIC | Age: 86
End: 2021-06-22

## 2021-06-22 NOTE — PROGRESS NOTES
"Audiology only; referred by Giorgio Dodson/Neelima Patel    History:  Ms. Meng presented in the company of her son-in-law, who acted as . He indicated she has known, bilateral hearing loss of some duration (at least ten years). She had been fit with at least one hearing aid while still living in [facility], but this was not well tolerated by her, and was described as \"too noisy\". She experienced possible positional vertigo when supine and in bed 11-20-18; to date, this was an isolated incident and has not repeated.  She denied tinnitus, current otalgia, history of ear infection or ear surgery, otorrhea, or recent illness.    Results:  Otoscopy revealed clear canals, bilaterally, with an opaque right tympanic membrane.  Hearing sensitivity was assessed with good reliability using both insert and circumaural phones.      Right ear:   Mild, sloping to profound sensorineural hearing loss for 250-8000 Hz.   Left ear:  Moderate, sloping to profound sensorineural hearing loss for 250-8000 Hz.    Speech reception thresholds were attempted using the son-in-law as an , without success. Word recognition ability (using audiologist's monitored live voice reading English word list) was poor, bilaterally, with presentation levels significantly above typical conversational volume in both ears (100 dBHL). No responses were recordable at 95 dBHL in either ear.    Tympanometry was consistent with normal middle ear function, bilaterally.    Recommendations:  Follow-up with PCP; explained to Ms. Meng's son-in-law that her word recognition ability was poor in each ear, and traditional hearing aids would not likely benefit her with current test results. This may also have been why the initial hearing aid(s) were not well received by her. However, word recognition ability might improve if a professional, Ivonne-speaking  and Ivonne word list were utilized in testing; the son-in-law declined this. Ms. Meng did " well using a [employer] personal amplifier in the office today; however, both Ms. Meng and her son-in-law did not like its appearance, and initially did not want to pursue this option. Information on where to purchase and try such devices ([employer]; [place]) was provided to the son-in-law.   If further testing is opted; the request was made of the family to allow [facility] to obtain the services of a professional Ivonne-speaking  for the evaluation. Ms. Meng's son-in-law expressed verbal understanding of this information and plan.    Wm Castrejon., [facility]  [place] Licensed Audiologist [id number]

## 2021-06-22 NOTE — PROGRESS NOTES
Chief Complaint   Patient presents with     Oral Pain     Having pain in the gums for about 10-15 days.     Hearing Problem     Has been having hearing problem for a while now. Would now like hearing aids.      Nasal Congestion       HPI: Patient presents today with her son who is interpreting for her.  She notes that she has been having issues with decreased hearing for many years and has been holding off on getting hearing aids.  At this point she is ready to get a formal audiology evaluation and hearing aids.  She was evaluated by her PCP for ear fullness and pain a couple of weeks ago and given a prescription for methylprednisolone.  Those symptoms have since resolved.  Afebrile.  No ear drainage.  No trauma to the tympanic membranes.    The patient also notes that she has been having issues with gum pain on the top and bottoms for the past 2 weeks.  She wonders if her dentures are fitting well.  No known drainage.  She has not investigated the inside of her mouth.  She has been using topical Orajel which has provided mild relief.    ROS:Review of Systems - History obtained from the patient  General ROS: negative  Ophthalmic ROS: negative  ENT ROS: positive for - oral lesions and decreased hearing  Hematological and Lymphatic ROS: negative  Respiratory ROS: negative  Cardiovascular ROS: negative  Neurological ROS: negative  Dermatological ROS: negative    SH: The Patient's  reports that  has never smoked. she has never used smokeless tobacco.      FH: The Patient's Family history is unknown by patient.     Meds:    Current Outpatient Medications on File Prior to Visit   Medication Sig Dispense Refill     lisinopril (PRINIVIL,ZESTRIL) 30 MG tablet TAKE 1 TABLET (30 MG TOTAL) BY MOUTH DAILY. 90 tablet 1     metFORMIN (GLUCOPHAGE) 500 MG tablet TAKE 2 TABLETS (1,000 MG TOTAL) BY MOUTH 2 (TWO) TIMES A DAY. 360 tablet 1     metoprolol succinate (TOPROL-XL) 100 MG 24 hr tablet TAKE 1 TABLET (100 MG TOTAL) BY MOUTH  "DAILY. 90 tablet 1     simvastatin (ZOCOR) 20 MG tablet TAKE 1 TABLET (20 MG TOTAL) BY MOUTH BEDTIME. 90 tablet 1     benzonatate (TESSALON PERLES) 100 MG capsule Take 1 capsule (100 mg total) by mouth every 6 (six) hours as needed for cough. 30 capsule 0     nitroglycerin (NITROSTAT) 0.4 MG SL tablet Place 1 tablet (0.4 mg total) under the tongue every 5 (five) minutes as needed for chest pain For up to 3 doses. Call 911 if persists. . 5 tablet 0     RABEprazole (ACIPHEX) 20 mg tablet Take 1 tablet (20 mg total) by mouth daily. 90 tablet 3     No current facility-administered medications on file prior to visit.        O:  /64   Pulse 63   Temp 98.2  F (36.8  C) (Oral)   Ht 5' 0.5\" (1.537 m)   Wt 106 lb (48.1 kg)   SpO2 97%   Breastfeeding? No   BMI 20.36 kg/m      Physical Examination:   General appearance - alert, well appearing, and in no distress  Mental status - alert, oriented to person, place, and time  Eyes - pupils equal and reactive, extraocular eye movements intact  Ears - bilateral TM's and external ear canals normal  Nose - normal and patent, no erythema, discharge or polyps  Mouth -there is a small erosion along her lower gumline which appears to be a contact point for dentures.  No redness or discharge noted in this area.  There is a small lump along her upper gumline towards the inside of her mouth.  When palpated, this was tender to the touch.  Palpation also causes a yellow thick discharge.  No significant redness.  Neck - supple, no significant adenopathy  Lymphatics - no palpable lymphadenopathy, no hepatosplenomegaly  Chest - clear to auscultation, no wheezes, rales or rhonchi, symmetric air entry  Heart - normal rate and regular rhythm, S1 and S2 normal, no murmurs noted  Skin - normal coloration and turgor, no rashes, no suspicious skin lesions noted      A/P:     Problem List Items Addressed This Visit     None      Visit Diagnoses     Gum abscess    -  Primary    Relevant " Medications    metroNIDAZOLE (FLAGYL) 250 MG tablet    Other Relevant Orders    Ambulatory referral to Dentistry    Oral pain        Relevant Medications    ibuprofen (ADVIL,MOTRIN) 600 MG tablet    benzocaine (HURRICAINE) 20 % Gel    Bilateral hearing loss, unspecified hearing loss type        Relevant Orders    Ambulatory referral to Audiology            1. Gum abscess  I have concerns about her dentures not fitting properly and what appears to be an abscess that drained on her upper gumline.  Metronidazole sent to the pharmacy.  She was strongly encouraged to follow-up with a dentist ASAP.  The son knows a dentist in the area who will be covered under her insurance and will make that appointment.  - Ambulatory referral to Dentistry  - metroNIDAZOLE (FLAGYL) 250 MG tablet; Take 1 tablet (250 mg total) by mouth 3 (three) times a day for 7 days.  Dispense: 21 tablet; Refill: 0    2. Oral pain  - ibuprofen (ADVIL,MOTRIN) 600 MG tablet; Take 1 tablet (600 mg total) by mouth every 6 (six) hours as needed for pain.  Dispense: 60 tablet; Refill: 0  - benzocaine (HURRICAINE) 20 % Gel; Apply 1 application to teeth 4 (four) times a day as needed.  Dispense: 30 each; Refill: 0    3. Bilateral hearing loss, unspecified hearing loss type  Audiology evaluation for hearing loss and hearing aid recommendations.    - Ambulatory referral to Audiology        Giorgio Dodson CNP

## 2021-06-23 ENCOUNTER — AMBULATORY - HEALTHEAST (OUTPATIENT)
Dept: SURGERY | Facility: CLINIC | Age: 86
End: 2021-06-23

## 2021-06-23 DIAGNOSIS — Z11.59 ENCOUNTER FOR SCREENING FOR OTHER VIRAL DISEASES: ICD-10-CM

## 2021-06-24 ENCOUNTER — COMMUNICATION - HEALTHEAST (OUTPATIENT)
Dept: FAMILY MEDICINE | Facility: CLINIC | Age: 86
End: 2021-06-24

## 2021-06-24 ENCOUNTER — RECORDS - HEALTHEAST (OUTPATIENT)
Dept: ADMINISTRATIVE | Facility: OTHER | Age: 86
End: 2021-06-24

## 2021-06-25 NOTE — TELEPHONE ENCOUNTER
Refill Approved (lisinopril, simvastatin, metoprolol)     Rx renewed per Medication Renewal Policy. Medication was last renewed on     Robby Matos, [facility] Connection Triage/Med Refill 3/17/2019     Requested Prescriptions   Pending Prescriptions Disp Refills     metoprolol succinate (TOPROL-XL) 100 MG 24 hr tablet [Pharmacy Med Name: METOPROLOL SUCC  MG TAB] 90 tablet 1     Sig: TAKE 1 TABLET BY MOUTH EVERY DAY    Beta-Blockers Refill Protocol Passed - 3/13/2019  6:19 PM       Passed - PCP or prescribing provider visit in past 12 months or next 3 months    Last office visit with prescriber/PCP: 9/5/2018 Martin Rapp MD OR same dept: 11/28/2018 Giorgio Dodson CNP OR same specialty: 11/28/2018 Giorgio Dodson CNP  Last physical: Visit date not found Last MTM visit: Visit date not found   Next visit within 3 mo: Visit date not found  Next physical within 3 mo: Visit date not found  Prescriber OR PCP: Martin Rapp MD  Last diagnosis associated with med order: 1. Benign Essential Hypertension  - metoprolol succinate (TOPROL-XL) 100 MG 24 hr tablet [Pharmacy Med Name: METOPROLOL SUCC  MG TAB]; TAKE 1 TABLET BY MOUTH EVERY DAY  Dispense: 90 tablet; Refill: 1  - lisinopril (PRINIVIL,ZESTRIL) 30 MG tablet [Pharmacy Med Name: LISINOPRIL 30 MG TABLET]; TAKE 1 TABLET BY MOUTH EVERY DAY  Dispense: 90 tablet; Refill: 1    2. Hyperlipidemia, unspecified hyperlipidemia type  - simvastatin (ZOCOR) 20 MG tablet [Pharmacy Med Name: SIMVASTATIN 20 MG TABLET]; TAKE 1 TABLET (20 MG TOTAL) BY MOUTH BEDTIME.  Dispense: 90 tablet; Refill: 1    3. Diabetes (H)  - metFORMIN (GLUCOPHAGE) 500 MG tablet [Pharmacy Med Name: METFORMIN  MG TABLET]; TAKE 2 TABLETS BY MOUTH TWICE A DAY  Dispense: 360 tablet; Refill: 1    If protocol passes may refill for 12 months if within 3 months of last provider visit (or a total of 15 months).            Passed - Blood pressure filed in past 12 months    BP Readings from  Last 1 Encounters:   11/28/18 124/64             simvastatin (ZOCOR) 20 MG tablet [Pharmacy Med Name: SIMVASTATIN 20 MG TABLET] 90 tablet 1     Sig: TAKE 1 TABLET (20 MG TOTAL) BY MOUTH BEDTIME.    Statins Refill Protocol (Hmg CoA Reductase Inhibitors) Passed - 3/13/2019  6:19 PM       Passed - PCP or prescribing provider visit in past 12 months     Last office visit with prescriber/PCP: 9/5/2018 Martin Rapp MD OR same dept: 11/28/2018 Giorgio Dodson CNP OR same specialty: 11/28/2018 Giorgio Dodson CNP  Last physical: Visit date not found Last MTM visit: Visit date not found   Next visit within 3 mo: Visit date not found  Next physical within 3 mo: Visit date not found  Prescriber OR PCP: Martin Rapp MD  Last diagnosis associated with med order: 1. Benign Essential Hypertension  - metoprolol succinate (TOPROL-XL) 100 MG 24 hr tablet [Pharmacy Med Name: METOPROLOL SUCC  MG TAB]; TAKE 1 TABLET BY MOUTH EVERY DAY  Dispense: 90 tablet; Refill: 1  - lisinopril (PRINIVIL,ZESTRIL) 30 MG tablet [Pharmacy Med Name: LISINOPRIL 30 MG TABLET]; TAKE 1 TABLET BY MOUTH EVERY DAY  Dispense: 90 tablet; Refill: 1    2. Hyperlipidemia, unspecified hyperlipidemia type  - simvastatin (ZOCOR) 20 MG tablet [Pharmacy Med Name: SIMVASTATIN 20 MG TABLET]; TAKE 1 TABLET (20 MG TOTAL) BY MOUTH BEDTIME.  Dispense: 90 tablet; Refill: 1    3. Diabetes (H)  - metFORMIN (GLUCOPHAGE) 500 MG tablet [Pharmacy Med Name: METFORMIN  MG TABLET]; TAKE 2 TABLETS BY MOUTH TWICE A DAY  Dispense: 360 tablet; Refill: 1    If protocol passes may refill for 12 months if within 3 months of last provider visit (or a total of 15 months).             lisinopril (PRINIVIL,ZESTRIL) 30 MG tablet [Pharmacy Med Name: LISINOPRIL 30 MG TABLET] 90 tablet 1     Sig: TAKE 1 TABLET BY MOUTH EVERY DAY    Ace Inhibitors Refill Protocol Passed - 3/13/2019  6:19 PM       Passed - PCP or prescribing provider visit in past 12 months      Last  office visit with prescriber/PCP: 9/5/2018 Martin Rapp MD OR same dept: 11/28/2018 Giorgio Dodson CNP OR same specialty: 11/28/2018 Giorgio Dodson CNP  Last physical: Visit date not found Last MTM visit: Visit date not found   Next visit within 3 mo: Visit date not found  Next physical within 3 mo: Visit date not found  Prescriber OR PCP: Martin Rapp MD  Last diagnosis associated with med order: 1. Benign Essential Hypertension  - metoprolol succinate (TOPROL-XL) 100 MG 24 hr tablet [Pharmacy Med Name: METOPROLOL SUCC  MG TAB]; TAKE 1 TABLET BY MOUTH EVERY DAY  Dispense: 90 tablet; Refill: 1  - lisinopril (PRINIVIL,ZESTRIL) 30 MG tablet [Pharmacy Med Name: LISINOPRIL 30 MG TABLET]; TAKE 1 TABLET BY MOUTH EVERY DAY  Dispense: 90 tablet; Refill: 1    2. Hyperlipidemia, unspecified hyperlipidemia type  - simvastatin (ZOCOR) 20 MG tablet [Pharmacy Med Name: SIMVASTATIN 20 MG TABLET]; TAKE 1 TABLET (20 MG TOTAL) BY MOUTH BEDTIME.  Dispense: 90 tablet; Refill: 1    3. Diabetes (H)  - metFORMIN (GLUCOPHAGE) 500 MG tablet [Pharmacy Med Name: METFORMIN  MG TABLET]; TAKE 2 TABLETS BY MOUTH TWICE A DAY  Dispense: 360 tablet; Refill: 1    If protocol passes may refill for 12 months if within 3 months of last provider visit (or a total of 15 months).            Passed - Serum Potassium in past 12 months    Lab Results   Component Value Date    Potassium 4.3 11/20/2018            Passed - Blood pressure filed in past 12 months    BP Readings from Last 1 Encounters:   11/28/18 124/64            Passed - Serum Creatinine in past 12 months    Creatinine   Date Value Ref Range Status   11/20/2018 0.93 0.60 - 1.10 mg/dL Final             metFORMIN (GLUCOPHAGE) 500 MG tablet [Pharmacy Med Name: METFORMIN  MG TABLET] 360 tablet 1     Sig: TAKE 2 TABLETS BY MOUTH TWICE A DAY    Metformin Refill Protocol Failed - 3/13/2019  6:19 PM       Failed - Microalbumin in last year     Microalbumin, Random  Urine   Date Value Ref Range Status   02/06/2018 1.46 0.00 - 1.99 mg/dL Final                 Passed - Blood pressure in last 12 months    BP Readings from Last 1 Encounters:   11/28/18 124/64            Passed - LFT or AST or ALT in last 12 months    Albumin   Date Value Ref Range Status   11/20/2018 3.2 (L) 3.5 - 5.0 g/dL Final     Bilirubin, Total   Date Value Ref Range Status   11/20/2018 0.4 0.0 - 1.0 mg/dL Final     Bilirubin, Direct   Date Value Ref Range Status   03/27/2015 0.2 <=0.5 mg/dL Final     Alkaline Phosphatase   Date Value Ref Range Status   11/20/2018 49 45 - 120 U/L Final     AST   Date Value Ref Range Status   11/20/2018 27 0 - 40 U/L Final     ALT   Date Value Ref Range Status   11/20/2018 22 0 - 45 U/L Final     Protein, Total   Date Value Ref Range Status   11/20/2018 7.8 6.0 - 8.0 g/dL Final               Passed - GFR or Serum Creatinine in last 6 months    GFR MDRD Non Af Amer   Date Value Ref Range Status   11/20/2018 58 (L) >60 mL/min/1.73m2 Final     GFR MDRD Af Amer   Date Value Ref Range Status   11/20/2018 >60 >60 mL/min/1.73m2 Final            Passed - Visit with PCP or prescribing provider visit in last 6 months or next 3 months    Last office visit with prescriber/PCP: Visit date not found OR same dept: 11/28/2018 Giorgio Dodson CNP OR same specialty: 11/28/2018 Giorgio Dodson CNP Last physical: Visit date not found Last MTM visit: Visit date not found         Next appt within 3 mo: Visit date not found  Next physical within 3 mo: Visit date not found  Prescriber OR PCP: Martin Rapp MD  Last diagnosis associated with med order: 1. Benign Essential Hypertension  - metoprolol succinate (TOPROL-XL) 100 MG 24 hr tablet [Pharmacy Med Name: METOPROLOL SUCC  MG TAB]; TAKE 1 TABLET BY MOUTH EVERY DAY  Dispense: 90 tablet; Refill: 1  - lisinopril (PRINIVIL,ZESTRIL) 30 MG tablet [Pharmacy Med Name: LISINOPRIL 30 MG TABLET]; TAKE 1 TABLET BY MOUTH EVERY DAY  Dispense: 90  tablet; Refill: 1    2. Hyperlipidemia, unspecified hyperlipidemia type  - simvastatin (ZOCOR) 20 MG tablet [Pharmacy Med Name: SIMVASTATIN 20 MG TABLET]; TAKE 1 TABLET (20 MG TOTAL) BY MOUTH BEDTIME.  Dispense: 90 tablet; Refill: 1    3. Diabetes (H)  - metFORMIN (GLUCOPHAGE) 500 MG tablet [Pharmacy Med Name: METFORMIN  MG TABLET]; TAKE 2 TABLETS BY MOUTH TWICE A DAY  Dispense: 360 tablet; Refill: 1     If protocol passes may refill for 12 months if within 3 months of last provider visit (or a total of 15 months).          Passed - A1C in last 6 months    Hemoglobin A1c   Date Value Ref Range Status   11/20/2018 7.3 (H) 3.5 - 6.0 % Final

## 2021-06-25 NOTE — TELEPHONE ENCOUNTER
RN cannot approve Refill Request    RN can NOT refill this medication Protocol failed and NO refill given. Last office visit: 9/5/2018 Martin Rapp MD Last Physical: Visit date not found Last MTM visit: Visit date not found Last visit same specialty: 11/28/2018 Giorgio Dodson, CNP.  Next visit within 3 mo: Visit date not found  Next physical within 3 mo: Visit date not found      Robby Matos, [facility] Triage/Med Refill 3/17/2019    Requested Prescriptions   Pending Prescriptions Disp Refills     metFORMIN (GLUCOPHAGE) 500 MG tablet [Pharmacy Med Name: METFORMIN  MG TABLET] 360 tablet 1     Sig: TAKE 2 TABLETS BY MOUTH TWICE A DAY    Metformin Refill Protocol Failed - 3/13/2019  6:19 PM       Failed - Microalbumin in last year     Microalbumin, Random Urine   Date Value Ref Range Status   02/06/2018 1.46 0.00 - 1.99 mg/dL Final                 Passed - Blood pressure in last 12 months    BP Readings from Last 1 Encounters:   11/28/18 124/64            Passed - LFT or AST or ALT in last 12 months    Albumin   Date Value Ref Range Status   11/20/2018 3.2 (L) 3.5 - 5.0 g/dL Final     Bilirubin, Total   Date Value Ref Range Status   11/20/2018 0.4 0.0 - 1.0 mg/dL Final     Bilirubin, Direct   Date Value Ref Range Status   03/27/2015 0.2 <=0.5 mg/dL Final     Alkaline Phosphatase   Date Value Ref Range Status   11/20/2018 49 45 - 120 U/L Final     AST   Date Value Ref Range Status   11/20/2018 27 0 - 40 U/L Final     ALT   Date Value Ref Range Status   11/20/2018 22 0 - 45 U/L Final     Protein, Total   Date Value Ref Range Status   11/20/2018 7.8 6.0 - 8.0 g/dL Final               Passed - GFR or Serum Creatinine in last 6 months    GFR MDRD Non Af Amer   Date Value Ref Range Status   11/20/2018 58 (L) >60 mL/min/1.73m2 Final     GFR MDRD Af Amer   Date Value Ref Range Status   11/20/2018 >60 >60 mL/min/1.73m2 Final            Passed - Visit with PCP or prescribing provider visit in last 6  months or next 3 months    Last office visit with prescriber/PCP: Visit date not found OR same dept: 11/28/2018 Giorgio Dodson CNP OR same specialty: 11/28/2018 Giorgio Dodson CNP Last physical: Visit date not found Last MTM visit: Visit date not found         Next appt within 3 mo: Visit date not found  Next physical within 3 mo: Visit date not found  Prescriber OR PCP: Martin Rapp MD  Last diagnosis associated with med order: 1. Benign Essential Hypertension  - metoprolol succinate (TOPROL-XL) 100 MG 24 hr tablet; TAKE 1 TABLET BY MOUTH EVERY DAY  Dispense: 90 tablet; Refill: 1  - lisinopril (PRINIVIL,ZESTRIL) 30 MG tablet; TAKE 1 TABLET BY MOUTH EVERY DAY  Dispense: 90 tablet; Refill: 1    2. Hyperlipidemia, unspecified hyperlipidemia type  - simvastatin (ZOCOR) 20 MG tablet; TAKE 1 TABLET (20 MG TOTAL) BY MOUTH BEDTIME.  Dispense: 90 tablet; Refill: 1    3. Diabetes (H)  - metFORMIN (GLUCOPHAGE) 500 MG tablet [Pharmacy Med Name: METFORMIN  MG TABLET]; TAKE 2 TABLETS BY MOUTH TWICE A DAY  Dispense: 360 tablet; Refill: 1     If protocol passes may refill for 12 months if within 3 months of last provider visit (or a total of 15 months).          Passed - A1C in last 6 months    Hemoglobin A1c   Date Value Ref Range Status   11/20/2018 7.3 (H) 3.5 - 6.0 % Final             Signed Prescriptions Disp Refills     metoprolol succinate (TOPROL-XL) 100 MG 24 hr tablet 90 tablet 1     Sig: TAKE 1 TABLET BY MOUTH EVERY DAY    Beta-Blockers Refill Protocol Passed - 3/13/2019  6:19 PM       Passed - PCP or prescribing provider visit in past 12 months or next 3 months    Last office visit with prescriber/PCP: 9/5/2018 Martin Rapp MD OR same dept: 11/28/2018 Giorgio Dodson CNP OR same specialty: 11/28/2018 Giorgio Dodson CNP  Last physical: Visit date not found Last MTM visit: Visit date not found   Next visit within 3 mo: Visit date not found  Next physical within 3 mo: Visit date not  found  Prescriber OR PCP: Martin Rapp MD  Last diagnosis associated with med order: 1. Benign Essential Hypertension  - metoprolol succinate (TOPROL-XL) 100 MG 24 hr tablet; TAKE 1 TABLET BY MOUTH EVERY DAY  Dispense: 90 tablet; Refill: 1  - lisinopril (PRINIVIL,ZESTRIL) 30 MG tablet; TAKE 1 TABLET BY MOUTH EVERY DAY  Dispense: 90 tablet; Refill: 1    2. Hyperlipidemia, unspecified hyperlipidemia type  - simvastatin (ZOCOR) 20 MG tablet; TAKE 1 TABLET (20 MG TOTAL) BY MOUTH BEDTIME.  Dispense: 90 tablet; Refill: 1    3. Diabetes (H)  - metFORMIN (GLUCOPHAGE) 500 MG tablet [Pharmacy Med Name: METFORMIN  MG TABLET]; TAKE 2 TABLETS BY MOUTH TWICE A DAY  Dispense: 360 tablet; Refill: 1    If protocol passes may refill for 12 months if within 3 months of last provider visit (or a total of 15 months).            Passed - Blood pressure filed in past 12 months    BP Readings from Last 1 Encounters:   11/28/18 124/64             simvastatin (ZOCOR) 20 MG tablet 90 tablet 1     Sig: TAKE 1 TABLET (20 MG TOTAL) BY MOUTH BEDTIME.    Statins Refill Protocol (Hmg CoA Reductase Inhibitors) Passed - 3/13/2019  6:19 PM       Passed - PCP or prescribing provider visit in past 12 months     Last office visit with prescriber/PCP: 9/5/2018 Martin Rapp MD OR same dept: 11/28/2018 Giorgio Dodson CNP OR same specialty: 11/28/2018 Giorgio Dodson CNP  Last physical: Visit date not found Last [facility] visit: Visit date not found   Next visit within 3 mo: Visit date not found  Next physical within 3 mo: Visit date not found  Prescriber OR PCP: Martin Rapp MD  Last diagnosis associated with med order: 1. Benign Essential Hypertension  - metoprolol succinate (TOPROL-XL) 100 MG 24 hr tablet; TAKE 1 TABLET BY MOUTH EVERY DAY  Dispense: 90 tablet; Refill: 1  - lisinopril (PRINIVIL,ZESTRIL) 30 MG tablet; TAKE 1 TABLET BY MOUTH EVERY DAY  Dispense: 90 tablet; Refill: 1    2. Hyperlipidemia, unspecified  hyperlipidemia type  - simvastatin (ZOCOR) 20 MG tablet; TAKE 1 TABLET (20 MG TOTAL) BY MOUTH BEDTIME.  Dispense: 90 tablet; Refill: 1    3. Diabetes (H)  - metFORMIN (GLUCOPHAGE) 500 MG tablet [Pharmacy Med Name: METFORMIN  MG TABLET]; TAKE 2 TABLETS BY MOUTH TWICE A DAY  Dispense: 360 tablet; Refill: 1    If protocol passes may refill for 12 months if within 3 months of last provider visit (or a total of 15 months).             lisinopril (PRINIVIL,ZESTRIL) 30 MG tablet 90 tablet 1     Sig: TAKE 1 TABLET BY MOUTH EVERY DAY    Ace Inhibitors Refill Protocol Passed - 3/13/2019  6:19 PM       Passed - PCP or prescribing provider visit in past 12 months      Last office visit with prescriber/PCP: 9/5/2018 Martin Rapp MD OR same dept: 11/28/2018 Giorgio Dodson CNP OR same specialty: 11/28/2018 Giorgio Dodson CNP  Last physical: Visit date not found Last MTM visit: Visit date not found   Next visit within 3 mo: Visit date not found  Next physical within 3 mo: Visit date not found  Prescriber OR PCP: Martin Rapp MD  Last diagnosis associated with med order: 1. Benign Essential Hypertension  - metoprolol succinate (TOPROL-XL) 100 MG 24 hr tablet; TAKE 1 TABLET BY MOUTH EVERY DAY  Dispense: 90 tablet; Refill: 1  - lisinopril (PRINIVIL,ZESTRIL) 30 MG tablet; TAKE 1 TABLET BY MOUTH EVERY DAY  Dispense: 90 tablet; Refill: 1    2. Hyperlipidemia, unspecified hyperlipidemia type  - simvastatin (ZOCOR) 20 MG tablet; TAKE 1 TABLET (20 MG TOTAL) BY MOUTH BEDTIME.  Dispense: 90 tablet; Refill: 1    3. Diabetes (H)  - metFORMIN (GLUCOPHAGE) 500 MG tablet [Pharmacy Med Name: METFORMIN  MG TABLET]; TAKE 2 TABLETS BY MOUTH TWICE A DAY  Dispense: 360 tablet; Refill: 1    If protocol passes may refill for 12 months if within 3 months of last provider visit (or a total of 15 months).            Passed - Serum Potassium in past 12 months    Lab Results   Component Value Date    Potassium 4.3 11/20/2018             Passed - Blood pressure filed in past 12 months    BP Readings from Last 1 Encounters:   11/28/18 124/64            Passed - Serum Creatinine in past 12 months    Creatinine   Date Value Ref Range Status   11/20/2018 0.93 0.60 - 1.10 mg/dL Final

## 2021-06-26 NOTE — TELEPHONE ENCOUNTER
Son, Alex, contacted. He states Dr Patel was going to call after she spoke with someone at [facility]. He will await her call back.

## 2021-06-26 NOTE — TELEPHONE ENCOUNTER
I did promised him I would call [facility].  I have called and spoke to the on-call who suggested I treat her for thrush esophagitis.  The patient is diabetic and that is a common cause.  Her EGD in 2016 looked normal when he looked it over.  He asked if she was continuing on a PPI and as far as I know she is.  He said he would call and try to get her in earlier.  He will call the patient and in the meantime I should treat her for thrush with swish and swallow nystatin.  I then called the son and notified him that I sent through the prescription for nystatin and told him what the plan was.

## 2021-07-02 ENCOUNTER — AMBULATORY - HEALTHEAST (OUTPATIENT)
Dept: LAB | Facility: CLINIC | Age: 86
End: 2021-07-02

## 2021-07-02 DIAGNOSIS — Z11.59 ENCOUNTER FOR SCREENING FOR OTHER VIRAL DISEASES: ICD-10-CM

## 2021-07-02 LAB
SARS-COV-2 PCR COMMENT: NORMAL
SARS-COV-2 RNA SPEC QL NAA+PROBE: NEGATIVE
SARS-COV-2 VIRUS SPECIMEN SOURCE: NORMAL

## 2021-07-03 ENCOUNTER — COMMUNICATION - HEALTHEAST (OUTPATIENT)
Dept: SCHEDULING | Facility: CLINIC | Age: 86
End: 2021-07-03

## 2021-07-04 ENCOUNTER — COMMUNICATION - HEALTHEAST (OUTPATIENT)
Dept: SCHEDULING | Facility: CLINIC | Age: 86
End: 2021-07-04

## 2021-07-04 NOTE — LETTER
Letter by Neelima Patel MD at      Author: Neelima Patel MD Service: -- Author Type: --    Filed:  Encounter Date: 6/24/2021 Status: (Other)         Myron Meng  [address] [facility] [place] [address]             June 24, 2021         Dear Ms. Meng,    Below are the results from your recent visit:    Resulted Orders   HM2(CBC w/o Differential)   Result Value Ref Range    WBC 5.3 4.0 - 11.0 thou/uL    RBC 4.79 3.80 - 5.40 mill/uL    Hemoglobin 12.2 12.0 - 16.0 g/dL    Hematocrit 39.0 35.0 - 47.0 %    MCV 81 80 - 100 fL    MCH 25.5 (L) 27.0 - 34.0 pg    MCHC 31.3 (L) 32.0 - 36.0 g/dL    RDW 18.2 (H) 11.0 - 14.5 %    Platelets 196 140 - 440 thou/uL    MPV 10.0 7.0 - 10.0 fL   Glycosylated Hemoglobin A1c   Result Value Ref Range    Hemoglobin A1c 7.1 (H) <=5.6 %   Comprehensive Metabolic Panel   Result Value Ref Range    Sodium 140 136 - 145 mmol/L    Potassium 4.4 3.5 - 5.0 mmol/L    Chloride 103 98 - 107 mmol/L    CO2 28 22 - 31 mmol/L    Anion Gap, Calculation 9 5 - 18 mmol/L    Glucose 126 (H) 70 - 125 mg/dL    BUN 14 8 - 28 mg/dL    Creatinine 0.99 0.60 - 1.10 mg/dL    GFR MDRD Af Amer >60 >60 mL/min/1.73m2    GFR MDRD Non Af Amer 53 (L) >60 mL/min/1.73m2    Bilirubin, Total 0.5 0.0 - 1.0 mg/dL    Calcium 8.9 8.5 - 10.5 mg/dL    Protein, Total 7.9 6.0 - 8.0 g/dL    Albumin 3.7 3.5 - 5.0 g/dL    Alkaline Phosphatase 58 45 - 120 U/L    AST 29 0 - 40 U/L    ALT <45 0 - 45 U/L    Narrative    Fasting Glucose reference range is 70-99 mg/dL per  American Diabetes Association (ADA) guidelines.       These lab results are all very good.  Hope you were able to get the endoscopy done in a timely manner.    Please call with questions or contact us using [employer].    Sincerely,        Electronically signed by Neelima Patel MD (Betsy)

## 2021-07-06 ENCOUNTER — SURGERY - HEALTHEAST (OUTPATIENT)
Dept: SURGERY | Facility: CLINIC | Age: 86
End: 2021-07-06

## 2021-07-06 ASSESSMENT — MIFFLIN-ST. JEOR: SCORE: 853.44

## 2021-07-10 VITALS — BODY MASS INDEX: 18.58 KG/M2 | HEIGHT: 62 IN | WEIGHT: 101 LBS

## 2021-07-12 ENCOUNTER — TRANSFERRED RECORDS (OUTPATIENT)
Dept: HEALTH INFORMATION MANAGEMENT | Facility: CLINIC | Age: 86
End: 2021-07-12

## 2021-07-30 DIAGNOSIS — I10 ESSENTIAL HYPERTENSION, BENIGN: Primary | ICD-10-CM

## 2021-07-30 RX ORDER — LISINOPRIL 30 MG/1
30 TABLET ORAL DAILY
Qty: 90 TABLET | Refills: 0 | Status: SHIPPED | OUTPATIENT
Start: 2021-07-30 | End: 2021-10-31

## 2021-07-30 RX ORDER — LISINOPRIL 30 MG/1
30 TABLET ORAL DAILY
COMMUNITY
Start: 2020-10-20 | End: 2021-07-30

## 2021-07-30 RX ORDER — METOPROLOL SUCCINATE 100 MG/1
100 TABLET, EXTENDED RELEASE ORAL DAILY
COMMUNITY
Start: 2020-10-20 | End: 2021-07-30

## 2021-07-30 RX ORDER — METOPROLOL SUCCINATE 100 MG/1
100 TABLET, EXTENDED RELEASE ORAL DAILY
Qty: 90 TABLET | Refills: 0 | Status: SHIPPED | OUTPATIENT
Start: 2021-07-30 | End: 2021-10-31

## 2021-07-30 NOTE — TELEPHONE ENCOUNTER
Pending Prescriptions:                       Disp   Refills    metoprolol succinate ER (TOPROL-XL) 100 M*90 tab*2            Sig: Take 1 tablet (100 mg) by mouth daily    Signed Prescriptions:                        Disp   Refills    metoprolol succinate ER (TOPROL-XL) 100 MG*                Sig: Take 100 mg by mouth daily  Authorizing Provider: PATIENT REPORTED  Ordering User: MARTINA WINSTON    Last Date Filled: 4.25.21  Last appointment addressing medication: 6/22/21  Last B/P:  BP Readings from Last 3 Encounters:   06/22/21 128/70   03/11/21 118/62   11/03/20 (!) 140/80     Last labs pertaining to refill: 6/22/21

## 2021-07-30 NOTE — TELEPHONE ENCOUNTER
Pending Prescriptions:                       Disp   Refills    lisinopril (ZESTRIL) 30 MG tablet         90 tab*2            Sig: Take 1 tablet (30 mg) by mouth daily    Signed Prescriptions:                        Disp   Refills    lisinopril (ZESTRIL) 30 MG tablet                          Sig: Take 30 mg by mouth daily  Authorizing Provider: PATIENT REPORTED  Ordering User: MARTINA WINSTON    Last Date Filled: 4.25.21  Last appointment addressing medication: 6/22/21  Last B/P:  BP Readings from Last 3 Encounters:   06/22/21 128/70   03/11/21 118/62   11/03/20 (!) 140/80     Last labs pertaining to refill:6/22/21

## 2021-08-04 DIAGNOSIS — E11.9 TYPE 2 DIABETES MELLITUS WITHOUT COMPLICATION, WITHOUT LONG-TERM CURRENT USE OF INSULIN (H): Primary | ICD-10-CM

## 2021-08-04 DIAGNOSIS — E11.9 DIABETES (H): ICD-10-CM

## 2021-08-04 NOTE — TELEPHONE ENCOUNTER
Medication: Metformin  MG  Last Date Filled: 5/7/21  Last appointment addressing medication: 6/22/21  Last B/P:  BP Readings from Last 3 Encounters:   06/22/21 128/70   03/11/21 118/62   11/03/20 (!) 140/80     Last labs pertaining to refill: 6/22/21

## 2021-08-08 NOTE — TELEPHONE ENCOUNTER
"Last Written Prescription Date:  5/7/21  Last Fill Quantity: 360,  # refills: 0   Last office visit provider:  6/22/21     Requested Prescriptions   Pending Prescriptions Disp Refills     metFORMIN (GLUCOPHAGE) 500 MG tablet 360 tablet 0     Sig: Take 1 tablet (500 mg) by mouth 2 times daily (with meals)       Biguanide Agents Failed - 8/5/2021 12:03 PM        Failed - Recent (6 mo) or future (30 days) visit within the authorizing provider's specialty     Patient had office visit in the last 6 months or has a visit in the next 30 days with authorizing provider or within the authorizing provider's specialty.  See \"Patient Info\" tab in inbasket, or \"Choose Columns\" in Meds & Orders section of the refill encounter.            Passed - Patient is age 10 or older        Passed - Patient has documented A1c within the specified period of time.     If HgbA1C is 8 or greater, it needs to be on file within the past 3 months.  If less than 8, must be on file within the past 6 months.     Recent Labs   Lab Test 06/22/21  0834   A1C 7.1*             Passed - Patient's CR is NOT>1.4 OR Patient's EGFR is NOT<45 within past 12 mos.     Recent Labs   Lab Test 06/22/21  0834   GFRESTIMATED 53*   GFRESTBLACK >60       Recent Labs   Lab Test 06/22/21  0834   CR 0.99             Passed - Patient does NOT have a diagnosis of CHF.        Passed - Medication is active on med list        Passed - Patient is not pregnant        Passed - Patient has not had a positive pregnancy test within the past 12 mos.          Signed Prescriptions Disp Refills    metFORMIN (GLUCOPHAGE) 500 MG tablet       Sig: Take 500 mg by mouth 2 times daily (with meals)       There is no refill protocol information for this order          Emmanuel Raines RN 08/08/21 11:51 AM  "

## 2021-09-04 ENCOUNTER — HEALTH MAINTENANCE LETTER (OUTPATIENT)
Age: 86
End: 2021-09-04

## 2021-10-31 DIAGNOSIS — I10 ESSENTIAL HYPERTENSION, BENIGN: ICD-10-CM

## 2021-10-31 RX ORDER — LISINOPRIL 30 MG/1
TABLET ORAL
Qty: 90 TABLET | Refills: 2 | Status: SHIPPED | OUTPATIENT
Start: 2021-10-31 | End: 2022-05-19

## 2021-10-31 RX ORDER — METOPROLOL SUCCINATE 100 MG/1
TABLET, EXTENDED RELEASE ORAL
Qty: 90 TABLET | Refills: 2 | Status: SHIPPED | OUTPATIENT
Start: 2021-10-31 | End: 2022-05-19

## 2021-10-31 NOTE — TELEPHONE ENCOUNTER
"Last Written Prescription Date:  7/30/21  Last Fill Quantity: 90,  # refills: 0   Last office visit provider:  6/22/21     Requested Prescriptions   Pending Prescriptions Disp Refills     lisinopril (ZESTRIL) 30 MG tablet [Pharmacy Med Name: LISINOPRIL 30 MG TABLET] 90 tablet 0     Sig: TAKE 1 TABLET BY MOUTH EVERY DAY       ACE Inhibitors (Including Combos) Protocol Passed - 10/31/2021 12:17 AM        Passed - Blood pressure under 140/90 in past 12 months     BP Readings from Last 3 Encounters:   06/22/21 128/70   03/11/21 118/62   11/03/20 (!) 140/80                 Passed - Recent (12 mo) or future (30 days) visit within the authorizing provider's specialty     Patient has had an office visit with the authorizing provider or a provider within the authorizing providers department within the previous 12 mos or has a future within next 30 days. See \"Patient Info\" tab in inbasket, or \"Choose Columns\" in Meds & Orders section of the refill encounter.              Passed - Medication is active on med list        Passed - Patient is age 18 or older        Passed - No active pregnancy on record        Passed - Normal serum creatinine on file in past 12 months     Recent Labs   Lab Test 06/22/21  0834   CR 0.99       Ok to refill medication if creatinine is low          Passed - Normal serum potassium on file in past 12 months     Recent Labs   Lab Test 06/22/21  0834   POTASSIUM 4.4             Passed - No positive pregnancy test within past 12 months           metoprolol succinate ER (TOPROL-XL) 100 MG 24 hr tablet [Pharmacy Med Name: METOPROLOL SUCC  MG TAB] 90 tablet 0     Sig: TAKE 1 TABLET BY MOUTH EVERY DAY. DUE FOR VISIT IN OCTOBER       Beta-Blockers Protocol Passed - 10/31/2021 12:17 AM        Passed - Blood pressure under 140/90 in past 12 months     BP Readings from Last 3 Encounters:   06/22/21 128/70   03/11/21 118/62   11/03/20 (!) 140/80                 Passed - Patient is age 6 or older        " "Passed - Recent (12 mo) or future (30 days) visit within the authorizing provider's specialty     Patient has had an office visit with the authorizing provider or a provider within the authorizing providers department within the previous 12 mos or has a future within next 30 days. See \"Patient Info\" tab in inbasket, or \"Choose Columns\" in Meds & Orders section of the refill encounter.              Passed - Medication is active on med list             betty kiser RN 10/31/21 3:22 PM  "

## 2021-11-04 ENCOUNTER — OFFICE VISIT (OUTPATIENT)
Dept: FAMILY MEDICINE | Facility: CLINIC | Age: 86
End: 2021-11-04
Payer: COMMERCIAL

## 2021-11-04 VITALS
DIASTOLIC BLOOD PRESSURE: 76 MMHG | OXYGEN SATURATION: 98 % | HEIGHT: 61 IN | HEART RATE: 97 BPM | SYSTOLIC BLOOD PRESSURE: 122 MMHG | BODY MASS INDEX: 19.45 KG/M2 | WEIGHT: 103 LBS

## 2021-11-04 DIAGNOSIS — K74.60 CIRRHOSIS OF LIVER WITHOUT ASCITES, UNSPECIFIED HEPATIC CIRRHOSIS TYPE (H): Chronic | ICD-10-CM

## 2021-11-04 DIAGNOSIS — Z00.00 ENCOUNTER FOR MEDICARE ANNUAL WELLNESS EXAM: Primary | ICD-10-CM

## 2021-11-04 DIAGNOSIS — E11.9 TYPE 2 DIABETES MELLITUS WITHOUT COMPLICATION, WITHOUT LONG-TERM CURRENT USE OF INSULIN (H): Chronic | ICD-10-CM

## 2021-11-04 DIAGNOSIS — Z23 IMMUNIZATION DUE: ICD-10-CM

## 2021-11-04 DIAGNOSIS — I10 BENIGN ESSENTIAL HYPERTENSION: Chronic | ICD-10-CM

## 2021-11-04 DIAGNOSIS — E78.2 MIXED HYPERLIPIDEMIA: Chronic | ICD-10-CM

## 2021-11-04 DIAGNOSIS — K76.6 PORTAL HYPERTENSION (H): Chronic | ICD-10-CM

## 2021-11-04 DIAGNOSIS — I25.10 ATHEROSCLEROSIS OF NATIVE CORONARY ARTERY OF NATIVE HEART WITHOUT ANGINA PECTORIS: Chronic | ICD-10-CM

## 2021-11-04 DIAGNOSIS — M81.0 AGE-RELATED OSTEOPOROSIS WITHOUT CURRENT PATHOLOGICAL FRACTURE: Chronic | ICD-10-CM

## 2021-11-04 DIAGNOSIS — K21.9 GASTROESOPHAGEAL REFLUX DISEASE WITHOUT ESOPHAGITIS: Chronic | ICD-10-CM

## 2021-11-04 DIAGNOSIS — N18.31 STAGE 3A CHRONIC KIDNEY DISEASE (H): Chronic | ICD-10-CM

## 2021-11-04 PROBLEM — E78.5 HYPERLIPIDEMIA: Chronic | Status: ACTIVE | Noted: 2021-11-04

## 2021-11-04 PROBLEM — E78.5 HYPERLIPIDEMIA: Status: ACTIVE | Noted: 2021-11-04

## 2021-11-04 PROBLEM — N18.30 CHRONIC KIDNEY DISEASE, STAGE 3 (H): Chronic | Status: ACTIVE | Noted: 2021-03-11

## 2021-11-04 PROBLEM — N18.30 CHRONIC KIDNEY DISEASE, STAGE 3 (H): Status: ACTIVE | Noted: 2021-03-11

## 2021-11-04 PROBLEM — R13.10 DYSPHAGIA: Status: ACTIVE | Noted: 2021-11-04

## 2021-11-04 PROBLEM — K80.20 CHOLELITHIASIS: Status: ACTIVE | Noted: 2021-11-04

## 2021-11-04 LAB — HBA1C MFR BLD: 7.3 % (ref 0–5.6)

## 2021-11-04 PROCEDURE — 80048 BASIC METABOLIC PNL TOTAL CA: CPT | Performed by: FAMILY MEDICINE

## 2021-11-04 PROCEDURE — 90662 IIV NO PRSV INCREASED AG IM: CPT | Performed by: FAMILY MEDICINE

## 2021-11-04 PROCEDURE — 99397 PER PM REEVAL EST PAT 65+ YR: CPT | Mod: 25 | Performed by: FAMILY MEDICINE

## 2021-11-04 PROCEDURE — 83615 LACTATE (LD) (LDH) ENZYME: CPT | Performed by: FAMILY MEDICINE

## 2021-11-04 PROCEDURE — 99207 PR FOOT EXAM NO CHARGE: CPT | Mod: 25 | Performed by: FAMILY MEDICINE

## 2021-11-04 PROCEDURE — 90471 IMMUNIZATION ADMIN: CPT | Performed by: FAMILY MEDICINE

## 2021-11-04 PROCEDURE — 36415 COLL VENOUS BLD VENIPUNCTURE: CPT | Performed by: FAMILY MEDICINE

## 2021-11-04 PROCEDURE — 83036 HEMOGLOBIN GLYCOSYLATED A1C: CPT | Performed by: FAMILY MEDICINE

## 2021-11-04 PROCEDURE — 99214 OFFICE O/P EST MOD 30 MIN: CPT | Mod: 25 | Performed by: FAMILY MEDICINE

## 2021-11-04 RX ORDER — SIMVASTATIN 20 MG
TABLET ORAL
COMMUNITY
Start: 2021-05-07 | End: 2022-05-19

## 2021-11-04 RX ORDER — PANTOPRAZOLE SODIUM 40 MG/1
40 TABLET, DELAYED RELEASE ORAL DAILY
Qty: 90 TABLET | Refills: 1 | Status: SHIPPED | OUTPATIENT
Start: 2021-11-04 | End: 2022-05-19

## 2021-11-04 ASSESSMENT — ACTIVITIES OF DAILY LIVING (ADL): CURRENT_FUNCTION: NO ASSISTANCE NEEDED

## 2021-11-04 ASSESSMENT — MIFFLIN-ST. JEOR: SCORE: 854.58

## 2021-11-04 NOTE — PATIENT INSTRUCTIONS
Patient Education   Personalized Prevention Plan  You are due for the preventive services outlined below.  Your care team is available to assist you in scheduling these services.  If you have already completed any of these items, please share that information with your care team to update in your medical record.  Health Maintenance Due   Topic Date Due     Diabetic Foot Exam  Never done     Zoster (Shingles) Vaccine (1 of 2) Never done     Flu Vaccine (1) 09/01/2021     FALL RISK ASSESSMENT  11/03/2021       Exercise for a Healthier Heart  You may wonder how you can improve the health of your heart. If you re thinking about exercise, you re on the right track. You don t need to become an athlete. But you do need a certain amount of brisk exercise to help strengthen your heart. If you have been diagnosed with a heart condition, your healthcare provider may advise exercise to help stabilize your condition. To help make exercise a habit, choose safe, fun activities.      Exercise with a friend. When activity is fun, you're more likely to stick with it.   Before you start  Check with your healthcare provider before starting an exercise program. This is especially important if you have not been active for a while. It's also important if you have a long-term (chronic) health problem such as heart disease, diabetes, or obesity. Or if you are at high risk for having these problems.   Why exercise?  Exercising regularly offers many healthy rewards. It can help you do all of the following:     Improve your blood cholesterol level to help prevent further heart trouble    Lower your blood pressure to help prevent a stroke or heart attack    Control diabetes, or reduce your risk of getting this disease    Improve your heart and lung function    Reach and stay at a healthy weight    Make your muscles stronger so you can stay active    Prevent falls and fractures by slowing the loss of bone mass (osteoporosis)    Manage stress  better    Reduce your blood pressure    Improve your sense of self and your body image  Exercise tips      Ease into your routine. Set small goals. Then build on them. If you are not sure what your activity level should be, talk with your healthcare provider first before starting an exercise routine.    Exercise on most days. Aim for a total of 150 minutes (2 hours and 30 minutes) or more of moderate-intensity aerobic activity each week. Or 75 minutes (1 hour and 15 minutes) or more of vigorous-intensity aerobic activity each week. Or try for a combination of both. Moderate activity means that you breathe heavier and your heart rate increases but you can still talk. Think about doing 40 minutes of moderate exercise, 3 to 4 times a week. For best results, activity should last for about 40 minutes to lower blood pressure and cholesterol. It's OK to work up to the 40-minute period over time. Examples of moderate-intensity activity are walking 1 mile in 15 minutes. Or doing 30 to 45 minutes of yard work.    Step up your daily activity level.  Along with your exercise program, try being more active the whole day. Walk instead of drive. Or park further away so that you take more steps each day. Do more household tasks or yard work. You may not be able to meet the advised mount of physical activity. But doing some moderate- or vigorous-intensity aerobic activity can help reduce your risk for heart disease. Your healthcare provider can help you figure out what is best for you.    Choose 1 or more activities you enjoy.  Walking is one of the easiest things you can do. You can also try swimming, riding a bike, dancing, or taking an exercise class.    When to call your healthcare provider  Call your healthcare provider if you have any of these:     Chest pain or feel dizzy or lightheaded    Burning, tightness, pressure, or heaviness in your chest, neck, shoulders, back, or arms    Abnormal shortness of breath    More joint or  muscle pain    A very fast or irregular heartbeat (palpitations)  [employer] last reviewed this educational content on 7/1/2019 2000-2021 [employer]. All rights reserved. This information is not intended as a substitute for professional medical care. Always follow your healthcare professional's instructions.          Understanding USDA MyPlate  The USDA has guidelines to help you make healthy food choices. These are called MyPlate. MyPlate shows the food groups that make up healthy meals using the image of a place setting. Before you eat, think about the healthiest choices for what to put on your plate or in your cup or bowl. To learn more about building a healthy plate, visit www.choosemyplate.gov.    The food groups    Fruits. Any fruit or 100% fruit juice counts as part of the Fruit Group. Fruits may be fresh, canned, frozen, or dried, and may be whole, cut-up, or pureed. Make 1/2 of your plate fruits and vegetables.    Vegetables. Any vegetable or 100% vegetable juice counts as a member of the Vegetable Group. Vegetables may be fresh, frozen, canned, or dried. They can be served raw or cooked and may be whole, cut-up, or mashed. Make 1/2 of your plate fruits and vegetables.    Grains. All foods made from grains are part of the Grains Group. These include wheat, rice, oats, cornmeal, and barley. Grains are often used to make foods such as bread, pasta, oatmeal, cereal, tortillas, and grits. Grains should be no more than 1/4 of your plate. At least half of your grains should be whole grains.    Protein. This group includes meat, poultry, seafood, beans and peas, eggs, processed soy products (such as tofu), nuts (including nut butters), and seeds. Make protein choices no more than 1/4 of your plate. Meat and poultry choices should be lean or low fat.    Dairy. The Dairy Group includes all fluid milk products and foods made from milk that contain calcium, such as yogurt and cheese. (Foods that have  little calcium, such as cream, butter, and cream cheese, are not part of this group.) Most dairy choices should be low-fat or fat-free.    Oils. Oils aren't a food group, but they do contain essential nutrients. However it's important to watch your intake of oils. These are fats that are liquid at room temperature. They include canola, corn, olive, soybean, vegetable, and sunflower oil. Foods that are mainly oil include mayonnaise, certain salad dressings, and soft margarines. You likely already get your daily oil allowance from the foods you eat.  Things to limit  Eating healthy also means limiting these things in your diet:       Salt (sodium). Many processed foods have a lot of sodium. To keep sodium intake down, eat fresh vegetables, meats, poultry, and seafood when possible. Purchase low-sodium, reduced-sodium, or no-salt-added food products at the store. And don't add salt to your meals at home. Instead, season them with herbs and spices such as dill, oregano, cumin, and paprika. Or try adding flavor with lemon or lime zest and juice.    Saturated fat. Saturated fats are most often found in animal products such as beef, pork, and chicken. They are often solid at room temperature, such as butter. To reduce your saturated fat intake, choose leaner cuts of meat and poultry. And try healthier cooking methods such as grilling, broiling, roasting, or baking. For a simple lower-fat swap, use plain nonfat yogurt instead of mayonnaise when making potato salad or macaroni salad.    Added sugars. These are sugars added to foods. They are in foods such as ice cream, candy, soda, fruit drinks, sports drinks, energy drinks, cookies, pastries, jams, and syrups. Cut down on added sugars by sharing sweet treats with a family member or friend. You can also choose fruit for dessert, and drink water or other unsweetened beverages.     [employer] last reviewed this educational content on 6/1/2020 2000-2021 [employer]  [employer]. All rights reserved. This information is not intended as a substitute for professional medical care. Always follow your healthcare professional's instructions.          Signs of Hearing Loss      Hearing much better with one ear can be a sign of hearing loss.   Hearing loss is a problem shared by many people. In fact, it is one of the most common health problems, particularly as people age. Most people age 65 and older have some hearing loss. By age 80, almost everyone does. Hearing loss often occurs slowly over the years. So you may not realize your hearing has gotten worse.  Have your hearing checked  Call your healthcare provider if you:    Have to strain to hear normal conversation    Have to watch other people s faces very carefully to follow what they re saying    Need to ask people to repeat what they ve said    Often misunderstand what people are saying    Turn the volume of the television or radio up so high that others complain    Feel that people are mumbling when they re talking to you    Find that the effort to hear leaves you feeling tired and irritated    Notice, when using the phone, that you hear better with one ear than the other  [employer] last reviewed this educational content on 1/1/2020 2000-2021 [employer]. All rights reserved. This information is not intended as a substitute for professional medical care. Always follow your healthcare professional's instructions.

## 2021-11-04 NOTE — LETTER
November 5, 2021      Myron Meng  [address] [facility] [address]  [place] [address]        Dear ,    We are writing to inform you of your test results.    These are very nice lab results.  See you back in 4-6 months.(Spring[place])    Resulted Orders   Hemoglobin A1c   Result Value Ref Range    Hemoglobin A1C 7.3 (H) 0.0 - 5.6 %      Comment:      Normal <5.7%   Prediabetes 5.7-6.4%    Diabetes 6.5% or higher     Note: Adopted from ADA consensus guidelines.   Lactate Dehydrogenase   Result Value Ref Range    Lactate Dehydrogenase 169 125 - 220 U/L   Basic metabolic panel   Result Value Ref Range    Sodium 141 136 - 145 mmol/L    Potassium 4.1 3.5 - 5.0 mmol/L    Chloride 106 98 - 107 mmol/L    Carbon Dioxide (CO2) 26 22 - 31 mmol/L    Anion Gap 9 5 - 18 mmol/L    Urea Nitrogen 11 8 - 28 mg/dL    Creatinine 0.90 0.60 - 1.10 mg/dL    Calcium 9.4 8.5 - 10.5 mg/dL    Glucose 111 70 - 125 mg/dL    GFR Estimate 59 (L) >60 mL/min/1.73m2      Comment:      As of July 11, 2021, eGFR is calculated by the CKD-EPI creatinine equation, without race adjustment. eGFR can be influenced by muscle mass, exercise, and diet. The reported eGFR is an estimation only and is only applicable if the renal function is stable.       If you have any questions or concerns, please call the clinic at the number listed above.       Sincerely,      Neelima Patel MD

## 2021-11-04 NOTE — PROGRESS NOTES
"SUBJECTIVE:   Myron Meng is a 85 year old female who presents for Preventive Visit.  Patient comes in with her son-in-law for her physical and med check.  She has a history of coronary artery disease which has been stable and she denies symptoms.  She has type 2 diabetes which is well controlled.  Her last A1c was 7.1%.  She had mild chronic kidney disease and her hypertension is well controlled.  Her last LDL was 54.  She has osteoporosis and a history of cirrhosis with portal hypertension but no ascites.  She has had chronic gastritis and has been on a PPI continually and had a recent EGD on 7/6/2021 for a history of dysphagia.  She saw Dr. Wells from [facility] on 7/12/2021.  She ordered a FibroScan and labs for which I do not see the results.  She does struggle with sleeping as she gets up in the middle of the night to urinate and then cannot fall back asleep.  She is using melatonin.  She has been to the eye doctor and has had problems with her left eye.  It is very irritated and it is watering.  The son-in-law tells me it is not glaucoma.  She is up-to-date with her Covid vaccines and needs her flu shot today.    Patient has been advised of split billing requirements and indicates understanding: Yes   Are you in the first 12 months of your Medicare coverage?  No    Healthy Habits:     In general, how would you rate your overall health?  Good    Frequency of exercise:  None    Do you usually eat at least 4 servings of fruit and vegetables a day, include whole grains    & fiber and avoid regularly eating high fat or \"junk\" foods?  No    Taking medications regularly:  Yes    Medication side effects:  None    Ability to successfully perform activities of daily living:  No assistance needed    Home Safety:  Lack of grab bars in the bathroom    Hearing Impairment:  Difficulty following a conversation in a noisy restaurant or crowded room, need to ask people to speak up or repeat themselves and difficulty " understanding soft or whispered speech    In the past 6 months, have you been bothered by leaking of urine?  No    In general, how would you rate your overall mental or emotional health?  Excellent      PHQ-2 Total Score: 0    Additional concerns today:  No    Do you feel safe in your environment? Yes    Have you ever done Advance Care Planning? (For example, a Health Directive, POLST, or a discussion with a medical provider or your loved ones about your wishes): Yes, patient states has an Advance Care Planning document and will bring a copy to the clinic.       Fall risk  Fallen 2 or more times in the past year?: No  Any fall with injury in the past year?: No    Cognitive Screening   1) Repeat 3 items (Leader, Season, Table)    2) Clock draw: unable to complete   3) 3 item recall: Recalls 3 objects  Results: 3 items recalled: COGNITIVE IMPAIRMENT LESS LIKELY    Mini-CogTM Copyright S Abdiaziz. Licensed by the author for use in [facility]; reprinted with permission (chanelle@[facility]). All rights reserved.      Do you have sleep apnea, excessive snoring or daytime drowsiness?: no    Reviewed and updated as needed this visit by clinical staff  Tobacco   Meds              Reviewed and updated as needed this visit by Provider                Social History     Tobacco Use     Smoking status: Never Smoker     Smokeless tobacco: Never Used   Substance Use Topics     Alcohol use: Never     If you drink alcohol do you typically have >3 drinks per day or >7 drinks per week? No    Alcohol Use 11/4/2021   Prescreen: >3 drinks/day or >7 drinks/week? Not Applicable               Current providers sharing in care for this patient include:   Patient Care Team:  Neelima Patel MD as PCP - General (Family Practice)  Neelima Patel MD as Assigned PCP    The following health maintenance items are reviewed in Epic and correct as of today:  Health Maintenance Due   Topic Date Due     DIABETIC FOOT EXAM  Never done  "    ANNUAL REVIEW OF  ORDERS  Never done     ZOSTER IMMUNIZATION (1 of 2) Never done     INFLUENZA VACCINE (1) 09/01/2021     ADVANCE CARE PLANNING  09/02/2021     FALL RISK ASSESSMENT  11/03/2021     MEDICARE ANNUAL WELLNESS VISIT  11/03/2021       Mammogram Screening: Mammogram Screening - Patient over age 75, has elected to discontinue screenings.    Mammogram Screening - Patient over age 75, has elected to discontinue screenings.  Pertinent mammograms are reviewed under the imaging tab.    Review of Systems  CONSTITUTIONAL: NEGATIVE for fever, chills, change in weight  INTEGUMENTARY/SKIN: NEGATIVE for worrisome rashes, moles or lesions  EYES: POSITIVE for left eye irritation and tearing  ENT/MOUTH: NEGATIVE for ear, mouth and throat problems  RESP: NEGATIVE for significant cough or SOB  BREAST: NEGATIVE for masses, tenderness or discharge  CV: NEGATIVE for chest pain, palpitations or peripheral edema  GI: POSITIVE for dyspepsia, gas or bloating, heartburn or reflux, Hx gastritis and Hx GERD   female: nocturia x 2  MUSCULOSKELETAL: NEGATIVE for significant arthralgias or myalgia  NEURO: NEGATIVE for weakness, dizziness or paresthesias  ENDOCRINE: NEGATIVE for temperature intolerance, skin/hair changes  HEME: NEGATIVE for bleeding problems  PSYCHIATRIC: NEGATIVE for changes in mood or affect    OBJECTIVE:   Pulse 97   Ht 1.549 m (5' 1\")   Wt 46.7 kg (103 lb)   SpO2 98%   BMI 19.46 kg/m   Estimated body mass index is 19.46 kg/m  as calculated from the following:    Height as of this encounter: 1.549 m (5' 1\").    Weight as of this encounter: 46.7 kg (103 lb).  Physical Exam  General appearance - alert, well appearing, and in no distress and normal appearing weight  Mental status - normal mood, behavior, speech, dress, motor activity, and thought processes  Eyes - pupils equal and reactive, extraocular eye movements intact, left eye mildly erythematous with increased tearing  Ears - bilateral TM's and " external ear canals normal  Nose - normal and patent, no erythema, discharge   Mouth - mucous membranes moist, pharynx normal without lesions  Neck - supple, no significant adenopathy  Lymphatics - no palpable lymphadenopathy, no hepatosplenomegaly  Chest - clear to auscultation, no wheezes, rales or rhonchi, symmetric air entry  Heart - normal rate and regular rhythm, S1 and S2 normal, no murmurs noted  Abdomen - soft, nondistended, no masses or organomegaly  tenderness noted in the epigastric area with palpation  Breasts - not examined  Pelvic - exam declined by the patient  Back exam - full range of motion, no tenderness, palpable spasm or pain on motion, no pain with motion noted during exam  Neurological - alert, oriented, normal speech, no focal findings or movement disorder noted, cranial nerves II through XII intact, DTR's normal and symmetric  Musculoskeletal - no joint tenderness, deformity or swelling  Extremities - peripheral pulses normal, no pedal edema, no clubbing or cyanosis  Skin - normal coloration and turgor, no rashes, no suspicious skin lesions noted  Diabetic foot exam shows no deformity of the foot, skin looks soft without any lesions, toenails look great, no neuropathy on filament testing.      Labs reviewed in Epic  Results for orders placed or performed in visit on 11/04/21   Hemoglobin A1c     Status: Abnormal   Result Value Ref Range    Hemoglobin A1C 7.3 (H) 0.0 - 5.6 %   Lactate Dehydrogenase     Status: Normal   Result Value Ref Range    Lactate Dehydrogenase 169 125 - 220 U/L   Basic metabolic panel     Status: Abnormal   Result Value Ref Range    Sodium 141 136 - 145 mmol/L    Potassium 4.1 3.5 - 5.0 mmol/L    Chloride 106 98 - 107 mmol/L    Carbon Dioxide (CO2) 26 22 - 31 mmol/L    Anion Gap 9 5 - 18 mmol/L    Urea Nitrogen 11 8 - 28 mg/dL    Creatinine 0.90 0.60 - 1.10 mg/dL    Calcium 9.4 8.5 - 10.5 mg/dL    Glucose 111 70 - 125 mg/dL    GFR Estimate 59 (L) >60 mL/min/1.73m2        ASSESSMENT / PLAN:     Encounter for Medicare annual wellness exam  COVID-19 immunized, needs influenza vaccine today.    Type 2 diabetes mellitus without complication, without long-term current use of insulin (H)  Well-controlled, will monitor lab.  - Hemoglobin A1c  - Hemoglobin A1c    Atherosclerosis of native coronary artery of native heart without angina pectoris  This has been stable for a long time, patient denies chest pain.    Benign essential hypertension  Well-controlled, will monitor labs.  - Basic metabolic panel  - Basic metabolic panel    Mixed hyperlipidemia  Last LDL was 54.  Will refill her medication today.  - simvastatin (ZOCOR) 20 MG tablet    Stage 3a chronic kidney disease (H)  Very mild, will monitor.  - Basic metabolic panel  - Basic metabolic panel    Gastroesophageal reflux disease without esophagitis  Continued epigastric pain with recent visit to [facility].  EGD without sign of ulcer or cancerous findings.  Will refill her pantoprazole.  - pantoprazole (PROTONIX) 40 MG EC tablet  Dispense: 90 tablet; Refill: 1    Cirrhosis of liver without ascites, unspecified hepatic cirrhosis type (H)  Has seen Dr. Wells from [facility].  Her note talks about a FibroScan but I do not see that it has been done.  Will monitor labs.  - Lactate Dehydrogenase  - Lactate Dehydrogenase    Portal hypertension (H)  Seen on CT.    Age-related osteoporosis without current pathological fracture  Has had good vitamin D levels.    Immunization due  - CO FLU VACCINE, INCREASED ANTIGEN, PRESV FREE ([id number])    I will get back to them on the lab results by my chart and only call with grossly abnormal values.  If all is well she can see me back in 6 months time for her next diabetes check.    Patient has been advised of split billing requirements and indicates understanding: Yes  COUNSELING:  Reviewed preventive health counseling, as reflected in patient instructions    Estimated body mass index is 19.46  "kg/m  as calculated from the following:    Height as of this encounter: 1.549 m (5' 1\").    Weight as of this encounter: 46.7 kg (103 lb).        She reports that she has never smoked. She has never used smokeless tobacco.      Appropriate preventive services were discussed with this patient, including applicable screening as appropriate for cardiovascular disease, diabetes, osteopenia/osteoporosis, and glaucoma.  As appropriate for age/gender, discussed screening for colorectal cancer, prostate cancer, breast cancer, and cervical cancer. Checklist reviewing preventive services available has been given to the patient.    Reviewed patients plan of care and provided an AVS. The Intermediate Care Plan ( asthma action plan, low back pain action plan, and migraine action plan) for Myron meets the Care Plan requirement. This Care Plan has been established and reviewed with the Patient and son.    Counseling Resources:  ATP IV Guidelines  Pooled Cohorts Equation Calculator  Breast Cancer Risk Calculator  Breast Cancer: Medication to Reduce Risk  FRAX Risk Assessment  ICSI Preventive Guidelines  Dietary Guidelines for Americans, 2010  [employer]'s MyPlate  ASA Prophylaxis  Lung CA Screening    Neelima Patel MD  [facility]    Identified Health Risks:    She is at risk for lack of exercise and has been provided with information to increase physical activity for the benefit of her well-being.  The patient was counseled and encouraged to consider modifying their diet and eating habits. She was provided with information on recommended healthy diet options.  The patient was provided with written information regarding signs of hearing loss.  "

## 2021-11-05 LAB
ANION GAP SERPL CALCULATED.3IONS-SCNC: 9 MMOL/L (ref 5–18)
BUN SERPL-MCNC: 11 MG/DL (ref 8–28)
CALCIUM SERPL-MCNC: 9.4 MG/DL (ref 8.5–10.5)
CHLORIDE BLD-SCNC: 106 MMOL/L (ref 98–107)
CO2 SERPL-SCNC: 26 MMOL/L (ref 22–31)
CREAT SERPL-MCNC: 0.9 MG/DL (ref 0.6–1.1)
GFR SERPL CREATININE-BSD FRML MDRD: 59 ML/MIN/1.73M2
GLUCOSE BLD-MCNC: 111 MG/DL (ref 70–125)
LDH SERPL L TO P-CCNC: 169 U/L (ref 125–220)
POTASSIUM BLD-SCNC: 4.1 MMOL/L (ref 3.5–5)
SODIUM SERPL-SCNC: 141 MMOL/L (ref 136–145)

## 2022-02-04 DIAGNOSIS — E11.9 DIABETES (H): ICD-10-CM

## 2022-02-06 NOTE — TELEPHONE ENCOUNTER
"Last Written Prescription Date:  8/8/21  Last Fill Quantity: 360,  # refills: 0   Last office visit provider:  11/4/21     Requested Prescriptions   Pending Prescriptions Disp Refills     metFORMIN (GLUCOPHAGE) 500 MG tablet [Pharmacy Med Name: METFORMIN  MG TABLET] 180 tablet 1     Sig: TAKE 1 TABLET BY MOUTH TWICE A DAY WITH MEALS       Biguanide Agents Passed - 2/4/2022 12:26 AM        Passed - Patient is age 10 or older        Passed - Patient has documented A1c within the specified period of time.     If HgbA1C is 8 or greater, it needs to be on file within the past 3 months.  If less than 8, must be on file within the past 6 months.     Recent Labs   Lab Test 11/04/21  1735   A1C 7.3*             Passed - Patient's CR is NOT>1.4 OR Patient's EGFR is NOT<45 within past 12 mos.     Recent Labs   Lab Test 11/04/21  1735 06/22/21  0834   GFRESTIMATED 59* 53*   GFRESTBLACK  --  >60       Recent Labs   Lab Test 11/04/21  1735   CR 0.90             Passed - Patient does NOT have a diagnosis of CHF.        Passed - Medication is active on med list        Passed - Patient is not pregnant        Passed - Patient has not had a positive pregnancy test within the past 12 mos.         Passed - Recent (6 mo) or future (30 days) visit within the authorizing provider's specialty     Patient had office visit in the last 6 months or has a visit in the next 30 days with authorizing provider or within the authorizing provider's specialty.  See \"Patient Info\" tab in inbasket, or \"Choose Columns\" in Meds & Orders section of the refill encounter.                 Emmanuel Raines RN 02/06/22 1:54 PM  "

## 2022-04-08 ENCOUNTER — IMMUNIZATION (OUTPATIENT)
Dept: NURSING | Facility: CLINIC | Age: 87
End: 2022-04-08
Payer: COMMERCIAL

## 2022-04-08 PROCEDURE — 91305 COVID-19,PF,PFIZER (12+ YRS): CPT

## 2022-04-08 PROCEDURE — 0054A COVID-19,PF,PFIZER (12+ YRS): CPT

## 2022-05-17 ENCOUNTER — TRANSFERRED RECORDS (OUTPATIENT)
Dept: HEALTH INFORMATION MANAGEMENT | Facility: CLINIC | Age: 87
End: 2022-05-17
Payer: COMMERCIAL

## 2022-05-17 LAB — RETINOPATHY: NEGATIVE

## 2022-05-19 ENCOUNTER — OFFICE VISIT (OUTPATIENT)
Dept: FAMILY MEDICINE | Facility: CLINIC | Age: 87
End: 2022-05-19
Payer: COMMERCIAL

## 2022-05-19 VITALS
SYSTOLIC BLOOD PRESSURE: 122 MMHG | WEIGHT: 105.5 LBS | HEART RATE: 65 BPM | BODY MASS INDEX: 19.93 KG/M2 | OXYGEN SATURATION: 98 % | DIASTOLIC BLOOD PRESSURE: 72 MMHG

## 2022-05-19 DIAGNOSIS — M81.0 AGE-RELATED OSTEOPOROSIS WITHOUT CURRENT PATHOLOGICAL FRACTURE: Chronic | ICD-10-CM

## 2022-05-19 DIAGNOSIS — E11.9 TYPE 2 DIABETES MELLITUS WITHOUT COMPLICATION, WITHOUT LONG-TERM CURRENT USE OF INSULIN (H): Primary | Chronic | ICD-10-CM

## 2022-05-19 DIAGNOSIS — K76.6 PORTAL HYPERTENSION (H): Chronic | ICD-10-CM

## 2022-05-19 DIAGNOSIS — I25.10 ATHEROSCLEROSIS OF NATIVE CORONARY ARTERY OF NATIVE HEART WITHOUT ANGINA PECTORIS: Chronic | ICD-10-CM

## 2022-05-19 DIAGNOSIS — N18.31 STAGE 3A CHRONIC KIDNEY DISEASE (H): Chronic | ICD-10-CM

## 2022-05-19 DIAGNOSIS — I10 BENIGN ESSENTIAL HYPERTENSION: Chronic | ICD-10-CM

## 2022-05-19 DIAGNOSIS — E78.2 MIXED HYPERLIPIDEMIA: Chronic | ICD-10-CM

## 2022-05-19 DIAGNOSIS — K74.60 CIRRHOSIS OF LIVER WITHOUT ASCITES, UNSPECIFIED HEPATIC CIRRHOSIS TYPE (H): Chronic | ICD-10-CM

## 2022-05-19 DIAGNOSIS — K21.9 GASTROESOPHAGEAL REFLUX DISEASE WITHOUT ESOPHAGITIS: Chronic | ICD-10-CM

## 2022-05-19 LAB
HBA1C MFR BLD: 7.9 % (ref 0–5.6)
HGB BLD-MCNC: 11.9 G/DL (ref 11.7–15.7)

## 2022-05-19 PROCEDURE — 99214 OFFICE O/P EST MOD 30 MIN: CPT | Performed by: FAMILY MEDICINE

## 2022-05-19 PROCEDURE — 85018 HEMOGLOBIN: CPT | Performed by: FAMILY MEDICINE

## 2022-05-19 PROCEDURE — 80053 COMPREHEN METABOLIC PANEL: CPT | Performed by: FAMILY MEDICINE

## 2022-05-19 PROCEDURE — 83036 HEMOGLOBIN GLYCOSYLATED A1C: CPT | Performed by: FAMILY MEDICINE

## 2022-05-19 PROCEDURE — 36415 COLL VENOUS BLD VENIPUNCTURE: CPT | Performed by: FAMILY MEDICINE

## 2022-05-19 PROCEDURE — 82043 UR ALBUMIN QUANTITATIVE: CPT | Performed by: FAMILY MEDICINE

## 2022-05-19 PROCEDURE — 80061 LIPID PANEL: CPT | Performed by: FAMILY MEDICINE

## 2022-05-19 RX ORDER — PANTOPRAZOLE SODIUM 40 MG/1
40 TABLET, DELAYED RELEASE ORAL DAILY
Qty: 90 TABLET | Refills: 1 | Status: SHIPPED | OUTPATIENT
Start: 2022-05-19 | End: 2022-11-29

## 2022-05-19 RX ORDER — CYCLOSPORINE 0.5 MG/ML
EMULSION OPHTHALMIC
COMMUNITY
Start: 2022-05-17 | End: 2023-06-01

## 2022-05-19 RX ORDER — SIMVASTATIN 20 MG
TABLET ORAL
Qty: 90 TABLET | Refills: 1 | Status: SHIPPED | OUTPATIENT
Start: 2022-05-19 | End: 2022-11-28

## 2022-05-19 RX ORDER — LISINOPRIL 30 MG/1
30 TABLET ORAL DAILY
Qty: 90 TABLET | Refills: 0 | Status: SHIPPED | OUTPATIENT
Start: 2022-05-19 | End: 2022-08-28

## 2022-05-19 RX ORDER — METOPROLOL SUCCINATE 100 MG/1
TABLET, EXTENDED RELEASE ORAL
Qty: 90 TABLET | Refills: 0 | Status: SHIPPED | OUTPATIENT
Start: 2022-05-19 | End: 2022-08-28

## 2022-05-19 RX ORDER — ERYTHROMYCIN 5 MG/G
OINTMENT OPHTHALMIC
COMMUNITY
Start: 2022-05-17 | End: 2023-04-27

## 2022-05-19 ASSESSMENT — PAIN SCALES - GENERAL: PAINLEVEL: NO PAIN (0)

## 2022-05-19 NOTE — PROGRESS NOTES
Type 2 diabetes mellitus without complication, without long-term current use of insulin (H)  Well enough controlled considering her age.  I will monitor her labs and refill her metformin which is the only med she is taking for this indication.  - Hemoglobin A1c  - Albumin Random Urine Quantitative with Creat Ratio  - Hemoglobin A1c  - Albumin Random Urine Quantitative with Creat Ratio  - metFORMIN (GLUCOPHAGE) 500 MG tablet  Dispense: 180 tablet; Refill: 1    Benign essential hypertension  Well-controlled on lisinopril and metoprolol.  Will monitor labs and refill meds.  - Comprehensive metabolic panel (BMP + Alb, Alk Phos, ALT, AST, Total. Bili, TP)  - Comprehensive metabolic panel (BMP + Alb, Alk Phos, ALT, AST, Total. Bili, TP)  - metoprolol succinate ER (TOPROL XL) 100 MG 24 hr tablet  Dispense: 90 tablet; Refill: 0  - lisinopril (ZESTRIL) 30 MG tablet  Dispense: 90 tablet; Refill: 0    Stage 3a chronic kidney disease (H)  Mild with last GFR of 53.  Will monitor labs.  - Hemoglobin  - Comprehensive metabolic panel (BMP + Alb, Alk Phos, ALT, AST, Total. Bili, TP)  - Hemoglobin  - Comprehensive metabolic panel (BMP + Alb, Alk Phos, ALT, AST, Total. Bili, TP)    Mixed hyperlipidemia  Last LDL 54.  Will recheck lab and refill medication accordingly.  - Lipid panel reflex to direct LDL Non-fasting  - Lipid panel reflex to direct LDL Non-fasting  - simvastatin (ZOCOR) 20 MG tablet  Dispense: 90 tablet; Refill: 1    Atherosclerosis of native coronary artery of native heart without angina pectoris  Denies chest pain.  Has not had any active issues in the last couple years.    Cirrhosis of liver without ascites, unspecified hepatic cirrhosis type (H)  Etiology is unclear, but denies symptoms other than she does feel rather bloated at times.  Has followed recently with [facility] in July of last year.  She was supposed to have a FibroScan but I have not seen those results.    Portal hypertension (H)  Seen on  CT.    Gastroesophageal reflux disease without esophagitis  Has had chronic reflux symptoms and has required a PPI.  I will refill her pantoprazole.  - pantoprazole (PROTONIX) 40 MG EC tablet  Dispense: 90 tablet; Refill: 1    Age-related osteoporosis without current pathological fracture  Her need for a PPI does not help and has likely caused some of this issue for the patient.  The good news is that she is very agile when being mobile/ambulating.    I will [facility] and only call with grossly abnormal values.  If all is well she can see me back in 6 months time for annual wellness visit.      Sergo Sanches is a 86 year old who presents for the following health issues  accompanied by her  and son-in-law.    She has a history of type 2 diabetes, hypertension with mild kidney disease, hyperlipidemia and remote history of coronary artery disease, cirrhosis of the liver with portal hypertension and reflux.  She has seen [facility] in the past.  These have been quite stable in the last couple of years.  She also has osteoporosis.  Her son-in-law tells me she recently had her eye exam at [facility].  He feels she has been doing very well as she is still quite mobile and is interested in staying busy with cooking for the family etc.  He tells me she is too fond of sweet things to eat.  Her last A1c was 7.3%.    History of Present Illness       Hypertension: She presents for follow up of hypertension.  She does not check blood pressure  regularly outside of the clinic. Outpatient blood pressures have not been over 140/90. She follows a low salt diet.         Diabetes Follow-up      How often are you checking your blood sugar? Not at all    What concerns do you have today about your diabetes? None     Do you have any of these symptoms? (Select all that apply)  No numbness or tingling in feet.  No redness, sores or blisters on feet.  No complaints of excessive  thirst.  No reports of blurry vision.  No significant changes to weight.    Have you had a diabetic eye exam in the last 12 months? Yes-  Location: last Monday        Hyperlipidemia Follow-Up      Are you regularly taking any medication or supplement to lower your cholesterol?   Yes- simv 20 mg    Are you having muscle aches or other side effects that you think could be caused by your cholesterol lowering medication?  No    Hypertension Follow-up      Do you check your blood pressure regularly outside of the clinic? No     Are you following a low salt diet? No    Are your blood pressures ever more than 140 on the top number (systolic) OR more   than 90 on the bottom number (diastolic), for example 140/90? No    BP Readings from Last 2 Encounters:   05/19/22 122/72   11/04/21 122/76     Hemoglobin A1C POCT (%)   Date Value   12/02/2008 7.9 (H)     Hemoglobin A1C (%)   Date Value   05/19/2022 7.9 (H)   11/04/2021 7.3 (H)     LDL Cholesterol Calculated (mg/dL)   Date Value   05/19/2022 59   03/11/2021 54   12/02/2008 75     LDL Cholesterol Direct   Date Value   08/15/2014 50 mg/dl   01/28/2014 104.6 mg/dL       Vascular Disease Follow-up      How often do you take nitroglycerin? Never    Do you take an aspirin every day? No    Chronic Kidney Disease Follow-up      Do you take any over the counter pain medicine?: No      How many servings of fruits and vegetables do you eat daily?  4 or more    On average, how many sweetened beverages do you drink each day (Examples: soda, juice, sweet tea, etc.  Do NOT count diet or artificially sweetened beverages)?   2    How many days per week do you exercise enough to make your heart beat faster? 3 or less    How many minutes a day do you exercise enough to make your heart beat faster? 9 or less    How many days per week do you miss taking your medication? 0      How many servings of fruits and vegetables do you eat daily?  4 or more    On average, how many sweetened beverages do  you drink each day (Examples: soda, juice, sweet tea, etc.  Do NOT count diet or artificially sweetened beverages)?   3-sweet tea    How many days per week do you exercise enough to make your heart beat faster? 3 or less    How many minutes a day do you exercise enough to make your heart beat faster? 9 or less    How many days per week do you miss taking your medication? 0      Review of Systems   INTEGUMENTARY/SKIN: NEGATIVE for worrisome rashes, moles or lesions  ENT/MOUTH: NEGATIVE for ear, mouth and throat problems  RESP: NEGATIVE for significant cough or SOB  CV: NEGATIVE for chest pain, palpitations or peripheral edema  GI: NEGATIVE for nausea, abdominal pain, heartburn, or change in bowel habits  : NEGATIVE for frequency, dysuria, or hematuria      Objective    /72   Pulse 65   Wt 47.9 kg (105 lb 8 oz)   LMP  (LMP Unknown)   SpO2 98%   Breastfeeding No   BMI 19.93 kg/m    Body mass index is 19.93 kg/m .  Physical Exam   GENERAL APPEARANCE: healthy, alert, no distress and normal weight  RESP: lungs clear to auscultation - no rales, rhonchi or wheezes  CV: regular rates and rhythm, normal S1 S2, no S3 or S4 and no murmur, click or rub  ABDOMEN: soft, nontender, without hepatosplenomegaly or masses and bowel sounds normal  NEURO: Normal strength and tone, mentation intact, speech normal, cranial nerves 2-12 intact and gait stable

## 2022-05-20 LAB
ALBUMIN SERPL-MCNC: 3.5 G/DL (ref 3.5–5)
ALP SERPL-CCNC: 59 U/L (ref 45–120)
ALT SERPL W P-5'-P-CCNC: 24 U/L (ref 0–45)
ANION GAP SERPL CALCULATED.3IONS-SCNC: 9 MMOL/L (ref 5–18)
AST SERPL W P-5'-P-CCNC: 29 U/L (ref 0–40)
BILIRUB SERPL-MCNC: 0.4 MG/DL (ref 0–1)
BUN SERPL-MCNC: 12 MG/DL (ref 8–28)
CALCIUM SERPL-MCNC: 9.6 MG/DL (ref 8.5–10.5)
CHLORIDE BLD-SCNC: 104 MMOL/L (ref 98–107)
CHOLEST SERPL-MCNC: 154 MG/DL
CO2 SERPL-SCNC: 27 MMOL/L (ref 22–31)
CREAT SERPL-MCNC: 1.12 MG/DL (ref 0.6–1.1)
CREAT UR-MCNC: 77 MG/DL
FASTING STATUS PATIENT QL REPORTED: ABNORMAL
GFR SERPL CREATININE-BSD FRML MDRD: 48 ML/MIN/1.73M2
GLUCOSE BLD-MCNC: 226 MG/DL (ref 70–125)
HDLC SERPL-MCNC: 42 MG/DL
LDLC SERPL CALC-MCNC: 59 MG/DL
MICROALBUMIN UR-MCNC: 1.49 MG/DL (ref 0–1.99)
MICROALBUMIN/CREAT UR: 19.4 MG/G CR
POTASSIUM BLD-SCNC: 4.2 MMOL/L (ref 3.5–5)
PROT SERPL-MCNC: 8.5 G/DL (ref 6–8)
SODIUM SERPL-SCNC: 140 MMOL/L (ref 136–145)
TRIGL SERPL-MCNC: 263 MG/DL

## 2022-08-08 ENCOUNTER — TELEPHONE (OUTPATIENT)
Dept: FAMILY MEDICINE | Facility: CLINIC | Age: 87
End: 2022-08-08

## 2022-08-08 ENCOUNTER — TELEPHONE (OUTPATIENT)
Dept: NURSING | Facility: CLINIC | Age: 87
End: 2022-08-08

## 2022-08-08 ENCOUNTER — VIRTUAL VISIT (OUTPATIENT)
Dept: FAMILY MEDICINE | Facility: CLINIC | Age: 87
End: 2022-08-08
Payer: COMMERCIAL

## 2022-08-08 DIAGNOSIS — K74.60 CIRRHOSIS OF LIVER WITHOUT ASCITES, UNSPECIFIED HEPATIC CIRRHOSIS TYPE (H): Chronic | ICD-10-CM

## 2022-08-08 DIAGNOSIS — R54 FRAIL ELDERLY: ICD-10-CM

## 2022-08-08 DIAGNOSIS — N18.31 STAGE 3A CHRONIC KIDNEY DISEASE (H): Chronic | ICD-10-CM

## 2022-08-08 DIAGNOSIS — U07.1 INFECTION DUE TO 2019 NOVEL CORONAVIRUS: Primary | ICD-10-CM

## 2022-08-08 DIAGNOSIS — E11.9 TYPE 2 DIABETES MELLITUS WITHOUT COMPLICATION, WITHOUT LONG-TERM CURRENT USE OF INSULIN (H): Chronic | ICD-10-CM

## 2022-08-08 PROCEDURE — 99442 PR PHYSICIAN TELEPHONE EVALUATION 11-20 MIN: CPT | Mod: 95 | Performed by: FAMILY MEDICINE

## 2022-08-08 NOTE — PROGRESS NOTES
Myron is a 86 year old who is being evaluated via a billable telephone visit.      What phone number would you like to be contacted at? [phone]  How would you like to obtain your AVS? MyChart    Infection due to 2019 novel coronavirus  The COVID Smartset was used and the prescription for dose adjusted Paxlovid was sent to the patient's pharmacy.    Frail elderly  Cirrhosis of liver  Type 2 diabetes  Chronic kidney disease stage IIIa    She can follow-up at her usual diabetes appointment that is scheduled for 10/27/2022.    Subjective   Myron is a 86 year old accompanied by her Son-in-law, presenting for the following health issues:  Covid Concern (Tested positive at home on 8/6/22)    HPI     Son-in-law calls with complaint of the patient testing positive after starting with symptoms 2 days ago.  He called in previously to a phone line and they said she did not need the Paxlovid because she had only mild symptoms.  However, this patient is frail and very medically complicated in that she has cirrhosis of the liver, type 2 diabetes, history of coronary artery disease and chronic kidney disease.  We discussed that it would probably be advantageous to actually have the antiviral medicine.  The medicine will have to be dosed differently due to her chronic kidney disease.  COVID-19 Symptom Review  How many days ago did these symptoms start? 2 days ago--8/6/22    Are any of the following symptoms significant for you?    New or worsening difficulty breathing? No    Worsening cough? No    Fever or chills? No    Headache: No    Sore throat: No    Chest pain: No    Diarrhea: No    Body aches? No    Of note is that patient is very stoic and does not complain of anything.  What treatments has patient tried?  Not needed as of yet  Does patient live in a nursing home, group home, or shelter? No  Does patient have a way to get food/medications during quarantined? Yes, I have a friend or family member who can help  me.              Objective       Vitals:  No vitals were obtained today due to virtual telephone visit.  No  Physical Exam   No exam was done.  This was a telephone note.  I spoke with the son-in-law who acts as .  Patient was answering questions in the background.      Phone call duration: 11 minutes    .  ..

## 2022-08-08 NOTE — TELEPHONE ENCOUNTER
Reason for call:  Other     Patient called regarding (reason for call): prescription    Additional comments: Covid positive on 8/6. Son has been using OTC to help with symptoms. Would like paxlovid.    Phone number to reach patient:  Other phone number:  [phone]    Best Time:  Any    Can we leave a detailed message on this number?  YES

## 2022-08-08 NOTE — PROGRESS NOTES
"Myron is a 86 year old who is being evaluated via a billable video visit.      How would you like to obtain your AVS? MyChart  If the video visit is dropped, the invitation should be resent by: Text to cell phone: [phone]  Will anyone else be joining your video visit? No  {If patient encounters technical issues they should call [phone] :032309}        {PROVIDER CHARTING PREFERENCE:507382}    Subjective   Myron is a 86 year old accompanied by her son in law, presenting for the following health issues:  Covid Concern (Tested positive at home on 8/6/22)      HPI     {SUPERLIST (Optional):129103}  {additonal problems for provider to add (Optional):541789}    Review of Systems   {ROS COMP (Optional):067248}      Objective           Vitals:  No vitals were obtained today due to virtual visit.    Physical Exam   {video visit exam brief selected:418196::\"GENERAL: Healthy, alert and no distress\",\"EYES: Eyes grossly normal to inspection.  No discharge or erythema, or obvious scleral/conjunctival abnormalities.\",\"RESP: No audible wheeze, cough, or visible cyanosis.  No visible retractions or increased work of breathing.  \",\"SKIN: Visible skin clear. No significant rash, abnormal pigmentation or lesions.\",\"NEURO: Cranial nerves grossly intact.  Mentation and speech appropriate for age.\",\"PSYCH: Mentation appears normal, affect normal/bright, judgement and insight intact, normal speech and appearance well-groomed.\"}    {Diagnostic Test Results (Optional):474233}    {AMBULATORY ATTESTATION (Optional):235470}        Video-Visit Details    Video Start Time: {video visit start/end time for provider to select:250874}    Type of service:  Video Visit    Video End Time:{video visit start/end time for provider to select:931816}    Originating Location (pt. Location): {video visit patient location:870462::\"Home\"}    Distant Location (provider location):  [facility]     Platform used for Video " "Visit: {Virtual Visit Platforms:425880::\"Drake\"}    .  ..  "

## 2022-08-08 NOTE — PATIENT INSTRUCTIONS

## 2022-08-08 NOTE — TELEPHONE ENCOUNTER
"Saturday nnight and feverCoronavirus (COVID-19) Notification    Caller Name (Patient, parent, daughter/son, grandparent, etc)  Son in law    Reason for call  Notify of Positive Coronavirus (COVID-19) lab results, assess symptoms,  review [facility] recommendations    Lab Result    Lab test:  2019-nCoV rRt-PCR or SARS-CoV-2 PCR    Oropharyngeal AND/OR nasopharyngeal swabs is POSITIVE for 2019-nCoV RNA/SARS-COV-2 PCR (COVID-19 virus)    Brief introduction script  Introduce self then review script:  \"I am calling on behalf of [facility].  We were notified that your Coronavirus test (COVID-19) for was POSITIVE for the virus.\"    Gather patient reported symptoms   Assessment   Current Symptoms at time of phone call, reported by patient: (if no symptoms, document No symptoms] Fever chills cough,sore throat, bosyt aches   Date of Symptom(s) onset (if applicable) 8/6/2022     If at time of call, Patients symptoms hare worsened, the Patient should contact 911 or have someone drive them to Emergency Dept promptly:      If Patient calling 911, inform 911 personal that you have tested positive for the Coronavirus (COVID-19).  Place mask on and await 911 to arrive.    If Emergency Dept, If possible, please have another adult drive you to the Emergency Dept but you need to wear mask when in contact with other people.      Monoclonal Antibody Administration    You may be eligible to receive a new treatment with a monoclonal antibody for preventing hospitalization in patients at high risk for complications from COVID-19. This medication is still experimental and available on a limited basis; it is given through an IV and must be given at an infusion center. Please note that not all people who are eligible will receive the medication since it is in limited supply.  Is the patient symptomatic and onset of symptoms within the last 7 days?  Yes  Is the patient interested in a visit with a provider to discuss treatment " options?: Yes  Is the patient seen at [facility]?  No: Warm transfer caller to [phone] to be scheduled with a virtual urgent provider.  During transfer, instruct  on appropriate time frame for visit     Review information with Patient    Your result was positive. This means you have COVID-19 (coronavirus).      How can I protect others?    These guidelines are for isolating before returning to work, school or .       If you DO have symptoms:  o Stay home and away from others  - For at least 5 days after your symptoms started, AND   - You are fever free for 24 hours (with no medicine that reduces fever), AND  - Your other symptoms are better.  o Wear a mask for 10 full days any time you are around others.    If you DON'T have symptoms:  o Stay at home and away from others for at least 5 days after your positive test.  o Wear a mask for 10 full days any time you are around others.    There may be different guidelines for healthcare facilities. Please check with the specific sites before arriving.     If you've been told by a doctor that you were severely ill with COVID-19 or are immunocompromised, you should isolate for at least 10 days.    You should not go back to work until you meet the guidelines above for ending your home isolation. You don't need to be retested for COVID-19 before going back to work--studies show that you won't spread the virus if it's been at least 10 days since your symptoms started (or 20 days, if you have a weak immune system).    Employers, schools, and daycares: This is an official notice for this person's medical guidelines for returning in-person. They must meet the above guidelines before going back to work, school, or  in person.    You will receive a positive COVID-19 letter via [employer] or the mail soon with additional self-care information.      Would you like me to review some of that information with you now?  Yes    How can I take care of  myself?      Get lots of rest. Drink extra fluids (unless a doctor has told you not to).      Take Tylenol (acetaminophen) for fever or pain. If you have liver or kidney problems, ask your family doctor if it's okay to take Tylenol.     Take either:     650 mg (two 325 mg pills) every 4 to 6 hours, or     1,000 mg (two 500 mg pills) every 8 hours as needed.     Note: Do not take more than 3,000 mg in one day. Acetaminophen is found in many medicines (both prescribed and over-the-counter medicines). Read all labels to be sure you don't take too much.    For children, check the Tylenol bottle for the right dose (based on their age or weight).      If you have other health problems (like cancer, heart failure, an organ transplant or severe kidney disease): Call your specialty clinic if you don't feel better in the next 2 days.      Know when to call 911: Emergency warning signs include:    Trouble breathing or shortness of breath    Pain or pressure in the chest that doesn't go away    Feeling confused like you haven't felt before, or not being able to wake up    Bluish-colored lips or face        If you were tested for an upcoming procedure, please contact your provider for next steps.     Lauren Perrin RN

## 2022-08-28 DIAGNOSIS — I10 BENIGN ESSENTIAL HYPERTENSION: Chronic | ICD-10-CM

## 2022-08-28 RX ORDER — LISINOPRIL 30 MG/1
TABLET ORAL
Qty: 90 TABLET | Refills: 0 | Status: SHIPPED | OUTPATIENT
Start: 2022-08-28 | End: 2022-11-29

## 2022-08-28 RX ORDER — METOPROLOL SUCCINATE 100 MG/1
TABLET, EXTENDED RELEASE ORAL
Qty: 90 TABLET | Refills: 0 | Status: SHIPPED | OUTPATIENT
Start: 2022-08-28 | End: 2022-11-25

## 2022-08-28 NOTE — TELEPHONE ENCOUNTER
"Routing refill request to provider for review/approval because:  Labs out of range:  cr    Last Written Prescription Date:  5/19/22  Last Fill Quantity: 90,  # refills: 0   Last office visit provider:  8/8/22     Requested Prescriptions   Pending Prescriptions Disp Refills     lisinopril (ZESTRIL) 30 MG tablet [Pharmacy Med Name: LISINOPRIL 30 MG TABLET] 90 tablet 0     Sig: TAKE 1 TABLET BY MOUTH EVERY DAY       ACE Inhibitors (Including Combos) Protocol Failed - 8/28/2022 12:22 AM        Failed - Normal serum creatinine on file in past 12 months     Recent Labs   Lab Test 05/19/22  1725   CR 1.12*       Ok to refill medication if creatinine is low          Passed - Blood pressure under 140/90 in past 12 months     BP Readings from Last 3 Encounters:   05/19/22 122/72   11/04/21 122/76   06/22/21 128/70                 Passed - Recent (12 mo) or future (30 days) visit within the authorizing provider's specialty     Patient has had an office visit with the authorizing provider or a provider within the authorizing providers department within the previous 12 mos or has a future within next 30 days. See \"Patient Info\" tab in inbasket, or \"Choose Columns\" in Meds & Orders section of the refill encounter.              Passed - Medication is active on med list        Passed - Patient is age 18 or older        Passed - No active pregnancy on record        Passed - Normal serum potassium on file in past 12 months     Recent Labs   Lab Test 05/19/22  1725   POTASSIUM 4.2             Passed - No positive pregnancy test within past 12 months           metoprolol succinate ER (TOPROL XL) 100 MG 24 hr tablet [Pharmacy Med Name: METOPROLOL SUCC  MG TAB] 90 tablet 0     Sig: TAKE 1 TABLET BY MOUTH EVERY DAY. **DUE FOR VISIT IN OCTOBER       Beta-Blockers Protocol Passed - 8/28/2022 12:22 AM        Passed - Blood pressure under 140/90 in past 12 months     BP Readings from Last 3 Encounters:   05/19/22 122/72   11/04/21 " "122/76   06/22/21 128/70                 Passed - Patient is age 6 or older        Passed - Recent (12 mo) or future (30 days) visit within the authorizing provider's specialty     Patient has had an office visit with the authorizing provider or a provider within the authorizing providers department within the previous 12 mos or has a future within next 30 days. See \"Patient Info\" tab in inbasket, or \"Choose Columns\" in Meds & Orders section of the refill encounter.              Passed - Medication is active on med list             Krystal Hurtado RN 08/28/22 12:56 PM  "

## 2022-10-22 ENCOUNTER — HEALTH MAINTENANCE LETTER (OUTPATIENT)
Age: 87
End: 2022-10-22

## 2022-10-27 ENCOUNTER — OFFICE VISIT (OUTPATIENT)
Dept: FAMILY MEDICINE | Facility: CLINIC | Age: 87
End: 2022-10-27
Payer: COMMERCIAL

## 2022-10-27 VITALS
HEIGHT: 61 IN | DIASTOLIC BLOOD PRESSURE: 62 MMHG | OXYGEN SATURATION: 100 % | SYSTOLIC BLOOD PRESSURE: 126 MMHG | WEIGHT: 102.3 LBS | RESPIRATION RATE: 12 BRPM | HEART RATE: 69 BPM | BODY MASS INDEX: 19.31 KG/M2

## 2022-10-27 DIAGNOSIS — I10 BENIGN ESSENTIAL HYPERTENSION: Chronic | ICD-10-CM

## 2022-10-27 DIAGNOSIS — E11.9 TYPE 2 DIABETES MELLITUS WITHOUT COMPLICATION, WITHOUT LONG-TERM CURRENT USE OF INSULIN (H): Primary | Chronic | ICD-10-CM

## 2022-10-27 DIAGNOSIS — K21.9 GASTROESOPHAGEAL REFLUX DISEASE WITHOUT ESOPHAGITIS: Chronic | ICD-10-CM

## 2022-10-27 DIAGNOSIS — K74.60 CIRRHOSIS OF LIVER WITHOUT ASCITES, UNSPECIFIED HEPATIC CIRRHOSIS TYPE (H): Chronic | ICD-10-CM

## 2022-10-27 DIAGNOSIS — E78.2 MIXED HYPERLIPIDEMIA: Chronic | ICD-10-CM

## 2022-10-27 DIAGNOSIS — N18.31 STAGE 3A CHRONIC KIDNEY DISEASE (H): Chronic | ICD-10-CM

## 2022-10-27 DIAGNOSIS — I25.10 ATHEROSCLEROSIS OF NATIVE CORONARY ARTERY OF NATIVE HEART WITHOUT ANGINA PECTORIS: Chronic | ICD-10-CM

## 2022-10-27 DIAGNOSIS — Z23 IMMUNIZATION DUE: ICD-10-CM

## 2022-10-27 LAB — HBA1C MFR BLD: 7.4 % (ref 0–5.6)

## 2022-10-27 PROCEDURE — 0124A COVID-19,PF,PFIZER BOOSTER BIVALENT: CPT | Performed by: FAMILY MEDICINE

## 2022-10-27 PROCEDURE — 83036 HEMOGLOBIN GLYCOSYLATED A1C: CPT | Performed by: FAMILY MEDICINE

## 2022-10-27 PROCEDURE — 99214 OFFICE O/P EST MOD 30 MIN: CPT | Mod: 25 | Performed by: FAMILY MEDICINE

## 2022-10-27 PROCEDURE — 99207 PR FOOT EXAM NO CHARGE: CPT | Performed by: FAMILY MEDICINE

## 2022-10-27 PROCEDURE — G0008 ADMIN INFLUENZA VIRUS VAC: HCPCS | Performed by: FAMILY MEDICINE

## 2022-10-27 PROCEDURE — 80048 BASIC METABOLIC PNL TOTAL CA: CPT | Performed by: FAMILY MEDICINE

## 2022-10-27 PROCEDURE — 36415 COLL VENOUS BLD VENIPUNCTURE: CPT | Performed by: FAMILY MEDICINE

## 2022-10-27 PROCEDURE — 91312 COVID-19,PF,PFIZER BOOSTER BIVALENT: CPT | Performed by: FAMILY MEDICINE

## 2022-10-27 PROCEDURE — 90662 IIV NO PRSV INCREASED AG IM: CPT | Performed by: FAMILY MEDICINE

## 2022-10-27 ASSESSMENT — PAIN SCALES - GENERAL: PAINLEVEL: NO PAIN (0)

## 2022-10-27 NOTE — PROGRESS NOTES
Type 2 diabetes mellitus without complication, without long-term current use of insulin (H)  Last A1c was the highest she has had and was 7.9%.  It is thought that this was as high as it was because she was drinking lots of lemonade in the summer.  We will recheck this lab.  I will call to them as soon as I have the result.  - Hemoglobin A1c  - Hemoglobin A1c    Atherosclerosis of native coronary artery of native heart without angina pectoris  This has been very stable, not causing any troubles.    Benign essential hypertension  Well-controlled on current lisinopril 30 mg and metoprolol  mg.    Stage 3a chronic kidney disease (H)  Mild chronic kidney disease will monitor labs.  - Basic metabolic panel  - Basic metabolic panel    Mixed hyperlipidemia  Well-controlled with last LDL of 59 back in May.    Cirrhosis of liver without ascites, unspecified hepatic cirrhosis type (H)  Denies abdominal pain with palpation.  LFTs have been normal.    Gastroesophageal reflux disease without esophagitis  Has been continuously on pantoprazole for control.    Immunization due  Willing to have her COVID-vaccine and flu vaccine.  - COVID-19,PF,PFIZER BOOSTER BIVALENT (12+YRS)  - INFLUENZA, QUAD, HIGH DOSE, PF, 65YR + (FLUZONE HD)    Patient's son-in-law was called with the A1c result which was 7.4%.  I will mail results to them as well when they are complete.  She should follow-up in 6 months time.    Sergo Sanches is a 86 year old accompanied by her son-in-law, presenting for the following health issues:  Diabetes (Diabetic check. Never tests blood sugars at home. )  Patient has a history of type 2 diabetes, remote coronary artery disease, hypertension, mild kidney disease, hyperlipidemia and also a history of cirrhosis of the liver which has been stable.  She has relatively severe reflux if not taking her pantoprazole.    History of Present Illness       Reason for visit:  Follow up    She eats 2-3 servings of  fruits and vegetables daily.She consumes 0 sweetened beverage(s) daily.She exercises with enough effort to increase her heart rate 60 or more minutes per day.  She exercises with enough effort to increase her heart rate 3 or less days per week.   She is taking medications regularly.       Diabetes Follow-up      How often are you checking your blood sugar? Not at all    What concerns do you have today about your diabetes? None     Do you have any of these symptoms? (Select all that apply)  No numbness or tingling in feet.  No redness, sores or blisters on feet.  No complaints of excessive thirst.  No reports of blurry vision.  No significant changes to weight.              Hyperlipidemia Follow-Up      Are you regularly taking any medication or supplement to lower your cholesterol?   Yes- simvastatin    Are you having muscle aches or other side effects that you think could be caused by your cholesterol lowering medication?  No    Hypertension Follow-up      Do you check your blood pressure regularly outside of the clinic? Yes     Are you following a low salt diet? Yes    Are your blood pressures ever more than 140 on the top number (systolic) OR more   than 90 on the bottom number (diastolic), for example 140/90? No    BP Readings from Last 2 Encounters:   10/27/22 126/62   05/19/22 122/72     Hemoglobin A1C (%)   Date Value   10/27/2022 7.4 (H)   05/19/2022 7.9 (H)   12/02/2008 7.9 (H)     LDL Cholesterol Calculated (mg/dL)   Date Value   05/19/2022 59   03/11/2021 54   12/02/2008 75     LDL Cholesterol Direct   Date Value   08/15/2014 50 mg/dl   01/28/2014 104.6 mg/dL       Vascular Disease Follow-up      How often do you take nitroglycerin? Never    Do you take an aspirin every day? No    Chronic Kidney Disease Follow-up      Do you take any over the counter pain medicine?: No-rarely tylenol          Objective    /62 (BP Location: Right arm, Patient Position: Sitting, Cuff Size: Adult Regular)   Pulse 69   " Resp 12   Ht 1.549 m (5' 1\")   Wt 46.4 kg (102 lb 4.8 oz)   LMP  (LMP Unknown)   SpO2 100%   BMI 19.33 kg/m    Body mass index is 19.33 kg/m .  Physical Exam   GENERAL: healthy, alert, no distress, elderly and appears younger than stated age  RESP: lungs clear to auscultation - no rales, rhonchi or wheezes  CV: regular rates and rhythm, no peripheral edema and without murmur  ABDOMEN: soft, nontender and bowel sounds normal  MS: no gross musculoskeletal defects noted, no edema  Diabetic foot exam shows no deformity of the foot, skin and nails are well cared for and without lesion or dry skin, no neuropathy on filament testing.              "

## 2022-10-28 LAB
ANION GAP SERPL CALCULATED.3IONS-SCNC: 9 MMOL/L (ref 7–15)
BUN SERPL-MCNC: 11.5 MG/DL (ref 8–23)
CALCIUM SERPL-MCNC: 9.7 MG/DL (ref 8.8–10.2)
CHLORIDE SERPL-SCNC: 106 MMOL/L (ref 98–107)
CREAT SERPL-MCNC: 1 MG/DL (ref 0.51–0.95)
DEPRECATED HCO3 PLAS-SCNC: 28 MMOL/L (ref 22–29)
GFR SERPL CREATININE-BSD FRML MDRD: 55 ML/MIN/1.73M2
GLUCOSE SERPL-MCNC: 116 MG/DL (ref 70–99)
POTASSIUM SERPL-SCNC: 4.4 MMOL/L (ref 3.4–5.3)
SODIUM SERPL-SCNC: 143 MMOL/L (ref 136–145)

## 2022-11-25 ASSESSMENT — ENCOUNTER SYMPTOMS
ARTHRALGIAS: 0
WEAKNESS: 0
HEMATURIA: 0
PALPITATIONS: 0
NERVOUS/ANXIOUS: 0
SORE THROAT: 0
HEARTBURN: 0
DIZZINESS: 0
DIARRHEA: 0
HEADACHES: 0
CONSTIPATION: 0
NAUSEA: 0
MYALGIAS: 0
EYE PAIN: 0
FREQUENCY: 0
DYSURIA: 0
FEVER: 0
HEMATOCHEZIA: 0
PARESTHESIAS: 0
CHILLS: 0
BREAST MASS: 0
COUGH: 0
ABDOMINAL PAIN: 0
JOINT SWELLING: 0
SHORTNESS OF BREATH: 0

## 2022-11-25 ASSESSMENT — ACTIVITIES OF DAILY LIVING (ADL): CURRENT_FUNCTION: NO ASSISTANCE NEEDED

## 2022-11-28 DIAGNOSIS — E11.9 TYPE 2 DIABETES MELLITUS WITHOUT COMPLICATION, WITHOUT LONG-TERM CURRENT USE OF INSULIN (H): Chronic | ICD-10-CM

## 2022-11-28 DIAGNOSIS — E78.2 MIXED HYPERLIPIDEMIA: Chronic | ICD-10-CM

## 2022-11-28 DIAGNOSIS — K21.9 GASTROESOPHAGEAL REFLUX DISEASE WITHOUT ESOPHAGITIS: Chronic | ICD-10-CM

## 2022-11-28 DIAGNOSIS — I10 BENIGN ESSENTIAL HYPERTENSION: Chronic | ICD-10-CM

## 2022-11-28 RX ORDER — SIMVASTATIN 20 MG
TABLET ORAL
Qty: 90 TABLET | Refills: 1 | Status: SHIPPED | OUTPATIENT
Start: 2022-11-28 | End: 2023-06-01

## 2022-11-29 RX ORDER — PANTOPRAZOLE SODIUM 40 MG/1
TABLET, DELAYED RELEASE ORAL
Qty: 90 TABLET | Refills: 1 | Status: SHIPPED | OUTPATIENT
Start: 2022-11-29 | End: 2024-05-02

## 2022-11-29 RX ORDER — LISINOPRIL 30 MG/1
TABLET ORAL
Qty: 90 TABLET | Refills: 0 | Status: SHIPPED | OUTPATIENT
Start: 2022-11-29 | End: 2023-03-02

## 2022-11-29 NOTE — TELEPHONE ENCOUNTER
"Routing refill request to provider for review/approval because:  Labs out of range:  cr  Needs review    Last Written Prescription Date:  8/28/22  Last Fill Quantity: 90,  # refills: 0   Last office visit provider:  10/27/22     Requested Prescriptions   Pending Prescriptions Disp Refills     pantoprazole (PROTONIX) 40 MG EC tablet [Pharmacy Med Name: PANTOPRAZOLE SOD DR 40 MG TAB] 90 tablet 1     Sig: TAKE 1 TABLET BY MOUTH EVERY DAY       PPI Protocol Failed - 11/28/2022 12:26 AM        Failed - No diagnosis of osteoporosis on record        Passed - Not on Clopidogrel (unless Pantoprazole ordered)        Passed - Recent (12 mo) or future (30 days) visit within the authorizing provider's specialty     Patient has had an office visit with the authorizing provider or a provider within the authorizing providers department within the previous 12 mos or has a future within next 30 days. See \"Patient Info\" tab in inbasket, or \"Choose Columns\" in Meds & Orders section of the refill encounter.              Passed - Medication is active on med list        Passed - Patient is age 18 or older        Passed - No active pregnacy on record        Passed - No positive pregnancy test in past 12 months           metFORMIN (GLUCOPHAGE) 500 MG tablet [Pharmacy Med Name: METFORMIN  MG TABLET] 180 tablet 1     Sig: TAKE 1 TABLET BY MOUTH TWICE A DAY WITH MEALS       Biguanide Agents Passed - 11/28/2022 12:26 AM        Passed - Patient is age 10 or older        Passed - Patient has documented A1c within the specified period of time.     If HgbA1C is 8 or greater, it needs to be on file within the past 3 months.  If less than 8, must be on file within the past 6 months.     Recent Labs   Lab Test 10/27/22  1756   A1C 7.4*             Passed - Patient's CR is NOT>1.4 OR Patient's EGFR is NOT<45 within past 12 mos.     Recent Labs   Lab Test 10/27/22  1756 11/04/21  1735 06/22/21  0834   GFRESTIMATED 55*   < > 53*   GFRESTBLACK  --  " " --  >60    < > = values in this interval not displayed.       Recent Labs   Lab Test 10/27/22  1756   CR 1.00*             Passed - Patient does NOT have a diagnosis of CHF.        Passed - Medication is active on med list        Passed - Patient is not pregnant        Passed - Patient has not had a positive pregnancy test within the past 12 mos.         Passed - Recent (6 mo) or future (30 days) visit within the authorizing provider's specialty     Patient had office visit in the last 6 months or has a visit in the next 30 days with authorizing provider or within the authorizing provider's specialty.  See \"Patient Info\" tab in inbasket, or \"Choose Columns\" in Meds & Orders section of the refill encounter.               simvastatin (ZOCOR) 20 MG tablet [Pharmacy Med Name: SIMVASTATIN 20 MG TABLET] 90 tablet 1     Sig: TAKE 1 TABLET BY MOUTH EVERYDAY AT BEDTIME       Statins Protocol Passed - 11/28/2022 12:26 AM        Passed - LDL on file in past 12 months     Recent Labs   Lab Test 05/19/22  1725   LDL 59             Passed - No abnormal creatine kinase in past 12 months     No lab results found.             Passed - Recent (12 mo) or future (30 days) visit within the authorizing provider's specialty     Patient has had an office visit with the authorizing provider or a provider within the authorizing providers department within the previous 12 mos or has a future within next 30 days. See \"Patient Info\" tab in inbasket, or \"Choose Columns\" in Meds & Orders section of the refill encounter.              Passed - Medication is active on med list        Passed - Patient is age 18 or older        Passed - No active pregnancy on record        Passed - No positive pregnancy test in past 12 months           lisinopril (ZESTRIL) 30 MG tablet [Pharmacy Med Name: LISINOPRIL 30 MG TABLET] 90 tablet 0     Sig: TAKE 1 TABLET BY MOUTH EVERY DAY       ACE Inhibitors (Including Combos) Protocol Failed - 11/28/2022 12:26 AM        " "Failed - Normal serum creatinine on file in past 12 months     Recent Labs   Lab Test 10/27/22  1756   CR 1.00*       Ok to refill medication if creatinine is low          Passed - Blood pressure under 140/90 in past 12 months     BP Readings from Last 3 Encounters:   10/27/22 126/62   05/19/22 122/72   11/04/21 122/76                 Passed - Recent (12 mo) or future (30 days) visit within the authorizing provider's specialty     Patient has had an office visit with the authorizing provider or a provider within the authorizing providers department within the previous 12 mos or has a future within next 30 days. See \"Patient Info\" tab in inbasket, or \"Choose Columns\" in Meds & Orders section of the refill encounter.              Passed - Medication is active on med list        Passed - Patient is age 18 or older        Passed - No active pregnancy on record        Passed - Normal serum potassium on file in past 12 months     Recent Labs   Lab Test 10/27/22  1756   POTASSIUM 4.4             Passed - No positive pregnancy test within past 12 months             Krystal Hurtado, RN 11/28/22 8:14 PM  "

## 2022-12-01 ENCOUNTER — OFFICE VISIT (OUTPATIENT)
Dept: FAMILY MEDICINE | Facility: CLINIC | Age: 87
End: 2022-12-01
Attending: FAMILY MEDICINE
Payer: COMMERCIAL

## 2022-12-01 VITALS
OXYGEN SATURATION: 97 % | HEART RATE: 66 BPM | TEMPERATURE: 98.1 F | BODY MASS INDEX: 19.31 KG/M2 | HEIGHT: 61 IN | SYSTOLIC BLOOD PRESSURE: 126 MMHG | WEIGHT: 102.3 LBS | DIASTOLIC BLOOD PRESSURE: 62 MMHG

## 2022-12-01 DIAGNOSIS — Z00.00 ENCOUNTER FOR MEDICARE ANNUAL WELLNESS EXAM: Primary | ICD-10-CM

## 2022-12-01 DIAGNOSIS — E11.9 TYPE 2 DIABETES MELLITUS WITHOUT COMPLICATION, WITHOUT LONG-TERM CURRENT USE OF INSULIN (H): Chronic | ICD-10-CM

## 2022-12-01 PROCEDURE — G0439 PPPS, SUBSEQ VISIT: HCPCS | Performed by: FAMILY MEDICINE

## 2022-12-01 ASSESSMENT — ENCOUNTER SYMPTOMS
FREQUENCY: 0
HEADACHES: 0
CHILLS: 0
DYSURIA: 0
CONSTIPATION: 0
NERVOUS/ANXIOUS: 0
HEMATURIA: 0
PARESTHESIAS: 0
BREAST MASS: 0
FEVER: 0
MYALGIAS: 0
NAUSEA: 0
HEMATOCHEZIA: 0
HEARTBURN: 0
DIZZINESS: 0
SHORTNESS OF BREATH: 0
SORE THROAT: 0
JOINT SWELLING: 0
ABDOMINAL PAIN: 0
ARTHRALGIAS: 0
DIARRHEA: 0
PALPITATIONS: 0
WEAKNESS: 0
EYE PAIN: 0
COUGH: 0

## 2022-12-01 ASSESSMENT — ACTIVITIES OF DAILY LIVING (ADL): CURRENT_FUNCTION: NO ASSISTANCE NEEDED

## 2022-12-01 ASSESSMENT — PAIN SCALES - GENERAL: PAINLEVEL: NO PAIN (0)

## 2022-12-01 NOTE — PROGRESS NOTES
"SUBJECTIVE:   Myron is a 86 year old who presents for Preventive Visit.  Patient recently had her diabetes check.  She is well controlled for her diabetes, hypertension, hyperlipidemia and reflux.    Patient has been advised of split billing requirements and indicates understanding: Yes  Are you in the first 12 months of your Medicare coverage?  No    Healthy Habits:     In general, how would you rate your overall health?  Good    Frequency of exercise:  None    Do you usually eat at least 4 servings of fruit and vegetables a day, include whole grains    & fiber and avoid regularly eating high fat or \"junk\" foods?  Yes    Taking medications regularly:  Yes    Medication side effects:  None    Ability to successfully perform activities of daily living:  No assistance needed    Home Safety:  No safety concerns identified    Hearing Impairment:  Difficulty following a conversation in a noisy restaurant or crowded room and difficulty following dialogue in the theater    In the past 6 months, have you been bothered by leaking of urine?  No    In general, how would you rate your overall mental or emotional health?  Excellent      PHQ-2 Total Score: 0    Additional concerns today:  No      Have you ever done Advance Care Planning? (For example, a Health Directive, POLST, or a discussion with a medical provider or your loved ones about your wishes): No, advance care planning information given to patient to review.  Patient plans to discuss their wishes with loved ones or provider.         Fallen 2 or more times in the past year?: No  Any fall with injury in the past year?: No    Cognitive Screening   1) Repeat 3 items (Leader, Season, Table)    2) Clock draw: NORMAL  3) 3 item recall: Recalls 3 objects  Results: 3 items recalled: COGNITIVE IMPAIRMENT LESS LIKELY    Mini-CogTM Copyright ANSON Freed. Licensed by the author for use in [facility]; reprinted with permission (chanelle@.[facility]). All rights reserved.  "     Do you have sleep apnea, excessive snoring or daytime drowsiness?: no    Reviewed and updated as needed this visit by clinical staff   Tobacco   Meds              Reviewed and updated as needed this visit by Provider                 Social History     Tobacco Use     Smoking status: Never     Smokeless tobacco: Never   Substance Use Topics     Alcohol use: Never         Alcohol Use 11/25/2022   Prescreen: >3 drinks/day or >7 drinks/week? Not Applicable   Prescreen: >3 drinks/day or >7 drinks/week? -           Current providers sharing in care for this patient include:   Patient Care Team:  Neelima Patel MD as PCP - General (Family Practice)  Neelima Patel MD as Assigned PCP    The following health maintenance items are reviewed in Epic and correct as of today:  Health Maintenance   Topic Date Due     ZOSTER IMMUNIZATION (1 of 2) Never done     HEPATITIS B IMMUNIZATION (1 of 3 - Risk 3-dose series) Never done     A1C  04/27/2023     EYE EXAM  05/17/2023     LIPID  05/19/2023     MICROALBUMIN  05/19/2023     HEMOGLOBIN  05/19/2023     BMP  10/27/2023     DIABETIC FOOT EXAM  10/27/2023     ANNUAL REVIEW OF HM ORDERS  10/27/2023     MEDICARE ANNUAL WELLNESS VISIT  12/01/2023     FALL RISK ASSESSMENT  12/01/2023     ADVANCE CARE PLANNING  12/01/2027     DTAP/TDAP/TD IMMUNIZATION (2 - Td or Tdap) 02/06/2028     PHQ-2 (once per calendar year)  Completed     INFLUENZA VACCINE  Completed     Pneumococcal Vaccine: 65+ Years  Completed     URINALYSIS  Completed     COVID-19 Vaccine  Completed     IPV IMMUNIZATION  Aged Out     MENINGITIS IMMUNIZATION  Aged Out     Labs reviewed in EPIC  BP Readings from Last 3 Encounters:   12/01/22 126/62   10/27/22 126/62   05/19/22 122/72    Wt Readings from Last 3 Encounters:   12/01/22 46.4 kg (102 lb 4.8 oz)   10/27/22 46.4 kg (102 lb 4.8 oz)   05/19/22 47.9 kg (105 lb 8 oz)                  Mammogram Screening: Mammogram Screening - Patient over age 75, has elected  "to discontinue screenings.      Pertinent mammograms are reviewed under the imaging tab.    Review of Systems   Constitutional: Negative for chills and fever.   HENT: Positive for hearing loss. Negative for congestion, ear pain and sore throat.    Eyes: Negative for pain and visual disturbance.   Respiratory: Negative for cough and shortness of breath.    Cardiovascular: Negative for chest pain, palpitations and peripheral edema.   Gastrointestinal: Negative for abdominal pain, constipation, diarrhea, heartburn, hematochezia and nausea.   Breasts:  Negative for tenderness, breast mass and discharge.   Genitourinary: Negative for dysuria, frequency, genital sores, hematuria, pelvic pain, urgency, vaginal bleeding and vaginal discharge.   Musculoskeletal: Negative for arthralgias, joint swelling and myalgias.   Skin: Negative for rash.   Neurological: Negative for dizziness, weakness, headaches and paresthesias.   Psychiatric/Behavioral: Negative for mood changes. The patient is not nervous/anxious.        OBJECTIVE:   /62 (BP Location: Right arm, Patient Position: Sitting, Cuff Size: Adult Regular)   Pulse 66   Temp 98.1  F (36.7  C) (Oral)   Ht 1.549 m (5' 1\")   Wt 46.4 kg (102 lb 4.8 oz)   LMP  (LMP Unknown)   SpO2 97%   BMI 19.33 kg/m   Estimated body mass index is 19.33 kg/m  as calculated from the following:    Height as of this encounter: 1.549 m (5' 1\").    Weight as of this encounter: 46.4 kg (102 lb 4.8 oz).  Physical Exam  General appearance - alert, well appearing, and in no distress and normal appearing weight  Mental status - normal mood, behavior, speech, dress, motor activity, and thought processes  Eyes - pupils equal and reactive, extraocular eye movements intact  Ears - bilateral TM's and external ear canals normal  Nose - normal and patent, no erythema, discharge   Mouth -covered by mask  Neck - supple, no significant adenopathy, carotids upstroke normal bilaterally, no bruits, thyroid " exam: thyroid is normal in size without nodules or tenderness  Chest - clear to auscultation, no wheezes, rales or rhonchi, symmetric air entry  Heart - normal rate and regular rhythm, no murmurs noted  Abdomen - soft, nontender, nondistended, no masses or organomegaly  Breasts - not examined  Pelvic - examination not indicated  Back exam - full range of motion, no tenderness, palpable spasm or pain on motion  Neurological - alert, oriented, normal speech, no focal findings or movement disorder noted, cranial nerves II through XII intact  Musculoskeletal - no joint tenderness, deformity or swelling  Extremities - peripheral pulses normal, no pedal edema, no clubbing or cyanosis  Skin - normal coloration and turgor, no rashes, no suspicious skin lesions noted      Labs reviewed in Epic    ASSESSMENT / PLAN:     Encounter for Medicare annual wellness exam  I feel patient is doing very well.  She has elected to discontinue colonoscopy and mammogram.  She is up-to-date on her immunizations except for the Shingrix which she needs to have in the pharmacy and they are aware.    Type 2 diabetes mellitus without complication, without long-term current use of insulin (H)  We reviewed her most recent set of labs from her diabetes check on 10/27/2022 which were all good/stable.  A1c 7.4% and GFR 55 (which is improved).  Will refill her metformin.  - metFORMIN (GLUCOPHAGE) 500 MG tablet  Dispense: 270 tablet; Refill: 1    Patient should follow-up in springtime for her next diabetes check.    Patient has been advised of split billing requirements and indicates understanding: Yes      COUNSELING:  Reviewed preventive health counseling, as reflected in patient instructions        She reports that she has never smoked. She has never used smokeless tobacco.      Appropriate preventive services were discussed with this patient, including applicable screening as appropriate for cardiovascular disease, diabetes, osteopenia/osteoporosis,  and glaucoma.  As appropriate for age/gender, discussed screening for colorectal cancer, prostate cancer, breast cancer, and cervical cancer. Checklist reviewing preventive services available has been given to the patient.    Reviewed patients plan of care and provided an AVS. The Basic Care Plan (routine screening as documented in Health Maintenance) for Promila meets the Care Plan requirement. This Care Plan has been established and reviewed with the Patient and son.      Neelima Patel MD  [facility]    Identified Health Risks:

## 2022-12-01 NOTE — PATIENT INSTRUCTIONS
Patient Education   Personalized Prevention Plan  You are due for the preventive services outlined below.  Your care team is available to assist you in scheduling these services.  If you have already completed any of these items, please share that information with your care team to update in your medical record.  Health Maintenance Due   Topic Date Due     Zoster (Shingles) Vaccine (1 of 2) Never done     Hepatitis B Vaccine (1 of 3 - Risk 3-dose series) Never done

## 2023-01-31 ENCOUNTER — TELEPHONE (OUTPATIENT)
Dept: FAMILY MEDICINE | Facility: CLINIC | Age: 88
End: 2023-01-31
Payer: COMMERCIAL

## 2023-02-22 ENCOUNTER — OFFICE VISIT (OUTPATIENT)
Dept: FAMILY MEDICINE | Facility: CLINIC | Age: 88
End: 2023-02-22
Payer: COMMERCIAL

## 2023-02-22 ENCOUNTER — ANCILLARY PROCEDURE (OUTPATIENT)
Dept: GENERAL RADIOLOGY | Facility: CLINIC | Age: 88
End: 2023-02-22
Attending: PHYSICIAN ASSISTANT
Payer: COMMERCIAL

## 2023-02-22 VITALS
DIASTOLIC BLOOD PRESSURE: 68 MMHG | SYSTOLIC BLOOD PRESSURE: 140 MMHG | RESPIRATION RATE: 12 BRPM | BODY MASS INDEX: 18.69 KG/M2 | HEIGHT: 61 IN | TEMPERATURE: 98.7 F | OXYGEN SATURATION: 99 % | WEIGHT: 99 LBS | HEART RATE: 98 BPM

## 2023-02-22 DIAGNOSIS — R05.1 ACUTE COUGH: ICD-10-CM

## 2023-02-22 DIAGNOSIS — R01.1 HEART MURMUR: ICD-10-CM

## 2023-02-22 DIAGNOSIS — R52 BODY ACHES: ICD-10-CM

## 2023-02-22 DIAGNOSIS — R50.81 FEVER IN OTHER DISEASES: ICD-10-CM

## 2023-02-22 DIAGNOSIS — R53.83 OTHER FATIGUE: ICD-10-CM

## 2023-02-22 DIAGNOSIS — R52 BODY ACHES: Primary | ICD-10-CM

## 2023-02-22 LAB
ANION GAP SERPL CALCULATED.3IONS-SCNC: 14 MMOL/L (ref 7–15)
BUN SERPL-MCNC: 11.7 MG/DL (ref 8–23)
CALCIUM SERPL-MCNC: 9.4 MG/DL (ref 8.8–10.2)
CHLORIDE SERPL-SCNC: 101 MMOL/L (ref 98–107)
CREAT SERPL-MCNC: 1.01 MG/DL (ref 0.51–0.95)
DEPRECATED HCO3 PLAS-SCNC: 23 MMOL/L (ref 22–29)
ERYTHROCYTE [DISTWIDTH] IN BLOOD BY AUTOMATED COUNT: 16.1 % (ref 10–15)
FLUAV AG SPEC QL IA: NEGATIVE
FLUBV AG SPEC QL IA: NEGATIVE
GFR SERPL CREATININE-BSD FRML MDRD: 54 ML/MIN/1.73M2
GLUCOSE SERPL-MCNC: 233 MG/DL (ref 70–99)
HCT VFR BLD AUTO: 37.3 % (ref 35–47)
HGB BLD-MCNC: 11.7 G/DL (ref 11.7–15.7)
MCH RBC QN AUTO: 26.2 PG (ref 26.5–33)
MCHC RBC AUTO-ENTMCNC: 31.4 G/DL (ref 31.5–36.5)
MCV RBC AUTO: 83 FL (ref 78–100)
PLATELET # BLD AUTO: 265 10E3/UL (ref 150–450)
POTASSIUM SERPL-SCNC: 4.1 MMOL/L (ref 3.4–5.3)
RBC # BLD AUTO: 4.47 10E6/UL (ref 3.8–5.2)
SARS-COV-2 RNA RESP QL NAA+PROBE: NEGATIVE
SODIUM SERPL-SCNC: 138 MMOL/L (ref 136–145)
WBC # BLD AUTO: 5.8 10E3/UL (ref 4–11)

## 2023-02-22 PROCEDURE — 99214 OFFICE O/P EST MOD 30 MIN: CPT | Mod: CS | Performed by: PHYSICIAN ASSISTANT

## 2023-02-22 PROCEDURE — U0003 INFECTIOUS AGENT DETECTION BY NUCLEIC ACID (DNA OR RNA); SEVERE ACUTE RESPIRATORY SYNDROME CORONAVIRUS 2 (SARS-COV-2) (CORONAVIRUS DISEASE [COVID-19]), AMPLIFIED PROBE TECHNIQUE, MAKING USE OF HIGH THROUGHPUT TECHNOLOGIES AS DESCRIBED BY CMS-2020-01-R: HCPCS | Performed by: PHYSICIAN ASSISTANT

## 2023-02-22 PROCEDURE — U0005 INFEC AGEN DETEC AMPLI PROBE: HCPCS | Performed by: PHYSICIAN ASSISTANT

## 2023-02-22 PROCEDURE — 85027 COMPLETE CBC AUTOMATED: CPT | Performed by: PHYSICIAN ASSISTANT

## 2023-02-22 PROCEDURE — 71046 X-RAY EXAM CHEST 2 VIEWS: CPT | Mod: TC | Performed by: RADIOLOGY

## 2023-02-22 PROCEDURE — 87804 INFLUENZA ASSAY W/OPTIC: CPT | Performed by: PHYSICIAN ASSISTANT

## 2023-02-22 PROCEDURE — 36415 COLL VENOUS BLD VENIPUNCTURE: CPT | Performed by: PHYSICIAN ASSISTANT

## 2023-02-22 PROCEDURE — 80048 BASIC METABOLIC PNL TOTAL CA: CPT | Performed by: PHYSICIAN ASSISTANT

## 2023-02-22 ASSESSMENT — ENCOUNTER SYMPTOMS
CONSTIPATION: 0
LIGHT-HEADEDNESS: 0
FREQUENCY: 0
FEVER: 1
HEADACHES: 0
SINUS PAIN: 0
SORE THROAT: 0
CHILLS: 1
DIZZINESS: 0
SHORTNESS OF BREATH: 0
HEARTBURN: 0
NAUSEA: 0
PALPITATIONS: 0
DIARRHEA: 0
DYSURIA: 0
ACTIVITY CHANGE: 1
FATIGUE: 1
VOMITING: 0
EYES NEGATIVE: 1
WHEEZING: 0
APPETITE CHANGE: 1
ABDOMINAL PAIN: 0
COUGH: 1
RHINORRHEA: 0

## 2023-02-22 NOTE — PROGRESS NOTES
Assessment & Plan       ICD-10-CM    1. Body aches  R52 CBC with platelets     Influenza A & B Antigen - Clinic Collect     XR Chest 2 Views     Symptomatic COVID-19 Virus (Coronavirus) by PCR Nose     Basic metabolic panel     CBC with platelets     Basic metabolic panel      2. Fever in other diseases  R50.81 CBC with platelets     Influenza A & B Antigen - Clinic Collect     XR Chest 2 Views     Symptomatic COVID-19 Virus (Coronavirus) by PCR Nose     Basic metabolic panel     CBC with platelets     Basic metabolic panel      3. Acute cough  R05.1 CBC with platelets     Influenza A & B Antigen - Clinic Collect     XR Chest 2 Views     Symptomatic COVID-19 Virus (Coronavirus) by PCR Nose     Basic metabolic panel     CBC with platelets     Basic metabolic panel      4. Heart murmur  R01.1 Echocardiogram Complete      5. Other fatigue  R53.83 Echocardiogram Complete        #1 cough  #2 malaise  #3 nasal congestion  #4 fatigue  #5 low-grade fevers  Symptoms ongoing x1 week.  Tylenol does resolve most of her symptoms.  She is not drinking as much as she typically is and she does have a decreased appetite.  Daughter states that she is prone to pneumonia and she has been coughing the last 2 or 3 days.  Cough has been nonproductive in nature.  Her blood sugars have been well controlled and has not been having any high blood sugars.  She does have chills, low-grade fever, and malaise.  She does not appear toxic today.  Vitals are stable.  We will do further evaluation including a CBC, BMP, chest x-ray, influenza, and COVID test.  She is to continue with over-the-counter cough and cold medications.  Push fluids and get plenty of rest.  Symptoms for prompt reevaluation were discussed.  We will treat labs accordingly.    #6 murmur  #7 fatigue  On exam I do hear a 1-2 out of 6 systolic murmur.  I did look back through her notes and I do not see any mention of a murmur in the past.  We will get her set up for cardiac echo  for further evaluation      No follow-ups on file.    ANGELA Keene [facility] [place]    Subjective   Promila is a 87 year old, presenting for the following health issues:  Flu (Chills, low grade fever, body aches. Low appetite x 1-2 weeks)      Myron is a pleasant 87-year-old female who presents to the clinic today with her daughter for evaluation on 1 week of chills, low-grade fever of 99, cough for the last few days, nasal congestion, body aches, and fatigue.  She has been taking Tylenol.  With Tylenol majority of her symptoms have been improving.  She does have underlying diabetes but her blood sugar has been well controlled she has not had any significant high blood sugars.  She is not having any abdominal pain, nausea, vomiting, diarrhea or constipation.  She denies any urinary frequency urgency or burning with urination.    History of Present Illness       Reason for visit:  Mild chills  low grade fever  Symptom onset:  3-7 days ago  Symptom intensity:  Mild  Symptom progression:  Staying the same  Had these symptoms before:  Yes  Has tried/received treatment for these symptoms:  Yes  Previous treatment was successful:  Yes  Prior treatment description:  Medication  What makes it worse:  Na  What makes it better:  Rest and medicine    She eats 2-3 servings of fruits and vegetables daily.She consumes 2 sweetened beverage(s) daily.She exercises with enough effort to increase her heart rate 9 or less minutes per day.  She exercises with enough effort to increase her heart rate 3 or less days per week.   She is taking medications regularly.           Review of Systems   Constitutional: Positive for activity change, appetite change, chills, fatigue and fever.   HENT: Positive for congestion. Negative for ear discharge, ear pain, postnasal drip, rhinorrhea, sinus pain and sore throat.    Eyes: Negative.    Respiratory: Positive for cough. Negative for shortness of breath and  "wheezing.    Cardiovascular: Negative for chest pain and palpitations.   Gastrointestinal: Negative for abdominal pain, constipation, diarrhea, heartburn, nausea and vomiting.   Genitourinary: Negative for dysuria, frequency and urgency.   Neurological: Negative for dizziness, light-headedness and headaches.            Objective    BP (!) 140/68 (BP Location: Right arm, Patient Position: Sitting, Cuff Size: Adult Regular)   Pulse 98   Temp 98.7  F (37.1  C) (Oral)   Resp 12   Ht 1.549 m (5' 1\")   Wt 44.9 kg (99 lb)   LMP  (LMP Unknown)   SpO2 99%   BMI 18.71 kg/m    Body mass index is 18.71 kg/m .  Physical Exam  Vitals and nursing note reviewed.   Constitutional:       General: She is not in acute distress.     Appearance: Normal appearance. She is not toxic-appearing or diaphoretic.   HENT:      Head: Normocephalic and atraumatic.      Right Ear: Tympanic membrane, ear canal and external ear normal.      Left Ear: Tympanic membrane, ear canal and external ear normal.      Mouth/Throat:      Lips: Pink.      Mouth: Mucous membranes are moist.      Pharynx: Oropharynx is clear.      Tonsils: No tonsillar exudate or tonsillar abscesses. 0 on the right. 0 on the left.   Eyes:      General: Lids are normal.         Right eye: No discharge.         Left eye: No discharge.      Conjunctiva/sclera: Conjunctivae normal.   Neck:      Trachea: Trachea normal.   Cardiovascular:      Rate and Rhythm: Normal rate and regular rhythm.      Heart sounds: Murmur heard.    Systolic murmur is present with a grade of 1/6.    No friction rub. No gallop.   Pulmonary:      Effort: Pulmonary effort is normal. No accessory muscle usage, prolonged expiration or respiratory distress.      Breath sounds: Examination of the right-lower field reveals decreased breath sounds. Decreased breath sounds present. No wheezing, rhonchi or rales.   Abdominal:      General: Abdomen is flat. Bowel sounds are normal.      Palpations: Abdomen is " soft.      Tenderness: There is no abdominal tenderness. There is no guarding or rebound.   Musculoskeletal:      Cervical back: Full passive range of motion without pain and neck supple.      Right lower leg: No edema.      Left lower leg: No edema.   Lymphadenopathy:      Cervical: No cervical adenopathy.   Skin:     General: Skin is warm and dry.      Findings: No rash.   Neurological:      General: No focal deficit present.      Mental Status: She is alert.      Cranial Nerves: No cranial nerve deficit.      Motor: No weakness.      Gait: Gait is intact.

## 2023-02-23 ENCOUNTER — TELEPHONE (OUTPATIENT)
Dept: FAMILY MEDICINE | Facility: CLINIC | Age: 88
End: 2023-02-23
Payer: COMMERCIAL

## 2023-02-23 NOTE — TELEPHONE ENCOUNTER
----- Message from Dale Jackman PA-C sent at 2/22/2023  3:41 PM CST -----  Please call patient with labs,    All of her labs look stable.  White blood count does not show any evidence of infection.  Kidney function is stable.  Glucose is mildly elevated at 233 make sure she is taking her diabetic medications.  Chest x-ray did not show any evidence of infection.  Due to the new murmur noted we will get a cardiac echo set up.  Continue with Tylenol.  Pushing fluids and get plenty of rest.  Follow-up if symptoms are not improving in the next 3 to 5 days or if symptoms worsen

## 2023-02-23 NOTE — TELEPHONE ENCOUNTER
Left message to call back for: Promila  Information to relay to patient: please relay message below.

## 2023-03-16 ENCOUNTER — HOSPITAL ENCOUNTER (OUTPATIENT)
Dept: CARDIOLOGY | Facility: CLINIC | Age: 88
Discharge: HOME | End: 2023-03-16
Attending: PHYSICIAN ASSISTANT | Admitting: PHYSICIAN ASSISTANT
Payer: COMMERCIAL

## 2023-03-16 DIAGNOSIS — R01.1 HEART MURMUR: ICD-10-CM

## 2023-03-16 DIAGNOSIS — R53.83 OTHER FATIGUE: ICD-10-CM

## 2023-03-16 LAB — LVEF ECHO: NORMAL

## 2023-03-16 PROCEDURE — 93306 TTE W/DOPPLER COMPLETE: CPT | Mod: 26 | Performed by: INTERNAL MEDICINE

## 2023-03-16 PROCEDURE — 255N000002 HC RX 255 OP 636: Performed by: PHYSICIAN ASSISTANT

## 2023-03-16 RX ADMIN — PERFLUTREN 2 ML: 6.52 INJECTION, SUSPENSION INTRAVENOUS at 11:32

## 2023-04-27 ENCOUNTER — OFFICE VISIT (OUTPATIENT)
Dept: FAMILY MEDICINE | Facility: CLINIC | Age: 88
End: 2023-04-27
Attending: FAMILY MEDICINE
Payer: COMMERCIAL

## 2023-04-27 VITALS
BODY MASS INDEX: 18.26 KG/M2 | WEIGHT: 96.7 LBS | RESPIRATION RATE: 10 BRPM | DIASTOLIC BLOOD PRESSURE: 64 MMHG | TEMPERATURE: 98.2 F | OXYGEN SATURATION: 99 % | HEART RATE: 58 BPM | SYSTOLIC BLOOD PRESSURE: 132 MMHG | HEIGHT: 61 IN

## 2023-04-27 DIAGNOSIS — E11.9 TYPE 2 DIABETES MELLITUS WITHOUT COMPLICATION, WITHOUT LONG-TERM CURRENT USE OF INSULIN (H): Primary | Chronic | ICD-10-CM

## 2023-04-27 DIAGNOSIS — E78.2 MIXED HYPERLIPIDEMIA: Chronic | ICD-10-CM

## 2023-04-27 DIAGNOSIS — K76.6 PORTAL HYPERTENSION (H): Chronic | ICD-10-CM

## 2023-04-27 DIAGNOSIS — I10 BENIGN ESSENTIAL HYPERTENSION: Chronic | ICD-10-CM

## 2023-04-27 DIAGNOSIS — N18.31 STAGE 3A CHRONIC KIDNEY DISEASE (H): Chronic | ICD-10-CM

## 2023-04-27 DIAGNOSIS — Z23 IMMUNIZATION DUE: ICD-10-CM

## 2023-04-27 DIAGNOSIS — I25.10 ATHEROSCLEROSIS OF NATIVE CORONARY ARTERY OF NATIVE HEART WITHOUT ANGINA PECTORIS: Chronic | ICD-10-CM

## 2023-04-27 DIAGNOSIS — M81.0 AGE-RELATED OSTEOPOROSIS WITHOUT CURRENT PATHOLOGICAL FRACTURE: Chronic | ICD-10-CM

## 2023-04-27 DIAGNOSIS — K74.60 CIRRHOSIS OF LIVER WITHOUT ASCITES, UNSPECIFIED HEPATIC CIRRHOSIS TYPE (H): Chronic | ICD-10-CM

## 2023-04-27 LAB — HBA1C MFR BLD: 7.2 % (ref 0–5.6)

## 2023-04-27 PROCEDURE — 83036 HEMOGLOBIN GLYCOSYLATED A1C: CPT | Performed by: FAMILY MEDICINE

## 2023-04-27 PROCEDURE — 82043 UR ALBUMIN QUANTITATIVE: CPT | Performed by: FAMILY MEDICINE

## 2023-04-27 PROCEDURE — 82570 ASSAY OF URINE CREATININE: CPT | Performed by: FAMILY MEDICINE

## 2023-04-27 PROCEDURE — 36415 COLL VENOUS BLD VENIPUNCTURE: CPT | Performed by: FAMILY MEDICINE

## 2023-04-27 PROCEDURE — G0009 ADMIN PNEUMOCOCCAL VACCINE: HCPCS | Performed by: FAMILY MEDICINE

## 2023-04-27 PROCEDURE — 80061 LIPID PANEL: CPT | Performed by: FAMILY MEDICINE

## 2023-04-27 PROCEDURE — 90677 PCV20 VACCINE IM: CPT | Performed by: FAMILY MEDICINE

## 2023-04-27 PROCEDURE — 99214 OFFICE O/P EST MOD 30 MIN: CPT | Mod: 25 | Performed by: FAMILY MEDICINE

## 2023-04-27 PROCEDURE — 80053 COMPREHEN METABOLIC PANEL: CPT | Performed by: FAMILY MEDICINE

## 2023-04-27 ASSESSMENT — PAIN SCALES - GENERAL: PAINLEVEL: NO PAIN (0)

## 2023-04-27 NOTE — PROGRESS NOTES
Type 2 diabetes mellitus without complication, without long-term current use of insulin (H)  Typically well controlled with last A1c of 7.4%.  She is only on metformin.  Will monitor labs.  - Hemoglobin A1c  - Albumin Random Urine Quantitative with Creat Ratio  - Hemoglobin A1c  - Albumin Random Urine Quantitative with Creat Ratio    Atherosclerosis of native coronary artery of native heart without angina pectoris  Denies chest pain.  Has not had a problem with this for a very long time.    Benign essential hypertension  Well-controlled on metoprolol succinate 100 mg and lisinopril 30 mg.  Will monitor labs.  - Comprehensive metabolic panel (BMP + Alb, Alk Phos, ALT, AST, Total. Bili, TP)  - Comprehensive metabolic panel (BMP + Alb, Alk Phos, ALT, AST, Total. Bili, TP)    Stage 3a chronic kidney disease (H)  Last creatinine was 1.1 which is her usual baseline.  GFR of 55.  Will monitor.  - Comprehensive metabolic panel (BMP + Alb, Alk Phos, ALT, AST, Total. Bili, TP)  - Comprehensive metabolic panel (BMP + Alb, Alk Phos, ALT, AST, Total. Bili, TP)    Mixed hyperlipidemia  On simvastatin 20 mg, will monitor labs.  - Lipid panel reflex to direct LDL Non-fasting  - Lipid panel reflex to direct LDL Non-fasting    Cirrhosis of liver without ascites, unspecified hepatic cirrhosis type (H)  Has had evaluation for this with CT and labs.  Not problematic for her.    Portal hypertension (H)  Seen on CT.    Age-related osteoporosis without current pathological fracture  Does not actively treat.  Has not had fall in the last year.    Immunization due  Discussed that she is also due for shingles and tetanus vaccine which is free at the pharmacy.  - PNEUMOCOCCAL 20 VALENT CONJUGATE (PREVNAR 20)    I will get back to them on her lab results by [facility] and only call with grossly abnormal values.  She can be seen back in 6 months for her med check.      Sergo Sanches is a 87 year old, presenting for the following health  "issues:  Medication Update (Med check. No concerns. ) and Diabetes (Diabetic check. No concerns.)        4/27/2023     5:15 PM   Additional Questions   Roomed by Sherrie Lisa   Accompanied by Son in reena Johnson     History of Present Illness       Diabetes:   She presents for follow up of diabetes.  She is not checking blood glucose. She has no concerns regarding her diabetes at this time.  She is not experiencing numbness or burning in feet, excessive thirst, blurry vision, weight changes or redness, sores or blisters on feet. The patient has not had a diabetic eye exam in the last 12 months.         She eats 2-3 servings of fruits and vegetables daily.She consumes 2 sweetened beverage(s) daily.She exercises with enough effort to increase her heart rate 9 or less minutes per day.  She exercises with enough effort to increase her heart rate 3 or less days per week.   She is taking medications regularly.    Patient has type 2 diabetes with a history of cirrhosis and portal hypertension.  She also has coronary artery disease, hyperlipidemia and mild chronic kidney disease.  She has had severe reflux in the past and continues on a PPI.  She is near due on her diabetic eye exam.  She is due for her pneumonia, shingles and tetanus vaccines.  She recently had a visit with Audi mar in late February.  She was feeling quite sick with fever etc.  Audi heard a murmur and sent her about 2 weeks later for an echocardiogram.  The only thing it saw was aortic sclerosis.  There was no sign of heart failure.        Objective    /64 (BP Location: Left arm, Patient Position: Sitting, Cuff Size: Adult Regular)   Pulse 58   Temp 98.2  F (36.8  C) (Oral)   Resp 10   Ht 1.543 m (5' 0.75\")   Wt 43.9 kg (96 lb 11.2 oz)   LMP  (LMP Unknown)   SpO2 99%   Breastfeeding No   BMI 18.42 kg/m    Body mass index is 18.42 kg/m .  Physical Exam   GENERAL: healthy, alert, no distress and elderly  RESP: lungs clear to auscultation - " no rales, rhonchi or wheezes  CV: regular rates and rhythm and with small  murmur currently 1 out of 6  ABDOMEN: soft, nontender  MS: no gross musculoskeletal defects noted, no edema

## 2023-04-28 LAB
ALBUMIN SERPL BCG-MCNC: 3.8 G/DL (ref 3.5–5.2)
ALP SERPL-CCNC: 65 U/L (ref 35–104)
ALT SERPL W P-5'-P-CCNC: 25 U/L (ref 10–35)
ANION GAP SERPL CALCULATED.3IONS-SCNC: 12 MMOL/L (ref 7–15)
AST SERPL W P-5'-P-CCNC: 35 U/L (ref 10–35)
BILIRUB SERPL-MCNC: 0.3 MG/DL
BUN SERPL-MCNC: 13 MG/DL (ref 8–23)
CALCIUM SERPL-MCNC: 9.7 MG/DL (ref 8.8–10.2)
CHLORIDE SERPL-SCNC: 103 MMOL/L (ref 98–107)
CHOLEST SERPL-MCNC: 164 MG/DL
CREAT SERPL-MCNC: 0.96 MG/DL (ref 0.51–0.95)
CREAT UR-MCNC: 56.9 MG/DL
DEPRECATED HCO3 PLAS-SCNC: 25 MMOL/L (ref 22–29)
GFR SERPL CREATININE-BSD FRML MDRD: 57 ML/MIN/1.73M2
GLUCOSE SERPL-MCNC: 200 MG/DL (ref 70–99)
HDLC SERPL-MCNC: 43 MG/DL
LDLC SERPL CALC-MCNC: 65 MG/DL
MICROALBUMIN UR-MCNC: <12 MG/L
MICROALBUMIN/CREAT UR: NORMAL MG/G{CREAT}
NONHDLC SERPL-MCNC: 121 MG/DL
POTASSIUM SERPL-SCNC: 4.4 MMOL/L (ref 3.4–5.3)
PROT SERPL-MCNC: 7.9 G/DL (ref 6.4–8.3)
SODIUM SERPL-SCNC: 140 MMOL/L (ref 136–145)
TRIGL SERPL-MCNC: 281 MG/DL

## 2023-06-01 DIAGNOSIS — H04.123 DRY EYES: Primary | ICD-10-CM

## 2023-06-01 DIAGNOSIS — E78.2 MIXED HYPERLIPIDEMIA: Chronic | ICD-10-CM

## 2023-06-01 RX ORDER — CYCLOSPORINE 0.5 MG/ML
EMULSION OPHTHALMIC
Qty: 180 ML | Refills: 1 | Status: SHIPPED | OUTPATIENT
Start: 2023-06-01 | End: 2024-05-02

## 2023-06-01 RX ORDER — SIMVASTATIN 20 MG
TABLET ORAL
Qty: 90 TABLET | Refills: 3 | Status: SHIPPED | OUTPATIENT
Start: 2023-06-01 | End: 2024-05-02

## 2023-06-02 NOTE — TELEPHONE ENCOUNTER
"Last Written Prescription Date:  11/28/22  Last Fill Quantity: 90,  # refills: 1   Last office visit provider:  4/27/23     Requested Prescriptions   Pending Prescriptions Disp Refills     simvastatin (ZOCOR) 20 MG tablet [Pharmacy Med Name: SIMVASTATIN 20 MG TABLET] 90 tablet 1     Sig: TAKE 1 TABLET BY MOUTH EVERYDAY AT BEDTIME       Statins Protocol Passed - 6/1/2023  2:42 AM        Passed - LDL on file in past 12 months     Recent Labs   Lab Test 04/27/23  1743   LDL 65             Passed - No abnormal creatine kinase in past 12 months     No lab results found.             Passed - Recent (12 mo) or future (30 days) visit within the authorizing provider's specialty     Patient has had an office visit with the authorizing provider or a provider within the authorizing providers department within the previous 12 mos or has a future within next 30 days. See \"Patient Info\" tab in inbasket, or \"Choose Columns\" in Meds & Orders section of the refill encounter.              Passed - Medication is active on med list        Passed - Patient is age 18 or older        Passed - No active pregnancy on record        Passed - No positive pregnancy test in past 12 months             Krystal Hurtado, RN 06/01/23 11:37 PM  " Yes

## 2023-06-15 DIAGNOSIS — E11.9 TYPE 2 DIABETES MELLITUS WITHOUT COMPLICATION, WITHOUT LONG-TERM CURRENT USE OF INSULIN (H): Chronic | ICD-10-CM

## 2023-06-15 NOTE — TELEPHONE ENCOUNTER
"Last Written Prescription Date:  12/1/22  Last Fill Quantity: 270,  # refills: 1   Last office visit provider:  4/27/23     Requested Prescriptions   Pending Prescriptions Disp Refills     metFORMIN (GLUCOPHAGE) 500 MG tablet [Pharmacy Med Name: METFORMIN  MG TABLET] 270 tablet 1     Sig: TAKE 2 TABLETS BY MOUTH 2 TIMES DAILY WITH MEALS       Biguanide Agents Passed - 6/15/2023 12:44 AM        Passed - Patient is age 10 or older        Passed - Patient has documented A1c within the specified period of time.     If HgbA1C is 8 or greater, it needs to be on file within the past 3 months.  If less than 8, must be on file within the past 6 months.     Recent Labs   Lab Test 04/27/23 1743   A1C 7.2*             Passed - Patient's CR is NOT>1.4 OR Patient's EGFR is NOT<45 within past 12 mos.     Recent Labs   Lab Test 04/27/23  1743 11/04/21  1735 06/22/21  0834   GFRESTIMATED 57*   < > 53*   GFRESTBLACK  --   --  >60    < > = values in this interval not displayed.       Recent Labs   Lab Test 04/27/23 1743   CR 0.96*             Passed - Patient does NOT have a diagnosis of CHF.        Passed - Medication is active on med list        Passed - Patient is not pregnant        Passed - Patient has not had a positive pregnancy test within the past 12 mos.         Passed - Recent (6 mo) or future (30 days) visit within the authorizing provider's specialty     Patient had office visit in the last 6 months or has a visit in the next 30 days with authorizing provider or within the authorizing provider's specialty.  See \"Patient Info\" tab in inbasket, or \"Choose Columns\" in Meds & Orders section of the refill encounter.                 Krystal Hurtado RN 06/15/23 3:45 PM  "

## 2023-09-09 DIAGNOSIS — I10 BENIGN ESSENTIAL HYPERTENSION: Chronic | ICD-10-CM

## 2023-09-09 DIAGNOSIS — E11.9 TYPE 2 DIABETES MELLITUS WITHOUT COMPLICATION, WITHOUT LONG-TERM CURRENT USE OF INSULIN (H): Chronic | ICD-10-CM

## 2023-09-09 RX ORDER — METOPROLOL SUCCINATE 100 MG/1
TABLET, EXTENDED RELEASE ORAL
Qty: 90 TABLET | Refills: 2 | Status: SHIPPED | OUTPATIENT
Start: 2023-09-09 | End: 2024-05-02

## 2023-09-09 NOTE — TELEPHONE ENCOUNTER
"Last Written Prescription Date:  6/15/23  Last Fill Quantity: 360,  # refills: 0   Last office visit provider:  4/27/23     Last Written Prescription Date:  3/2/23  Last Fill Quantity: 90,  # refills: 1   Last office visit provider:  4/27/23     Requested Prescriptions   Pending Prescriptions Disp Refills    lisinopril (ZESTRIL) 30 MG tablet [Pharmacy Med Name: LISINOPRIL 30 MG TABLET] 90 tablet 1     Sig: TAKE 1 TABLET BY MOUTH EVERY DAY       ACE Inhibitors (Including Combos) Protocol Failed - 9/9/2023  1:40 AM        Failed - Normal serum creatinine on file in past 12 months     Recent Labs   Lab Test 04/27/23  1743   CR 0.96*       Ok to refill medication if creatinine is low          Passed - Blood pressure under 140/90 in past 12 months     BP Readings from Last 3 Encounters:   04/27/23 132/64   02/22/23 (!) 140/68   12/01/22 126/62                 Passed - Recent (12 mo) or future (30 days) visit within the authorizing provider's specialty     Patient has had an office visit with the authorizing provider or a provider within the authorizing providers department within the previous 12 mos or has a future within next 30 days. See \"Patient Info\" tab in inbasket, or \"Choose Columns\" in Meds & Orders section of the refill encounter.              Passed - Medication is active on med list        Passed - Patient is age 18 or older        Passed - No active pregnancy on record        Passed - Normal serum potassium on file in past 12 months     Recent Labs   Lab Test 04/27/23 1743   POTASSIUM 4.4             Passed - No positive pregnancy test within past 12 months          metoprolol succinate ER (TOPROL XL) 100 MG 24 hr tablet [Pharmacy Med Name: METOPROLOL SUCC  MG TAB] 90 tablet 1     Sig: TAKE 1 TABLET BY MOUTH EVERY DAY       Beta-Blockers Protocol Passed - 9/9/2023  1:40 AM        Passed - Blood pressure under 140/90 in past 12 months     BP Readings from Last 3 Encounters:   04/27/23 132/64   02/22/23 " "(!) 140/68   12/01/22 126/62                 Passed - Patient is age 6 or older        Passed - Recent (12 mo) or future (30 days) visit within the authorizing provider's specialty     Patient has had an office visit with the authorizing provider or a provider within the authorizing providers department within the previous 12 mos or has a future within next 30 days. See \"Patient Info\" tab in inbasket, or \"Choose Columns\" in Meds & Orders section of the refill encounter.              Passed - Medication is active on med list          metFORMIN (GLUCOPHAGE) 500 MG tablet [Pharmacy Med Name: METFORMIN  MG TABLET] 360 tablet 0     Sig: TAKE 2 TABLETS BY MOUTH TWICE A DAY WITH MEALS       Biguanide Agents Passed - 9/9/2023  1:40 AM        Passed - Patient is age 10 or older        Passed - Patient has documented A1c within the specified period of time.     If HgbA1C is 8 or greater, it needs to be on file within the past 3 months.  If less than 8, must be on file within the past 6 months.     Recent Labs   Lab Test 04/27/23  1743   A1C 7.2*             Passed - Patient's CR is NOT>1.4 OR Patient's EGFR is NOT<45 within past 12 mos.     Recent Labs   Lab Test 04/27/23  1743 11/04/21  1735 06/22/21  0834   GFRESTIMATED 57*   < > 53*   GFRESTBLACK  --   --  >60    < > = values in this interval not displayed.       Recent Labs   Lab Test 04/27/23  1743   CR 0.96*             Passed - Patient does NOT have a diagnosis of CHF.        Passed - Medication is active on med list        Passed - Patient is not pregnant        Passed - Patient has not had a positive pregnancy test within the past 12 mos.         Passed - Recent (6 mo) or future (30 days) visit within the authorizing provider's specialty     Patient had office visit in the last 6 months or has a visit in the next 30 days with authorizing provider or within the authorizing provider's specialty.  See \"Patient Info\" tab in inbasket, or \"Choose Columns\" in Meds & " Orders section of the refill encounter.                 Moira Marcano RN 09/09/23 1:40 AM

## 2023-09-09 NOTE — TELEPHONE ENCOUNTER
"Routing refill request to provider for review/approval because:  Labs out of range:  Cr    Last Written Prescription Date:  3/2/23  Last Fill Quantity: 90,  # refills: 1   Last office visit provider:  4/27/23     Requested Prescriptions   Pending Prescriptions Disp Refills    lisinopril (ZESTRIL) 30 MG tablet [Pharmacy Med Name: LISINOPRIL 30 MG TABLET] 90 tablet 1     Sig: TAKE 1 TABLET BY MOUTH EVERY DAY       ACE Inhibitors (Including Combos) Protocol Failed - 9/9/2023  1:40 AM        Failed - Normal serum creatinine on file in past 12 months     Recent Labs   Lab Test 04/27/23  1743   CR 0.96*       Ok to refill medication if creatinine is low          Passed - Blood pressure under 140/90 in past 12 months     BP Readings from Last 3 Encounters:   04/27/23 132/64   02/22/23 (!) 140/68   12/01/22 126/62                 Passed - Recent (12 mo) or future (30 days) visit within the authorizing provider's specialty     Patient has had an office visit with the authorizing provider or a provider within the authorizing providers department within the previous 12 mos or has a future within next 30 days. See \"Patient Info\" tab in inbasket, or \"Choose Columns\" in Meds & Orders section of the refill encounter.              Passed - Medication is active on med list        Passed - Patient is age 18 or older        Passed - No active pregnancy on record        Passed - Normal serum potassium on file in past 12 months     Recent Labs   Lab Test 04/27/23 1743   POTASSIUM 4.4             Passed - No positive pregnancy test within past 12 months         Signed Prescriptions Disp Refills    metoprolol succinate ER (TOPROL XL) 100 MG 24 hr tablet 90 tablet 2     Sig: TAKE 1 TABLET BY MOUTH EVERY DAY       Beta-Blockers Protocol Passed - 9/9/2023  1:40 AM        Passed - Blood pressure under 140/90 in past 12 months     BP Readings from Last 3 Encounters:   04/27/23 132/64   02/22/23 (!) 140/68   12/01/22 126/62                 " "Passed - Patient is age 6 or older        Passed - Recent (12 mo) or future (30 days) visit within the authorizing provider's specialty     Patient has had an office visit with the authorizing provider or a provider within the authorizing providers department within the previous 12 mos or has a future within next 30 days. See \"Patient Info\" tab in inbasket, or \"Choose Columns\" in Meds & Orders section of the refill encounter.              Passed - Medication is active on med list          metFORMIN (GLUCOPHAGE) 500 MG tablet 360 tablet 0     Sig: TAKE 2 TABLETS BY MOUTH TWICE A DAY WITH MEALS       Biguanide Agents Passed - 9/9/2023  1:40 AM        Passed - Patient is age 10 or older        Passed - Patient has documented A1c within the specified period of time.     If HgbA1C is 8 or greater, it needs to be on file within the past 3 months.  If less than 8, must be on file within the past 6 months.     Recent Labs   Lab Test 04/27/23  1743   A1C 7.2*             Passed - Patient's CR is NOT>1.4 OR Patient's EGFR is NOT<45 within past 12 mos.     Recent Labs   Lab Test 04/27/23  1743 11/04/21  1735 06/22/21  0834   GFRESTIMATED 57*   < > 53*   GFRESTBLACK  --   --  >60    < > = values in this interval not displayed.       Recent Labs   Lab Test 04/27/23  1743   CR 0.96*             Passed - Patient does NOT have a diagnosis of CHF.        Passed - Medication is active on med list        Passed - Patient is not pregnant        Passed - Patient has not had a positive pregnancy test within the past 12 mos.         Passed - Recent (6 mo) or future (30 days) visit within the authorizing provider's specialty     Patient had office visit in the last 6 months or has a visit in the next 30 days with authorizing provider or within the authorizing provider's specialty.  See \"Patient Info\" tab in inbasket, or \"Choose Columns\" in Meds & Orders section of the refill encounter.                 Moira Marcano RN 09/09/23 1:44 " AM

## 2023-09-11 RX ORDER — LISINOPRIL 30 MG/1
TABLET ORAL
Qty: 90 TABLET | Refills: 1 | Status: SHIPPED | OUTPATIENT
Start: 2023-09-11 | End: 2024-05-02

## 2023-10-26 ENCOUNTER — OFFICE VISIT (OUTPATIENT)
Dept: FAMILY MEDICINE | Facility: CLINIC | Age: 88
End: 2023-10-26
Payer: COMMERCIAL

## 2023-10-26 VITALS
DIASTOLIC BLOOD PRESSURE: 68 MMHG | RESPIRATION RATE: 10 BRPM | HEART RATE: 58 BPM | TEMPERATURE: 98.2 F | BODY MASS INDEX: 18.9 KG/M2 | SYSTOLIC BLOOD PRESSURE: 112 MMHG | WEIGHT: 100.1 LBS | HEIGHT: 61 IN | OXYGEN SATURATION: 99 %

## 2023-10-26 DIAGNOSIS — K21.9 GASTROESOPHAGEAL REFLUX DISEASE WITHOUT ESOPHAGITIS: Chronic | ICD-10-CM

## 2023-10-26 DIAGNOSIS — I10 BENIGN ESSENTIAL HYPERTENSION: Chronic | ICD-10-CM

## 2023-10-26 DIAGNOSIS — Z23 IMMUNIZATION DUE: ICD-10-CM

## 2023-10-26 DIAGNOSIS — K74.60 CIRRHOSIS OF LIVER WITHOUT ASCITES, UNSPECIFIED HEPATIC CIRRHOSIS TYPE (H): Chronic | ICD-10-CM

## 2023-10-26 DIAGNOSIS — I25.10 ATHEROSCLEROSIS OF NATIVE CORONARY ARTERY OF NATIVE HEART WITHOUT ANGINA PECTORIS: Chronic | ICD-10-CM

## 2023-10-26 DIAGNOSIS — N18.31 STAGE 3A CHRONIC KIDNEY DISEASE (H): Chronic | ICD-10-CM

## 2023-10-26 DIAGNOSIS — E11.9 TYPE 2 DIABETES MELLITUS WITHOUT COMPLICATION, WITHOUT LONG-TERM CURRENT USE OF INSULIN (H): Primary | Chronic | ICD-10-CM

## 2023-10-26 DIAGNOSIS — E78.2 MIXED HYPERLIPIDEMIA: Chronic | ICD-10-CM

## 2023-10-26 LAB — HBA1C MFR BLD: 7.3 % (ref 0–5.6)

## 2023-10-26 PROCEDURE — 83036 HEMOGLOBIN GLYCOSYLATED A1C: CPT | Performed by: FAMILY MEDICINE

## 2023-10-26 PROCEDURE — 91320 SARSCV2 VAC 30MCG TRS-SUC IM: CPT | Performed by: FAMILY MEDICINE

## 2023-10-26 PROCEDURE — 99207 PR FOOT EXAM NO CHARGE: CPT | Performed by: FAMILY MEDICINE

## 2023-10-26 PROCEDURE — 36415 COLL VENOUS BLD VENIPUNCTURE: CPT | Performed by: FAMILY MEDICINE

## 2023-10-26 PROCEDURE — 90480 ADMN SARSCOV2 VAC 1/ONLY CMP: CPT | Performed by: FAMILY MEDICINE

## 2023-10-26 PROCEDURE — 99214 OFFICE O/P EST MOD 30 MIN: CPT | Mod: 25 | Performed by: FAMILY MEDICINE

## 2023-10-26 PROCEDURE — 80048 BASIC METABOLIC PNL TOTAL CA: CPT | Performed by: FAMILY MEDICINE

## 2023-10-26 RX ORDER — RESPIRATORY SYNCYTIAL VIRUS VACCINE 120MCG/0.5
0.5 KIT INTRAMUSCULAR ONCE
Qty: 1 EACH | Refills: 0 | Status: CANCELLED | OUTPATIENT
Start: 2023-10-26 | End: 2023-10-26

## 2023-10-26 ASSESSMENT — PAIN SCALES - GENERAL: PAINLEVEL: NO PAIN (0)

## 2023-10-26 NOTE — LETTER
October 27, 2023      Myron Meng  [address]  [place] [address]        Dear ,    We are writing to inform you of your test results.    I am happy with your results.  I will see you in the spring.    Resulted Orders   Hemoglobin A1c   Result Value Ref Range    Hemoglobin A1C 7.3 (H) 0.0 - 5.6 %      Comment:      Normal <5.7%   Prediabetes 5.7-6.4%    Diabetes 6.5% or higher     Note: Adopted from ADA consensus guidelines.   Basic metabolic panel   Result Value Ref Range    Sodium 140 135 - 145 mmol/L      Comment:      Reference intervals for this test were updated on 09/26/2023 to more accurately reflect our healthy population. There may be differences in the flagging of prior results with similar values performed with this method. Interpretation of those prior results can be made in the context of the updated reference intervals.     Potassium 4.1 3.4 - 5.3 mmol/L    Chloride 103 98 - 107 mmol/L    Carbon Dioxide (CO2) 25 22 - 29 mmol/L    Anion Gap 12 7 - 15 mmol/L    Urea Nitrogen 11.6 8.0 - 23.0 mg/dL    Creatinine 0.97 (H) 0.51 - 0.95 mg/dL    GFR Estimate 57 (L) >60 mL/min/1.73m2    Calcium 9.6 8.8 - 10.2 mg/dL    Glucose 171 (H) 70 - 99 mg/dL       If you have any questions or concerns, please call the clinic at the number listed above.       Sincerely,      Neelima Patel MD

## 2023-10-26 NOTE — PROGRESS NOTES
Assessment & Plan     Type 2 diabetes mellitus without complication, without long-term current use of insulin (H)  Typically well controlled.  Last A1c was 7.2%.  Currently only on metformin.  Will monitor labs.  - Hemoglobin A1c  - Hemoglobin A1c    Atherosclerosis of native coronary artery of native heart without angina pectoris  This has been stable for years.    Benign essential hypertension  Currently on lisinopril 30 mg and metoprolol succinate 100 mg.  Well-controlled.  Could consider backing off again on lisinopril.  - Basic metabolic panel  - Basic metabolic panel    Stage 3a chronic kidney disease (H)  Last GFR of 57 which is improved.  Will monitor.  - Basic metabolic panel  - Basic metabolic panel    Mixed hyperlipidemia  Well-controlled on simvastatin 20 mg with last LDL of 65.    Cirrhosis of liver without ascites, unspecified hepatic cirrhosis type (H)  Seen on CT but not problematic for her at this point.    Gastroesophageal reflux disease without esophagitis  Currently on PPI which they get from [facility] which I believe is cheaper.  Symptoms are controlled.    Immunization due  Prefer to do only 1 immunization at a time.  I suggested doing COVID since it is out in the general population currently.  They can do a flu shot in 2 to 4 weeks from now.  Also discussed considering RSV immunization.  - COVID-19 12+ (2023-24) (PFIZER)    I will get back to them on lab results by [facility] and only call with grossly abnormal values.     FUTURE APPOINTMENTS:       - Follow-up visit in 6 months for her annual wellness visit    Neelima Patel MD  [facility]      Sergo Sanches is a 87 year old, presenting for the following health issues:  Medication Update (Med check. )        10/26/2023     5:32 PM   Additional Questions   Roomed by Sherrie ARROYO CMA   Accompanied by Son in law.       History of Present Illness       Diabetes:   She presents for follow up of diabetes.    She is  "not checking blood glucose.         She has no concerns regarding her diabetes at this time.   She is not experiencing numbness or burning in feet, excessive thirst, blurry vision, weight changes or redness, sores or blisters on feet. The patient has not had a diabetic eye exam in the last 12 months.          Hypertension: She presents for follow up of hypertension.  She does not check blood pressure  regularly outside of the clinic. Outpatient blood pressures have not been over 140/90. She does not follow a low salt diet.     She eats 0-1 servings of fruits and vegetables daily.She consumes 1 sweetened beverage(s) daily.She exercises with enough effort to increase her heart rate 10 to 19 minutes per day.  She exercises with enough effort to increase her heart rate 3 or less days per week.   She is taking medications regularly.           Objective    /68 (BP Location: Left arm, Patient Position: Sitting, Cuff Size: Adult Regular)   Pulse 58   Temp 98.2  F (36.8  C) (Oral)   Resp 10   Ht 1.549 m (5' 1\")   Wt 45.4 kg (100 lb 1.6 oz)   LMP  (LMP Unknown)   SpO2 99%   BMI 18.91 kg/m    Body mass index is 18.91 kg/m .  Physical Exam   GENERAL: healthy, alert, no distress, and elderly  RESP: lungs clear to auscultation - no rales, rhonchi or wheezes  CV: regular rates and rhythm  ABDOMEN: Denies tenderness, may have some discomfort in the left upper quadrant with deep palpation  MS: no gross musculoskeletal defects noted, no edema  Diabetic foot exam: Well cared for skin and nails.  Very small amount of fungal findings under edge of toenails.  Good pulses, no neuropathy on filament testing.                  "

## 2023-10-27 LAB
ANION GAP SERPL CALCULATED.3IONS-SCNC: 12 MMOL/L (ref 7–15)
BUN SERPL-MCNC: 11.6 MG/DL (ref 8–23)
CALCIUM SERPL-MCNC: 9.6 MG/DL (ref 8.8–10.2)
CHLORIDE SERPL-SCNC: 103 MMOL/L (ref 98–107)
CREAT SERPL-MCNC: 0.97 MG/DL (ref 0.51–0.95)
DEPRECATED HCO3 PLAS-SCNC: 25 MMOL/L (ref 22–29)
EGFRCR SERPLBLD CKD-EPI 2021: 57 ML/MIN/1.73M2
GLUCOSE SERPL-MCNC: 171 MG/DL (ref 70–99)
POTASSIUM SERPL-SCNC: 4.1 MMOL/L (ref 3.4–5.3)
SODIUM SERPL-SCNC: 140 MMOL/L (ref 135–145)

## 2023-12-01 ENCOUNTER — NURSE TRIAGE (OUTPATIENT)
Dept: FAMILY MEDICINE | Facility: CLINIC | Age: 88
End: 2023-12-01
Payer: COMMERCIAL

## 2023-12-01 NOTE — TELEPHONE ENCOUNTER
Nurse Triage SBAR    Is this a 2nd Level Triage? NO    Situation: Dizziness.  Patient has been dizzy in the morning when first getting up and occasionally during the night for the past 5 days.     Background: Type 2 DM, hypertension, CKD 3, osteoporosis, cirrhosis of liver.    Assessment:   Patient's son in law called and patient was with him.  Patient is afebrile, denies any bowel issues and states is taking medications as ordered, but may miss occasionally.  She does not check blood pressure at home and does not check blood sugars.  She reports feeling that she may tip over when walking in mornings and sometimes when getting up during the night.  , but in afternoons (time of call) is feeling fine.  Denies any vision changes or nausea and is eating and drinking well.      Protocol Recommended Disposition:   See in Office Within 3 Days    Recommendation: Scheduled with PCP for Monday.  Advised to go in if symptoms worsen over the weekend.  Patient's son in law verbalized understanding.    DONTRELL Ignacio RN  [facility]          Does the patient meet one of the following criteria for ADS visit consideration? 16+ years old, with an [facility] PCP     TIP  Providers, please consider if this condition is appropriate for management at one of our Acute and Diagnostic Services sites.     If patient is a good candidate, please use dotphrase <dot>triageresponse and select Refer to ADS to document.  Reason for Disposition   MILD dizziness (e.g., walking normally) and has NOT been evaluated by physician for this (Exception: Dizziness caused by heat exposure, sudden standing, or poor fluid intake.)    Additional Information   Negative: SEVERE difficulty breathing (e.g., struggling for each breath, speaks in single words)   Negative: Shock suspected (e.g., cold/pale/clammy skin, too weak to stand, low BP, rapid pulse)   Negative: Difficult to awaken or acting confused (e.g., disoriented, slurred speech)    Negative: Fainted, and still feels dizzy afterwards   Negative: Overdose (accidental or intentional) of medications   Negative: New neurologic deficit that is present now: * Weakness of the face, arm, or leg on one side of the body * Numbness of the face, arm, or leg on one side of the body * Loss of speech or garbled speech   Negative: Heart beating < 50 beats per minute OR > 140 beats per minute   Negative: Sounds like a life-threatening emergency to the triager   Negative: Chest pain   Negative: Rectal bleeding, bloody stool, or tarry-black stool   Negative: Vomiting is main symptom   Negative: Diarrhea is main symptom   Negative: Headache is main symptom   Negative: Heat exhaustion suspected (i.e., dehydration from heat exposure)   Negative: Patient states that they are having an anxiety or panic attack   Negative: Dizziness from low blood sugar (i.e., < 60 mg/dl or 3.5 mmol/l)   Negative: SEVERE dizziness (e.g., unable to stand, requires support to walk, feels like passing out now)   Negative: SEVERE headache or neck pain   Negative: Spinning or tilting sensation (vertigo) present now and one or more stroke risk factors (i.e., hypertension, diabetes mellitus, prior stroke/TIA, heart attack, age over 60) (Exception: Prior physician evaluation for this AND no different/worse than usual.)   Negative: Neurologic deficit that was brief (now gone), ANY of the following:* Weakness of the face, arm, or leg on one side of the body* Numbness of the face, arm, or leg on one side of the body* Loss of speech or garbled speech   Negative: Loss of vision or double vision  (Exception: Similar to previous migraines.)   Negative: Extra heartbeats, irregular heart beating, or heart is beating very fast (i.e., 'palpitations')   Negative: Difficulty breathing   Negative: Drinking very little and dehydration suspected (e.g., no urine > 12 hours, very dry mouth, very lightheaded)   Negative: Follows bleeding (e.g., stomach, rectum,  "vagina)  (Exception: Became dizzy from sight of small amount blood.)   Negative: Patient sounds very sick or weak to the triager   Negative: Lightheadedness (dizziness) present now, after 2 hours of rest and fluids   Negative: Spinning or tilting sensation (vertigo) present now   Negative: Fever > 103 F (39.4 C)   Negative: Fever > 100.0 F (37.8 C) and has diabetes mellitus or a weak immune system (e.g., HIV positive, cancer chemotherapy, organ transplant, splenectomy, chronic steroids)   Negative: MODERATE dizziness (e.g., interferes with normal activities)  (Exception: Dizziness caused by heat exposure, sudden standing, or poor fluid intake.)   Negative: Vomiting occurs with dizziness   Negative: Patient wants to be seen   Negative: Taking a medicine that could cause dizziness (e.g., blood pressure medications, diuretics)    Answer Assessment - Initial Assessment Questions  1. DESCRIPTION: \"Describe your dizziness.\"      Feels like spinning and tilting  2. LIGHTHEADED: \"Do you feel lightheaded?\" (e.g., somewhat faint, woozy, weak upon standing)      tilting  3. VERTIGO: \"Do you feel like either you or the room is spinning or tilting?\" (i.e. vertigo)      vertigo  4. SEVERITY: \"How bad is it?\"  \"Do you feel like you are going to faint?\" \"Can you stand and walk?\"    - MILD: Feels slightly dizzy, but walking normally.    - MODERATE: Feels unsteady when walking, but not falling; interferes with normal activities (e.g., school, work).    - SEVERE: Unable to walk without falling, or requires assistance to walk without falling; feels like passing out now.       Moderate  5. ONSET:  \"When did the dizziness begin?\"      5-7 days ago  6. AGGRAVATING FACTORS: \"Does anything make it worse?\" (e.g., standing, change in head position)      Getting up in the morning  7. HEART RATE: \"Can you tell me your heart rate?\" \"How many beats in 15 seconds?\"  (Note: not all patients can do this)        unsure  8. CAUSE: \"What do you think " "is causing the dizziness?\"      unsure  9. RECURRENT SYMPTOM: \"Have you had dizziness before?\" If Yes, ask: \"When was the last time?\" \"What happened that time?\"      Year ago  10. OTHER SYMPTOMS: \"Do you have any other symptoms?\" (e.g., fever, chest pain, vomiting, diarrhea, bleeding)        Only when gets up in the morning  11. PREGNANCY: \"Is there any chance you are pregnant?\" \"When was your last menstrual period?\"        no    Protocols used: Dizziness-A-OH    "

## 2023-12-04 ENCOUNTER — OFFICE VISIT (OUTPATIENT)
Dept: FAMILY MEDICINE | Facility: CLINIC | Age: 88
End: 2023-12-04
Payer: COMMERCIAL

## 2023-12-04 VITALS
HEIGHT: 61 IN | WEIGHT: 100 LBS | TEMPERATURE: 97.5 F | HEART RATE: 55 BPM | SYSTOLIC BLOOD PRESSURE: 118 MMHG | DIASTOLIC BLOOD PRESSURE: 80 MMHG | BODY MASS INDEX: 18.88 KG/M2 | OXYGEN SATURATION: 98 % | RESPIRATION RATE: 16 BRPM

## 2023-12-04 DIAGNOSIS — H81.11 BENIGN PAROXYSMAL POSITIONAL VERTIGO OF RIGHT EAR: Primary | ICD-10-CM

## 2023-12-04 DIAGNOSIS — Z23 IMMUNIZATION DUE: ICD-10-CM

## 2023-12-04 PROCEDURE — 90662 IIV NO PRSV INCREASED AG IM: CPT | Performed by: FAMILY MEDICINE

## 2023-12-04 PROCEDURE — G0008 ADMIN INFLUENZA VIRUS VAC: HCPCS | Performed by: FAMILY MEDICINE

## 2023-12-04 PROCEDURE — 99213 OFFICE O/P EST LOW 20 MIN: CPT | Mod: 25 | Performed by: FAMILY MEDICINE

## 2023-12-04 RX ORDER — MECLIZINE HYDROCHLORIDE 25 MG/1
25 TABLET ORAL 3 TIMES DAILY PRN
Qty: 30 TABLET | Refills: 0 | Status: SHIPPED | OUTPATIENT
Start: 2023-12-04 | End: 2024-05-02

## 2023-12-04 RX ORDER — RESPIRATORY SYNCYTIAL VIRUS VACCINE 120MCG/0.5
0.5 KIT INTRAMUSCULAR ONCE
Qty: 1 EACH | Refills: 0 | Status: CANCELLED | OUTPATIENT
Start: 2023-12-04 | End: 2023-12-04

## 2023-12-04 ASSESSMENT — PAIN SCALES - GENERAL: PAINLEVEL: NO PAIN (0)

## 2023-12-04 NOTE — PROGRESS NOTES
Assessment & Plan     Benign paroxysmal positional vertigo of right ear  Classic description of benign paroxysmal positional vertigo.  I will treat her for this with physical therapy/Occupational Therapy and meclizine in the meanwhile.  - meclizine (ANTIVERT) 25 MG tablet  Dispense: 30 tablet; Refill: 0  - Physical Therapy Referral    Immunization due  Had her COVID-vaccine but needs flu shot today.  - INFLUENZA VACCINE 65+ (FLUZONE HD)    They would let me know if they are having any problems getting scheduled or having reoccurring problems with the BPPV.    FUTURE APPOINTMENTS:       - Follow-up visit in May for her usual diabetes check which has already been scheduled    Neelima Patel MD  [facility] DANIEL Sanches is a 87 year old, presenting for the following health issues:  Dizziness (Feels dizzy in morning, no falls, sx started over 1 week ago. )      12/4/2023     8:53 AM   Additional Questions   Roomed by beverly armijo cma   Accompanied by son in law       History of Present Illness       Reason for visit:  Dizziness in the morning    She eats 2-3 servings of fruits and vegetables daily.She consumes 0 sweetened beverage(s) daily.She exercises with enough effort to increase her heart rate 20 to 29 minutes per day.  She exercises with enough effort to increase her heart rate 5 days per week.   She is taking medications regularly.     For the last few days the patient has had trouble when laying down and rolling over.  She becomes dizzy.  She also gets dizzy when she gets up in the morning.  It is short-lived.  We discussed that this is likely benign positional vertigo.  She has had this before and was treated with maneuvers and meclizine per the son in law.  He wonders if he needs to bring her to a specialist.  I tell her and her son-in-law that I can get them in quickly to PT/OT for maneuvers.  They ask for the medicine just in case it takes a while.      Objective   "  /80   Pulse 55   Temp 97.5  F (36.4  C)   Resp 16   Ht 1.549 m (5' 1\")   Wt 45.4 kg (100 lb)   LMP  (LMP Unknown)   SpO2 98%   BMI 18.89 kg/m    Body mass index is 18.89 kg/m .  Physical Exam   GENERAL: Healthy, alert, no distress, and normal weight  RESP: lungs clear to auscultation - no rales, rhonchi or wheezes  CV: regular rates and rhythm  ABDOMEN: soft, nontender and bowel sounds normal  MS: no gross musculoskeletal defects noted, no edema                  "

## 2023-12-22 ENCOUNTER — THERAPY VISIT (OUTPATIENT)
Dept: PHYSICAL THERAPY | Facility: REHABILITATION | Age: 88
End: 2023-12-22
Attending: FAMILY MEDICINE
Payer: COMMERCIAL

## 2023-12-22 DIAGNOSIS — H81.12 BENIGN PAROXYSMAL POSITIONAL VERTIGO, LEFT: Primary | ICD-10-CM

## 2023-12-22 DIAGNOSIS — H81.11 BENIGN PAROXYSMAL POSITIONAL VERTIGO OF RIGHT EAR: ICD-10-CM

## 2023-12-22 PROCEDURE — 97161 PT EVAL LOW COMPLEX 20 MIN: CPT | Mod: GP | Performed by: PHYSICAL THERAPIST

## 2023-12-22 PROCEDURE — 95992 CANALITH REPOSITIONING PROC: CPT | Mod: GP | Performed by: PHYSICAL THERAPIST

## 2023-12-22 NOTE — PROGRESS NOTES
PHYSICAL THERAPY EVALUATION  Type of Visit: Evaluation    See electronic medical record for Abuse and Falls Screening details.    Subjective       Presenting condition or subjective complaint: Dizziness  Date of onset: 12/04/23    Relevant medical history:     Dates & types of surgery:      Prior diagnostic imaging/testing results:       Prior therapy history for the same diagnosis, illness or injury: No      Prior Level of Function  Transfers: Independent  Ambulation: Independent  ADL: Independent    Living Environment  Social support: With family members   Type of home: House   Stairs to enter the home: Yes 2 Is there a railing: No   Ramp: Yes   Stairs inside the home: No       Help at home: None  Equipment owned:       Employment: No    Hobbies/Interests:      Patient goals for therapy: Sound sleep    Pain assessment: Pain denied     Objective      Cognitive Status Examination  Orientation: Oriented to person, place and time   Level of Consciousness: Alert  Follows Commands and Answers Questions: 100% of the time  Personal Safety and Judgement: Intact  Memory:  not challenged    BED MOBILITY: Independent    TRANSFERS: Independent    WHEELCHAIR MOBILITY: NA    GAIT:   Level of [place]: Independent  Assistive Device(s): None  Gait Deviations: Base of support increased  Marsha decreased      BALANCE:            VESTIBULAR EVALUATION  ADDITIONAL HISTORY:  Description of symptoms: I feel like I am spinning  Dizzy attacks:   Start: 4-5 weeks   Last attack: Last night   Frequency of occurrences: Daily   Length of attack: 2-3 minutes  Difficulty hearing: Both ears  Noise in ears? No    Alleviates symptoms:    Worsens symptoms: Sleeping  Activities that bring on symptoms:         Pertinent visual history: NA  Pertinent history of current vestibular problem:   DHI: Total Score: 12    Cervicogenic Screen    Neck ROM Normal   Vertebral Artery Test    Alar Ligament Test    Transverse Ligament Test    Distraction    Neck  Torsion Test (head still, body rotating)    Neck Torsion Test (head and body rotating)         Oculomotor Screen    Ocular ROM Normal   Smooth Pursuit Normal   Saccades Normal   VOR    VOR Cancellation    Head Impulse Test    Convergence Testing         Infrared Goggle Exam Vestibular Suppressant in Last 24 Hours? Yes  Exam Completed With: Infrared goggles   Spontaneous Nystagmus Negative   Gaze Evoked Nystagmus Negative   Head Shake Horizontal Nystagmus    Positional Testing    Supine Head-Hanging Test     Left Right   Sami-Hallpike Negative Negative   Sidelying Test     HSCC Supine Roll Test Horizontal L Negative   HSCC Forward Roll Test     Grabill and Lean Test -  Sitting Erect    Grabill and Lean Test - Seated, Head Bent 60 Degrees Forward    Grabill and Lean Test - Seated, Head Bent Backwards       BPPV Canal(s): L Horizontal  BPPV Type: Canalithasis    Dynamic Visual Acuity (DVA)    Static Acuity (LogMar)    Horizontal Head Movement at 1 Hz (LogMar)    Horizontal Head Movement at 2 Hz (LogMar)           Assessment & Plan   CLINICAL IMPRESSIONS  Medical Diagnosis: Benign paroxysmal positional vertigo of right ear    Treatment Diagnosis: Benign paroxysmal positional vertigo of right ear, Benign paroxysmal positional vertigo of left ear   Impression/Assessment: Patient is a 88 year old female with dizziness and vertigo with lying down and rolling in bed complaints.  The following significant findings have been identified: Dizziness and Disequilibrium . These impairments interfere with their ability to perform self care tasks and household mobility as compared to previous level of function.     Clinical Decision Making (Complexity):  Clinical Presentation: Stable/Uncomplicated  Clinical Presentation Rationale: based on medical and personal factors listed in PT evaluation  Clinical Decision Making (Complexity): Low complexity    PLAN OF CARE  Treatment Interventions:  Interventions: Gait Training, Neuromuscular Re-education,  Canalith Repositioning    Long Term Goals     PT Goal 1  Goal Identifier: sleep  Goal Description: Patient will sleep through the night without vertigo.  Rationale: to maximize safety and independence with performance of ADLs and functional tasks  Goal Progress: initial  Target Date: 02/20/24  PT Goal 2  Goal Identifier: rolling  Goal Description: Patient will perform bed mobility including rolling without dizziness or vertigo.  Rationale: to maximize safety and independence with performance of ADLs and functional tasks  Goal Progress: initial  Target Date: 02/20/24      Frequency of Treatment: 1x/week  Duration of Treatment: 60 days    Recommended Referrals to Other Professionals: not at this time.  Education Assessment:        Risks and benefits of evaluation/treatment have been explained.   Patient/Family/caregiver agrees with Plan of Care.     Evaluation Time:     PT Eval, Low Complexity Minutes (83979): 35       Signing Clinician: Juan M Carlisle, PT      [facility]                                                                                   OUTPATIENT PHYSICAL THERAPY      PLAN OF TREATMENT FOR OUTPATIENT REHABILITATION   Patient's Last Name, First Name, Myron Haro    YOB: 1935   Provider's Name   [facility]   Medical Record No.  [id number]     Onset Date: 12/04/23  Start of Care Date: 12/22/23     Medical Diagnosis:  Benign paroxysmal positional vertigo of right ear      PT Treatment Diagnosis:  Benign paroxysmal positional vertigo of right ear, Benign paroxysmal positional vertigo of left ear Plan of Treatment  Frequency/Duration: 1x/week/ 60 days    Certification date from 12/22/23 to 02/20/24         See note for plan of treatment details and functional goals     Juan M Carlisle, PT                         I CERTIFY THE NEED FOR THESE SERVICES FURNISHED UNDER        THIS PLAN OF TREATMENT AND WHILE UNDER MY CARE      (Physician attestation of this document indicates review and certification of the therapy plan).              Referring Provider:  Neelima Patel    Initial Assessment  See Epic Evaluation- Start of Care Date: 12/22/23

## 2024-01-02 NOTE — PROGRESS NOTES
DISCHARGE  Reason for Discharge: Patient chooses to discontinue therapy.  Patient has failed to schedule further appointments.    Equipment Issued: NA    Discharge Plan: Patient to continue home program.    Referring Provider:  Neelima Patel       12/22/23 0500   Appointment Info   Signing clinician's name / credentials Juan M Carlisle, PT   Visits Used 1   Medical Diagnosis Benign paroxysmal positional vertigo of right ear   PT Tx Diagnosis Benign paroxysmal positional vertigo of right ear, Benign paroxysmal positional vertigo of left ear   Progress Note/Certification   Start of Care Date 12/22/23   Onset of illness/injury or Date of Surgery 12/04/23   Therapy Frequency 1x/week   Predicted Duration 60 days   Certification date from 12/22/23   Certification date to 02/20/24   Progress Note Due Date 01/21/24   Progress Note Completed Date 12/22/23   PT Goal 1   Goal Identifier sleep   Goal Description Patient will sleep through the night without vertigo.   Rationale to maximize safety and independence with performance of ADLs and functional tasks   Goal Progress initial   Target Date 02/20/24   PT Goal 2   Goal Identifier rolling   Goal Description Patient will perform bed mobility including rolling without dizziness or vertigo.   Rationale to maximize safety and independence with performance of ADLs and functional tasks   Goal Progress initial   Target Date 02/20/24   Subjective Report   Subjective Report Myron is a pleasant 87 y/o female accompanied by her son-in-law today for PT evaluation.  She c/o dizziness and vertigo with rolling in bed and sleeping on her side.  She speaks Ivonne .  Her son-in-law interprets for her.  Waiver on file.   Standard Canalith Repositioning   Standard canalith repositioning procedure minutes (86930) 10   Canalith Repositioning - Details BBQ roll for left horizontal canal canalithiasis x 2   Patient Response/Progress tolerated well with report of improved symptoms post  treatment.   Eval/Assessments   PT Eval, Low Complexity Minutes (01763) 35   Plan   Plan for next session re-test as appropriate.   Total Session Time   Total Treatment Time (sum of timed and untimed services) 45

## 2024-01-14 ENCOUNTER — HEALTH MAINTENANCE LETTER (OUTPATIENT)
Age: 89
End: 2024-01-14

## 2024-02-24 DIAGNOSIS — E11.9 TYPE 2 DIABETES MELLITUS WITHOUT COMPLICATION, WITHOUT LONG-TERM CURRENT USE OF INSULIN (H): Chronic | ICD-10-CM

## 2024-04-26 SDOH — HEALTH STABILITY: PHYSICAL HEALTH: ON AVERAGE, HOW MANY MINUTES DO YOU ENGAGE IN EXERCISE AT THIS LEVEL?: 30 MIN

## 2024-04-26 SDOH — HEALTH STABILITY: PHYSICAL HEALTH: ON AVERAGE, HOW MANY DAYS PER WEEK DO YOU ENGAGE IN MODERATE TO STRENUOUS EXERCISE (LIKE A BRISK WALK)?: 2 DAYS

## 2024-04-26 ASSESSMENT — SOCIAL DETERMINANTS OF HEALTH (SDOH): HOW OFTEN DO YOU GET TOGETHER WITH FRIENDS OR RELATIVES?: ONCE A WEEK

## 2024-05-02 ENCOUNTER — OFFICE VISIT (OUTPATIENT)
Dept: FAMILY MEDICINE | Facility: CLINIC | Age: 89
End: 2024-05-02
Payer: COMMERCIAL

## 2024-05-02 VITALS
TEMPERATURE: 98.1 F | WEIGHT: 97.2 LBS | RESPIRATION RATE: 10 BRPM | BODY MASS INDEX: 18.35 KG/M2 | DIASTOLIC BLOOD PRESSURE: 73 MMHG | HEART RATE: 70 BPM | OXYGEN SATURATION: 98 % | HEIGHT: 61 IN | SYSTOLIC BLOOD PRESSURE: 136 MMHG

## 2024-05-02 DIAGNOSIS — Z00.00 ENCOUNTER FOR MEDICARE ANNUAL WELLNESS EXAM: Primary | ICD-10-CM

## 2024-05-02 DIAGNOSIS — E11.22 TYPE 2 DIABETES MELLITUS WITH STAGE 3A CHRONIC KIDNEY DISEASE, WITHOUT LONG-TERM CURRENT USE OF INSULIN (H): ICD-10-CM

## 2024-05-02 DIAGNOSIS — E78.2 MIXED HYPERLIPIDEMIA: Chronic | ICD-10-CM

## 2024-05-02 DIAGNOSIS — M81.0 AGE-RELATED OSTEOPOROSIS WITHOUT CURRENT PATHOLOGICAL FRACTURE: Chronic | ICD-10-CM

## 2024-05-02 DIAGNOSIS — K76.6 PORTAL HYPERTENSION (H): Chronic | ICD-10-CM

## 2024-05-02 DIAGNOSIS — K21.9 GASTROESOPHAGEAL REFLUX DISEASE WITHOUT ESOPHAGITIS: Chronic | ICD-10-CM

## 2024-05-02 DIAGNOSIS — Z23 IMMUNIZATION DUE: ICD-10-CM

## 2024-05-02 DIAGNOSIS — H04.123 DRY EYES: ICD-10-CM

## 2024-05-02 DIAGNOSIS — N18.31 STAGE 3A CHRONIC KIDNEY DISEASE (H): Chronic | ICD-10-CM

## 2024-05-02 DIAGNOSIS — I10 BENIGN ESSENTIAL HYPERTENSION: Chronic | ICD-10-CM

## 2024-05-02 DIAGNOSIS — H81.11 BENIGN PAROXYSMAL POSITIONAL VERTIGO OF RIGHT EAR: ICD-10-CM

## 2024-05-02 DIAGNOSIS — K74.60 CIRRHOSIS OF LIVER WITHOUT ASCITES, UNSPECIFIED HEPATIC CIRRHOSIS TYPE (H): Chronic | ICD-10-CM

## 2024-05-02 DIAGNOSIS — N18.31 TYPE 2 DIABETES MELLITUS WITH STAGE 3A CHRONIC KIDNEY DISEASE, WITHOUT LONG-TERM CURRENT USE OF INSULIN (H): ICD-10-CM

## 2024-05-02 DIAGNOSIS — I25.10 ATHEROSCLEROSIS OF NATIVE CORONARY ARTERY OF NATIVE HEART WITHOUT ANGINA PECTORIS: Chronic | ICD-10-CM

## 2024-05-02 LAB
HBA1C MFR BLD: 6.9 % (ref 0–5.6)
HGB BLD-MCNC: 11.3 G/DL (ref 11.7–15.7)

## 2024-05-02 PROCEDURE — 90480 ADMN SARSCOV2 VAC 1/ONLY CMP: CPT | Performed by: FAMILY MEDICINE

## 2024-05-02 PROCEDURE — 82043 UR ALBUMIN QUANTITATIVE: CPT | Performed by: FAMILY MEDICINE

## 2024-05-02 PROCEDURE — 80053 COMPREHEN METABOLIC PANEL: CPT | Performed by: FAMILY MEDICINE

## 2024-05-02 PROCEDURE — 91320 SARSCV2 VAC 30MCG TRS-SUC IM: CPT | Performed by: FAMILY MEDICINE

## 2024-05-02 PROCEDURE — 80061 LIPID PANEL: CPT | Performed by: FAMILY MEDICINE

## 2024-05-02 PROCEDURE — G0439 PPPS, SUBSEQ VISIT: HCPCS | Performed by: FAMILY MEDICINE

## 2024-05-02 PROCEDURE — 99214 OFFICE O/P EST MOD 30 MIN: CPT | Mod: 25 | Performed by: FAMILY MEDICINE

## 2024-05-02 PROCEDURE — 36415 COLL VENOUS BLD VENIPUNCTURE: CPT | Performed by: FAMILY MEDICINE

## 2024-05-02 PROCEDURE — 82570 ASSAY OF URINE CREATININE: CPT | Performed by: FAMILY MEDICINE

## 2024-05-02 PROCEDURE — 83036 HEMOGLOBIN GLYCOSYLATED A1C: CPT | Performed by: FAMILY MEDICINE

## 2024-05-02 PROCEDURE — 85018 HEMOGLOBIN: CPT | Performed by: FAMILY MEDICINE

## 2024-05-02 RX ORDER — PANTOPRAZOLE SODIUM 40 MG/1
40 TABLET, DELAYED RELEASE ORAL DAILY
Qty: 90 TABLET | Refills: 1 | Status: SHIPPED | OUTPATIENT
Start: 2024-05-02

## 2024-05-02 RX ORDER — RESPIRATORY SYNCYTIAL VIRUS VACCINE 120MCG/0.5
0.5 KIT INTRAMUSCULAR ONCE
Qty: 1 EACH | Refills: 0 | Status: CANCELLED | OUTPATIENT
Start: 2024-05-02 | End: 2024-05-02

## 2024-05-02 RX ORDER — METOPROLOL SUCCINATE 100 MG/1
100 TABLET, EXTENDED RELEASE ORAL DAILY
Qty: 90 TABLET | Refills: 2 | Status: SHIPPED | OUTPATIENT
Start: 2024-05-02

## 2024-05-02 RX ORDER — LISINOPRIL 30 MG/1
30 TABLET ORAL DAILY
Qty: 90 TABLET | Refills: 1 | Status: SHIPPED | OUTPATIENT
Start: 2024-05-02 | End: 2024-09-18

## 2024-05-02 RX ORDER — CYCLOSPORINE 0.5 MG/ML
1 EMULSION OPHTHALMIC 2 TIMES DAILY
Qty: 180 ML | Refills: 1 | Status: SHIPPED | OUTPATIENT
Start: 2024-05-02

## 2024-05-02 RX ORDER — SIMVASTATIN 20 MG
TABLET ORAL
Qty: 90 TABLET | Refills: 3 | Status: SHIPPED | OUTPATIENT
Start: 2024-05-02

## 2024-05-02 RX ORDER — MECLIZINE HYDROCHLORIDE 25 MG/1
25 TABLET ORAL 3 TIMES DAILY PRN
Qty: 30 TABLET | Refills: 0 | Status: SHIPPED | OUTPATIENT
Start: 2024-05-02

## 2024-05-02 ASSESSMENT — PAIN SCALES - GENERAL: PAINLEVEL: NO PAIN (0)

## 2024-05-02 NOTE — PROGRESS NOTES
Preventive Care Visit  [facility]  Neelima Patel MD, Family Medicine  May 2, 2024      Assessment & Plan     Encounter for Medicare annual wellness exam  Patient is eligible for RSV, Shingrix, and COVID booster vaccinations.  - PRIMARY CARE FOLLOW-UP SCHEDULING    Type 2 diabetes mellitus with stage 3a chronic kidney disease, without long-term current use of insulin (H)  Patient is a well-controlled type II diabetic who is on metformin only.  Will monitor labs and refill medication.  - Hemoglobin A1c  - metFORMIN (GLUCOPHAGE) 500 MG tablet  Dispense: 360 tablet; Refill: 1  - Hemoglobin A1c    Atherosclerosis of native coronary artery of native heart without angina pectoris  Patient has a remote history of coronary artery disease.  No problems recently.    Cirrhosis of liver without ascites, unspecified hepatic cirrhosis type (H)  Patient has a history of cirrhosis which is not problematic.  Will monitor labs however.  - Comprehensive metabolic panel (BMP + Alb, Alk Phos, ALT, AST, Total. Bili, TP)  - Comprehensive metabolic panel (BMP + Alb, Alk Phos, ALT, AST, Total. Bili, TP)    Portal hypertension (H)  Studies do show she has some portal hypertension.  She is nonsymptomatic.    Gastroesophageal reflux disease without esophagitis  Patient does have some chronic reflux.  Will refill medication.  - pantoprazole (PROTONIX) 40 MG EC tablet  Dispense: 90 tablet; Refill: 1    Benign essential hypertension  Blood pressure is controlled today on lisinopril and Metroprolol.  Will monitor labs.  - Comprehensive metabolic panel (BMP + Alb, Alk Phos, ALT, AST, Total. Bili, TP)  - lisinopril (ZESTRIL) 30 MG tablet  Dispense: 90 tablet; Refill: 1  - metoprolol succinate ER (TOPROL XL) 100 MG 24 hr tablet  Dispense: 90 tablet; Refill: 2  - Comprehensive metabolic panel (BMP + Alb, Alk Phos, ALT, AST, Total. Bili, TP)    Stage 3a chronic kidney disease (H)  Patient has mild chronic kidney disease  that is very borderline.  I will recheck labs.  - Albumin Random Urine Quantitative with Creat Ratio  - Hemoglobin  - Albumin Random Urine Quantitative with Creat Ratio  - Hemoglobin    Mixed hyperlipidemia  Patient is currently on simvastatin 20 mg.  Typically well-controlled.  Will refill med.  - Lipid panel reflex to direct LDL Non-fasting  - simvastatin (ZOCOR) 20 MG tablet  Dispense: 90 tablet; Refill: 3  - Lipid panel reflex to direct LDL Non-fasting    Age-related osteoporosis without current pathological fracture  Patient is vegetarian as well as does not drink much milk.  She has needed to use reflux medicine.    Benign paroxysmal positional vertigo of right ear  Asks for refill of medication although she is not symptomatic currently.  - meclizine (ANTIVERT) 25 MG tablet  Dispense: 30 tablet; Refill: 0    Dry eyes  Needs refill of her Restasis eyedrops.  - cycloSPORINE (RESTASIS) 0.05 % ophthalmic emulsion  Dispense: 180 mL; Refill: 1    Immunization due  Patient is willing to have her COVID booster today.  Should have her RSV vaccine at her pharmacy.  - COVID-19 12+ (2023-24) (PFIZER)    I will get back to them on lab results by [place] and only call with grossly abnormal values.  Meds have been refilled.      FUTURE APPOINTMENTS:       - Follow-up visit in 6 months for med check/diabetes check.      Sergo Sanches is a 88 year old, presenting for the following:  Wellness Visit (Annual Wellness Visit. )        5/2/2024     4:46 PM   Additional Questions   Roomed by Sherrie ARROYO CMA   Accompanied by Son in law         Health Care Directive  Patient does not have a Health Care Directive or Living Will: Discussed advance care planning with patient; however, patient declined at this time.    HPI  Patient comes in today with her son-in-law who acts as her .  He does this for a living on the side.  She reports she is doing well.  She is slowing down some as she ages.  She still likes to cook.  She  has not had an eye exam in a long while but they will think about it.        4/26/2024   General Health   How would you rate your overall physical health? Good   Feel stress (tense, anxious, or unable to sleep) Only a little   (!) STRESS CONCERN      4/26/2024   Nutrition   Diet: Low salt    Diabetic    Vegetarian/vegan         4/26/2024   Exercise   Days per week of moderate/strenous exercise 2 days   Average minutes spent exercising at this level 30 min   (!) EXERCISE CONCERN      4/26/2024   Social Factors   Frequency of gathering with friends or relatives Once a week   Worry food won't last until get money to buy more No   Food not last or not have enough money for food? No   Do you have housing?  Yes   Are you worried about losing your housing? No   Lack of transportation? No   Unable to get utilities (heat,electricity)? No         4/26/2024   Fall Risk   Fallen 2 or more times in the past year? No   Trouble with walking or balance? No          4/26/2024   Activities of Daily Living- Home Safety   Needs help with the following daily activites None of the above   Safety concerns in the home None of the above         4/26/2024   Dental   Dentist two times every year? (!) NO         4/26/2024   Hearing Screening   Hearing concerns? (!) I NEED TO ASK PEOPLE TO SPEAK UP OR REPEAT THEMSELVES.    (!) TROUBLE UNDERSTANDING SOFT OR WHISPERED SPEECH.    (!) TROUBLE UNDERSTANDING SPEECH ON THE TELEPHONE         4/26/2024   Driving Risk Screening   Patient/family members have concerns about driving No         4/26/2024   General Alertness/Fatigue Screening   Have you been more tired than usual lately? No         4/26/2024   Urinary Incontinence Screening   Bothered by leaking urine in past 6 months No         4/26/2024   TB Screening   Were you born outside of the [place]? Yes         Today's PHQ-2 Score:       5/2/2024     4:50 PM   PHQ-2 ( 1999 Pfizer)   Q1: Little interest or pleasure in doing things 0   Q2: Feeling down,  depressed or hopeless 0   PHQ-2 Score 0           4/26/2024   Substance Use   Alcohol more than 3/day or more than 7/wk Not Applicable   Do you have a current opioid prescription? No   How severe/bad is pain from 1 to 10? 0/10 (No Pain)   Do you use any other substances recreationally? No     Social History     Tobacco Use    Smoking status: Never     Passive exposure: Never    Smokeless tobacco: Never   Vaping Use    Vaping status: Never Used   Substance Use Topics    Alcohol use: Never    Drug use: Never          Mammogram Screening - After age 74- determine frequency with patient based on health status, life expectancy and patient goals          Reviewed and updated as needed this visit by Provider                    Past Medical History:   Diagnosis Date    Chronic kidney disease     Chronic reflux esophagitis     Cirrhosis (H)     Coronary artery disease     Hyperlipidemia     Hyperlipidemia     Hypertension     Type II diabetes mellitus (H)      Past Surgical History:   Procedure Laterality Date    COLECTOMY LEFT Left     NV ESOPHAGOGASTRODUODENOSCOPY TRANSORAL DIAGNOSTIC N/A 7/6/2021    Procedure: ESOPHAGOGASTRODUODENOSCOPY (EGD) with Biopsies;  Surgeon: Julio Nix MD;  Location: [facility];  Service: Gastroenterology     BP Readings from Last 3 Encounters:   05/02/24 136/73   12/04/23 118/80   10/26/23 112/68    Wt Readings from Last 3 Encounters:   05/02/24 44.1 kg (97 lb 3.2 oz)   12/04/23 45.4 kg (100 lb)   10/26/23 45.4 kg (100 lb 1.6 oz)                  Current providers sharing in care for this patient include:  Patient Care Team:  Neelima Patel MD as PCP - General (Family Practice)  Neelima Patel MD as Assigned PCP    The following health maintenance items are reviewed in Epic and correct as of today:  Health Maintenance   Topic Date Due    HEPATITIS A IMMUNIZATION (1 of 2 - Risk 2-dose series) Never done    ZOSTER IMMUNIZATION (1 of 2) Never done    HEPATITIS B  "IMMUNIZATION (1 of 3 - Risk 3-dose series) Never done    RSV VACCINE (Pregnancy & 60+) (1 - 1-dose 60+ series) Never done    DTAP/TDAP/TD IMMUNIZATION (1 - Tdap) 02/07/2018    EYE EXAM  05/17/2023    LIPID  04/27/2024    MICROALBUMIN  04/27/2024    BMP  10/26/2024    DIABETIC FOOT EXAM  10/26/2024    ANNUAL REVIEW OF HM ORDERS  10/26/2024    A1C  11/02/2024    MEDICARE ANNUAL WELLNESS VISIT  05/02/2025    FALL RISK ASSESSMENT  05/02/2025    HEMOGLOBIN  05/02/2025    ADVANCE CARE PLANNING  05/02/2029    PHQ-2 (once per calendar year)  Completed    INFLUENZA VACCINE  Completed    Pneumococcal Vaccine: 65+ Years  Completed    URINALYSIS  Completed    COVID-19 Vaccine  Completed    IPV IMMUNIZATION  Aged Out    HPV IMMUNIZATION  Aged Out    MENINGITIS IMMUNIZATION  Aged Out    RSV MONOCLONAL ANTIBODY  Aged Out        Objective    Exam  /73 (BP Location: Right arm, Patient Position: Sitting, Cuff Size: Adult Regular)   Pulse 70   Temp 98.1  F (36.7  C) (Oral)   Resp 10   Ht 1.537 m (5' 0.5\")   Wt 44.1 kg (97 lb 3.2 oz)   LMP  (LMP Unknown)   SpO2 98%   Breastfeeding No   BMI 18.67 kg/m     Estimated body mass index is 18.67 kg/m  as calculated from the following:    Height as of this encounter: 1.537 m (5' 0.5\").    Weight as of this encounter: 44.1 kg (97 lb 3.2 oz).    Physical Exam  General appearance - alert, well appearing, and in no distress and petite, frail and elderly  Mental status - normal mood, behavior, speech, dress, motor activity, and thought processes  Eyes - pupils equal and reactive, extraocular eye movements intact  Ears - bilateral TM's and external ear canals normal  Nose - normal and patent, no erythema, discharge   Mouth - mucous membranes moist, pharynx normal without lesions and wearing upper dentures  Neck - supple, no significant adenopathy, carotids upstroke normal bilaterally, no bruits, thyroid exam: thyroid is normal in size without nodules or tenderness  Chest - clear to " auscultation, no wheezes, rales or rhonchi, symmetric air entry  Heart - normal rate and regular rhythm, no murmurs noted  Abdomen - soft, nontender, nondistended, no masses or organomegaly  Breasts - not examined  Pelvic - examination not indicated  Back exam - full range of motion, no tenderness, palpable spasm or pain on motion, moving quite well for her age  Neurological - alert, oriented, normal speech, no focal findings or movement disorder noted, cranial nerves II through XII intact, DTR's normal and symmetric  Musculoskeletal - no joint tenderness, deformity or swelling  Extremities - peripheral pulses normal, no pedal edema, no clubbing or cyanosis  Skin - normal coloration and turgor, no rashes, no suspicious skin lesions noted          5/2/2024   Mini Cog   Mini-Cog Not Completed (choose reason) Language barrier and no  present              Signed Electronically by: Neelima Patel MD

## 2024-05-02 NOTE — PATIENT INSTRUCTIONS
Preventive Care Advice   This is general advice given by our system to help you stay healthy. However, your care team may have specific advice just for you. Please talk to your care team about your preventive care needs.  Nutrition  Eat 5 or more servings of fruits and vegetables each day.  Try wheat bread, brown rice and whole grain pasta (instead of white bread, rice, and pasta).  Get enough calcium and vitamin D. Check the label on foods and aim for 100% of the RDA (recommended daily allowance).  Lifestyle  Exercise at least 150 minutes each week   (30 minutes a day, 5 days a week).  Do muscle strengthening activities 2 days a week. These help control your weight and prevent disease.  No smoking.  Wear sunscreen to prevent skin cancer.  Have a dental exam and cleaning every 6 months.  Yearly exams  See your health care team every year to talk about:  Any changes in your health.  Any medicines your care team has prescribed.  Preventive care, family planning, and ways to prevent chronic diseases.  Shots (vaccines)   HPV shots (up to age 26), if you've never had them before.  Hepatitis B shots (up to age 59), if you've never had them before.  COVID-19 shot: Get this shot when it's due.  Flu shot: Get a flu shot every year.  Tetanus shot: Get a tetanus shot every 10 years.  Pneumococcal, hepatitis A, and RSV shots: Ask your care team if you need these based on your risk.  Shingles shot (for age 50 and up).  General health tests  Diabetes screening:  Starting at age 35, Get screened for diabetes at least every 3 years.  If you are younger than age 35, ask your care team if you should be screened for diabetes.  Cholesterol test: At age 39, start having a cholesterol test every 5 years, or more often if advised.  Bone density scan (DEXA): At age 50, ask your care team if you should have this scan for osteoporosis (brittle bones).  Hepatitis C: Get tested at least once in your life.  STIs (sexually transmitted  infections)  Before age 24: Ask your care team if you should be screened for STIs.  After age 24: Get screened for STIs if you're at risk. You are at risk for STIs (including HIV) if:  You are sexually active with more than one person.  You don't use condoms every time.  You or a partner was diagnosed with a sexually transmitted infection.  If you are at risk for HIV, ask about PrEP medicine to prevent HIV.  Get tested for HIV at least once in your life, whether you are at risk for HIV or not.  Cancer screening tests  Cervical cancer screening: If you have a cervix, begin getting regular cervical cancer screening tests at age 21. Most people who have regular screenings with normal results can stop after age 65. Talk about this with your provider.  Breast cancer scan (mammogram): If you've ever had breasts, begin having regular mammograms starting at age 40. This is a scan to check for breast cancer.  Colon cancer screening: It is important to start screening for colon cancer at age 45.  Have a colonoscopy test every 10 years (or more often if you're at risk) Or, ask your provider about stool tests like a FIT test every year or Cologuard test every 3 years.  To learn more about your testing options, visit: https://www.RateItAll/679999.pdf.  For help making a decision, visit: https://bit.ly/um39908.  Prostate cancer screening test: If you have a prostate and are age 55 to 69, ask your provider if you would benefit from a yearly prostate cancer screening test.  Lung cancer screening: If you are a current or former smoker age 50 to 80, ask your care team if ongoing lung cancer screenings are right for you.  For informational purposes only. Not to replace the advice of your health care provider. Copyright   2023 [employer]. All rights reserved. Clinically reviewed by the [facility] Transitions Program. zuuka! 520592 - REV 01/24.    Hearing Loss: Care Instructions  Overview     Hearing loss is a  sudden or slow decrease in how well you hear. It can range from slight to profound. Permanent hearing loss can occur with aging. It also can happen when you are exposed long-term to loud noise. Examples include listening to loud music, riding motorcycles, or being around other loud machines.  Hearing loss can affect your work and home life. It can make you feel lonely or depressed. You may feel that you have lost your independence. But hearing aids and other devices can help you hear better and feel connected to others.  Follow-up care is a key part of your treatment and safety. Be sure to make and go to all appointments, and call your doctor if you are having problems. It's also a good idea to know your test results and keep a list of the medicines you take.  How can you care for yourself at home?  Avoid loud noises whenever possible. This helps keep your hearing from getting worse.  Always wear hearing protection around loud noises.  Wear a hearing aid as directed.  A professional can help you pick a hearing aid that will work best for you.  You can also get hearing aids over the counter for mild to moderate hearing loss.  Have hearing tests as your doctor suggests. They can show whether your hearing has changed. Your hearing aid may need to be adjusted.  Use other devices as needed. These may include:  Telephone amplifiers and hearing aids that can connect to a television, stereo, radio, or microphone.  Devices that use lights or vibrations. These alert you to the doorbell, a ringing telephone, or a baby monitor.  Television closed-captioning. This shows the words at the bottom of the screen. Most new TVs can do this.  TTY (text telephone). This lets you type messages back and forth on the telephone instead of talking or listening. These devices are also called TDD. When messages are typed on the keyboard, they are sent over the phone line to a receiving TTY. The message is shown on a monitor.  Use text  "messaging, social media, and email if it is hard for you to communicate by telephone.  Try to learn a listening technique called speechreading. It is not lipreading. You pay attention to people's gestures, expressions, posture, and tone of voice. These clues can help you understand what a person is saying. Face the person you are talking to, and have them face you. Make sure the lighting is good. You need to see the other person's face clearly.  Think about counseling if you need help to adjust to your hearing loss.  When should you call for help?  Watch closely for changes in your health, and be sure to contact your doctor if:    You think your hearing is getting worse.     You have new symptoms, such as dizziness or nausea.   Where can you learn more?  Go to https://www.uAfrica.net/patiented  Enter R798 in the search box to learn more about \"Hearing Loss: Care Instructions.\"  Current as of: September 27, 2023               Content Version: 14.0    7669-7599 [employer].   Care instructions adapted under license by your healthcare professional. If you have questions about a medical condition or this instruction, always ask your healthcare professional. [employer] disclaims any warranty or liability for your use of this information.      Learning About Stress  What is stress?     Stress is your body's response to a hard situation. Your body can have a physical, emotional, or mental response. Stress is a fact of life for most people, and it affects everyone differently. What causes stress for you may not be stressful for someone else.  A lot of things can cause stress. You may feel stress when you go on a job interview, take a test, or run a race. This kind of short-term stress is normal and even useful. It can help you if you need to work hard or react quickly. For example, stress can help you finish an important job on time.  Long-term stress is caused by ongoing stressful situations " or events. Examples of long-term stress include long-term health problems, ongoing problems at work, or conflicts in your family. Long-term stress can harm your health.  How does stress affect your health?  When you are stressed, your body responds as though you are in danger. It makes hormones that speed up your heart, make you breathe faster, and give you a burst of energy. This is called the fight-or-flight stress response. If the stress is over quickly, your body goes back to normal and no harm is done.  But if stress happens too often or lasts too long, it can have bad effects. Long-term stress can make you more likely to get sick, and it can make symptoms of some diseases worse. If you tense up when you are stressed, you may develop neck, shoulder, or low back pain. Stress is linked to high blood pressure and heart disease.  Stress also harms your emotional health. It can make you white, tense, or depressed. Your relationships may suffer, and you may not do well at work or school.  What can you do to manage stress?  You can try these things to help manage stress:   Do something active. Exercise or activity can help reduce stress. Walking is a great way to get started. Even everyday activities such as housecleaning or yard work can help.  Try yoga or reinier chi. These techniques combine exercise and meditation. You may need some training at first to learn them.  Do something you enjoy. For example, listen to music or go to a movie. Practice your hobby or do volunteer work.  Meditate. This can help you relax, because you are not worrying about what happened before or what may happen in the future.  Do guided imagery. Imagine yourself in any setting that helps you feel calm. You can use online videos, books, or a teacher to guide you.  Do breathing exercises. For example:  From a standing position, bend forward from the waist with your knees slightly bent. Let your arms dangle close to the floor.  Breathe in slowly  "and deeply as you return to a standing position. Roll up slowly and lift your head last.  Hold your breath for just a few seconds in the standing position.  Breathe out slowly and bend forward from the waist.  Let your feelings out. Talk, laugh, cry, and express anger when you need to. Talking with supportive friends or family, a counselor, or a dmitry leader about your feelings is a healthy way to relieve stress. Avoid discussing your feelings with people who make you feel worse.  Write. It may help to write about things that are bothering you. This helps you find out how much stress you feel and what is causing it. When you know this, you can find better ways to cope.  What can you do to prevent stress?  You might try some of these things to help prevent stress:  Manage your time. This helps you find time to do the things you want and need to do.  Get enough sleep. Your body recovers from the stresses of the day while you are sleeping.  Get support. Your family, friends, and community can make a difference in how you experience stress.  Limit your news feed. Avoid or limit time on social media or news that may make you feel stressed.  Do something active. Exercise or activity can help reduce stress. Walking is a great way to get started.  Where can you learn more?  Go to https://www.Tripleseat.net/patiented  Enter N032 in the search box to learn more about \"Learning About Stress.\"  Current as of: October 24, 2023               Content Version: 14.0    8173-3980 [employer].   Care instructions adapted under license by your healthcare professional. If you have questions about a medical condition or this instruction, always ask your healthcare professional. [employer] disclaims any warranty or liability for your use of this information.      "

## 2024-05-04 LAB
ALBUMIN SERPL BCG-MCNC: 3.9 G/DL (ref 3.5–5.2)
ALP SERPL-CCNC: 62 U/L (ref 40–150)
ALT SERPL W P-5'-P-CCNC: 21 U/L (ref 0–50)
ANION GAP SERPL CALCULATED.3IONS-SCNC: 12 MMOL/L (ref 7–15)
AST SERPL W P-5'-P-CCNC: 29 U/L (ref 0–45)
BILIRUB SERPL-MCNC: 0.3 MG/DL
BUN SERPL-MCNC: 14 MG/DL (ref 8–23)
CALCIUM SERPL-MCNC: 9.6 MG/DL (ref 8.8–10.2)
CHLORIDE SERPL-SCNC: 102 MMOL/L (ref 98–107)
CHOLEST SERPL-MCNC: 149 MG/DL
CREAT SERPL-MCNC: 0.98 MG/DL (ref 0.51–0.95)
CREAT UR-MCNC: 160 MG/DL
DEPRECATED HCO3 PLAS-SCNC: 25 MMOL/L (ref 22–29)
EGFRCR SERPLBLD CKD-EPI 2021: 56 ML/MIN/1.73M2
FASTING STATUS PATIENT QL REPORTED: ABNORMAL
GLUCOSE SERPL-MCNC: 167 MG/DL (ref 70–99)
HDLC SERPL-MCNC: 47 MG/DL
LDLC SERPL CALC-MCNC: 59 MG/DL
MICROALBUMIN UR-MCNC: 39.2 MG/L
MICROALBUMIN/CREAT UR: 24.5 MG/G CR (ref 0–25)
NONHDLC SERPL-MCNC: 102 MG/DL
POTASSIUM SERPL-SCNC: 4.2 MMOL/L (ref 3.4–5.3)
PROT SERPL-MCNC: 7.9 G/DL (ref 6.4–8.3)
SODIUM SERPL-SCNC: 139 MMOL/L (ref 135–145)
TRIGL SERPL-MCNC: 215 MG/DL

## 2024-06-05 NOTE — PROGRESS NOTES
"AUDIOLOGY REPORT    SUBJECTIVE:  Myron Meng is a 88 year old female who was seen in the Audiology Clinic at the [facility] for audiologic evaluation, referred by Self. 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 They were accompanied today by their daughter.} No: No in person  available. Called  phone line 4 times and when selecting \"other\" language it would say the call was dropped.  Patient's daughter served as  today.       Today daughter reports patient's hearing has decreased since last visit. Previously had vertigo that was treated and has resolved per daughter. Denied otalgia, otorrhea, aural fullness, history of ear surgery, loud noise exposure, family history of hearing loss, and previous hearing aid use. Last audiology note states patient tried a hearing aid in [place].       OBJECTIVE:  Abuse Screening:  Did not administer    Fall Risk Screen:  Did not administer    Otoscopic exam indicates non-occluding cerumen in the right, and clear canal left.     Pure Tone Thresholds assessed using conventional audiometry with good reliability from 250-8000 Hz bilaterally using insert earphones and circumaural headphones.     RIGHT:  Moderate sloping to severe SNHL     LEFT:  Moderate sloping to profound SNHL in the left ear.    Tympanogram:    RIGHT: Normal eardrum mobility    LEFT:   Hypermobile    Reflexes (reported by stimulus ear):  RIGHT: Ipsilateral is present at normal levels  RIGHT: Contralateral is unable to test contra due to equipment limitations.   LEFT:   Ipsilateral is absent at frequencies tested  LEFT:   Contralateral is unable to test contra due to equipment limitations.     Speech Detection Threshold (SDT):    RIGHT: 55 dB HL    LEFT:   55 dB HL    Word Recognition Score:      Unable to test word rec due to no " Ivonne word list available.     ASSESSMENT:   Today's results reveal moderate sloping to severe SNHL in the right ear, and moderate sloping to profound SNHL in the left ear. Compared to their previous audiogram dated 12/4/2018, hearing has declined from 250-500 Hz in both ears. Today s results were discussed with the patient and her daughter in detail. Briefly discussed hearing aids, previous poor word recognition scores, and trial period. They were provided with a copy of their hearing test today.     PLAN: Hearing aid consult pending patient motivation, did discuss previous poor word recognition scores from 2018. Patient's daughter will look into patient's insurance benefits for hearing aids. Repeat audio in 102 years, sooner if new concerns arise.  Please call this clinic with questions regarding these results or recommendations.    Wm Rabago., CCC-A  [place] Licensed Audiologist #[id number]

## 2024-06-07 ENCOUNTER — OFFICE VISIT (OUTPATIENT)
Dept: AUDIOLOGY | Facility: CLINIC | Age: 89
End: 2024-06-07
Payer: COMMERCIAL

## 2024-06-07 DIAGNOSIS — H90.3 SENSORINEURAL HEARING LOSS (SNHL) OF BOTH EARS: Primary | ICD-10-CM

## 2024-06-07 PROCEDURE — 92557 COMPREHENSIVE HEARING TEST: CPT | Mod: 52 | Performed by: AUDIOLOGIST

## 2024-06-07 PROCEDURE — 92550 TYMPANOMETRY & REFLEX THRESH: CPT | Mod: 52 | Performed by: AUDIOLOGIST

## 2024-07-02 ENCOUNTER — TELEPHONE (OUTPATIENT)
Dept: FAMILY MEDICINE | Facility: CLINIC | Age: 89
End: 2024-07-02
Payer: COMMERCIAL

## 2024-07-02 NOTE — TELEPHONE ENCOUNTER
Diannar received a request from Clinic Care Coordinator RN to schedule patient for ear wax cleaning to have done prior to getting hearing aides fitted.  Requested to call Son in Law.  Writer called and scheduled with provider for next week.    DONTRELL Ignacio RN  [facility]

## 2024-07-11 ENCOUNTER — OFFICE VISIT (OUTPATIENT)
Dept: FAMILY MEDICINE | Facility: CLINIC | Age: 89
End: 2024-07-11
Payer: COMMERCIAL

## 2024-07-11 VITALS
RESPIRATION RATE: 18 BRPM | WEIGHT: 97.2 LBS | HEIGHT: 61 IN | TEMPERATURE: 98 F | HEART RATE: 71 BPM | BODY MASS INDEX: 18.35 KG/M2 | SYSTOLIC BLOOD PRESSURE: 122 MMHG | OXYGEN SATURATION: 97 % | DIASTOLIC BLOOD PRESSURE: 82 MMHG

## 2024-07-11 DIAGNOSIS — H61.21 EXCESSIVE CERUMEN IN EAR CANAL, RIGHT: ICD-10-CM

## 2024-07-11 DIAGNOSIS — H91.93 DECREASED HEARING OF BOTH EARS: Primary | ICD-10-CM

## 2024-07-11 PROCEDURE — 99213 OFFICE O/P EST LOW 20 MIN: CPT | Performed by: FAMILY MEDICINE

## 2024-07-11 RX ORDER — RESPIRATORY SYNCYTIAL VIRUS VACCINE 120MCG/0.5
0.5 KIT INTRAMUSCULAR ONCE
Qty: 1 EACH | Refills: 0 | Status: CANCELLED | OUTPATIENT
Start: 2024-07-11 | End: 2024-07-11

## 2024-07-11 ASSESSMENT — PAIN SCALES - GENERAL: PAINLEVEL: NO PAIN (0)

## 2024-07-11 NOTE — PROGRESS NOTES
"  Assessment & Plan     Decreased hearing of both ears  Patient is having hearing loss bilaterally but worst in her right ear which is also the ear with a small amount of wax.  They are planning to have hearing aids fitted.    Excessive cerumen in ear canal, right  I agree with the assessment of the ear exam from the audiologist.  Although there is cerumen it is not occluding and should be easily removed.  Nursing was instructed to rinse the right ear out.    I rechecked the ear and it was completely cleared of wax.  I again checked the left ear and again reassured the son-in-law that both ears were completely clear of wax.      FUTURE APPOINTMENTS:       - Follow-up visit in early November for her diabetes check.  Already scheduled.      Sergo Sanches is a 88 year old, presenting for the following health issues:  Ear Problem (Wax removal)      7/11/2024     4:59 PM   Additional Questions   Roomed by Ritu Martinez CMA     Ear Problem    History of Present Illness       Reason for visit:  Wax in the ears    She eats 2-3 servings of fruits and vegetables daily.She consumes 0 sweetened beverage(s) daily.   She is taking medications regularly.     Patient was seen on 6/7/2023 by the audiologist at [facility] follow-up on her sensorineural hearing loss.  Apparently she was having worsening hearing loss so her daughter brought her in.  She was tested and it was found that she has severe hearing loss in the right ear but both ears were worse than her previous check in 2018.  Today she is here with her son-in-law and he tells me that in order to get fitted for hearing aids she was told she needed to get her right ear washed out.      Objective    /82 (BP Location: Left arm, Patient Position: Sitting, Cuff Size: Adult Regular)   Pulse 71   Temp 98  F (36.7  C) (Oral)   Resp 18   Ht 1.537 m (5' 0.5\")   Wt 44.1 kg (97 lb 3.2 oz)   LMP  (LMP Unknown)   SpO2 97%   BMI 18.67 kg/m    Body mass index is 18.67 " kg/m .  Physical Exam   GENERAL: healthy, alert, no distress, and elderly  EYES: Eyes grossly normal to inspection  HENT: normal cephalic/atraumatic, right ear: Moderate amount of nonoccluding cerumen noted, left ear: normal: no effusions, no erythema, normal landmarks and clear of wax, and nose and mouth without ulcers or lesions          Signed Electronically by: Neelima Patel MD

## 2024-09-17 DIAGNOSIS — I10 BENIGN ESSENTIAL HYPERTENSION: Chronic | ICD-10-CM

## 2024-09-18 RX ORDER — LISINOPRIL 30 MG/1
30 TABLET ORAL DAILY
Qty: 90 TABLET | Refills: 1 | Status: SHIPPED | OUTPATIENT
Start: 2024-09-18

## 2024-11-14 PROBLEM — E11.22 TYPE 2 DIABETES MELLITUS WITH STAGE 3A CHRONIC KIDNEY DISEASE, WITHOUT LONG-TERM CURRENT USE OF INSULIN (H): Chronic | Status: ACTIVE | Noted: 2021-11-04

## 2024-11-14 PROBLEM — N18.31 TYPE 2 DIABETES MELLITUS WITH STAGE 3A CHRONIC KIDNEY DISEASE, WITHOUT LONG-TERM CURRENT USE OF INSULIN (H): Chronic | Status: ACTIVE | Noted: 2021-11-04

## 2024-12-12 ENCOUNTER — OFFICE VISIT (OUTPATIENT)
Dept: FAMILY MEDICINE | Facility: CLINIC | Age: 89
End: 2024-12-12
Payer: COMMERCIAL

## 2024-12-12 VITALS
RESPIRATION RATE: 16 BRPM | TEMPERATURE: 98.4 F | HEIGHT: 61 IN | WEIGHT: 97 LBS | OXYGEN SATURATION: 99 % | SYSTOLIC BLOOD PRESSURE: 163 MMHG | HEART RATE: 60 BPM | BODY MASS INDEX: 18.31 KG/M2 | DIASTOLIC BLOOD PRESSURE: 73 MMHG

## 2024-12-12 DIAGNOSIS — E11.22 TYPE 2 DIABETES MELLITUS WITH STAGE 3A CHRONIC KIDNEY DISEASE, WITHOUT LONG-TERM CURRENT USE OF INSULIN (H): ICD-10-CM

## 2024-12-12 DIAGNOSIS — N18.31 STAGE 3A CHRONIC KIDNEY DISEASE (H): Chronic | ICD-10-CM

## 2024-12-12 DIAGNOSIS — Z23 IMMUNIZATION DUE: ICD-10-CM

## 2024-12-12 DIAGNOSIS — E11.22 TYPE 2 DIABETES MELLITUS WITH STAGE 3A CHRONIC KIDNEY DISEASE, WITHOUT LONG-TERM CURRENT USE OF INSULIN (H): Primary | Chronic | ICD-10-CM

## 2024-12-12 DIAGNOSIS — I10 BENIGN ESSENTIAL HYPERTENSION: Chronic | ICD-10-CM

## 2024-12-12 DIAGNOSIS — E78.2 MIXED HYPERLIPIDEMIA: Chronic | ICD-10-CM

## 2024-12-12 DIAGNOSIS — N18.31 TYPE 2 DIABETES MELLITUS WITH STAGE 3A CHRONIC KIDNEY DISEASE, WITHOUT LONG-TERM CURRENT USE OF INSULIN (H): ICD-10-CM

## 2024-12-12 DIAGNOSIS — I25.10 ATHEROSCLEROSIS OF NATIVE CORONARY ARTERY OF NATIVE HEART WITHOUT ANGINA PECTORIS: Chronic | ICD-10-CM

## 2024-12-12 DIAGNOSIS — K74.60 CIRRHOSIS OF LIVER WITHOUT ASCITES, UNSPECIFIED HEPATIC CIRRHOSIS TYPE (H): Chronic | ICD-10-CM

## 2024-12-12 DIAGNOSIS — N18.31 TYPE 2 DIABETES MELLITUS WITH STAGE 3A CHRONIC KIDNEY DISEASE, WITHOUT LONG-TERM CURRENT USE OF INSULIN (H): Primary | Chronic | ICD-10-CM

## 2024-12-12 DIAGNOSIS — M81.0 AGE-RELATED OSTEOPOROSIS WITHOUT CURRENT PATHOLOGICAL FRACTURE: Chronic | ICD-10-CM

## 2024-12-12 DIAGNOSIS — K21.9 GASTROESOPHAGEAL REFLUX DISEASE WITHOUT ESOPHAGITIS: Chronic | ICD-10-CM

## 2024-12-12 LAB
ERYTHROCYTE [DISTWIDTH] IN BLOOD BY AUTOMATED COUNT: 15.5 % (ref 10–15)
EST. AVERAGE GLUCOSE BLD GHB EST-MCNC: 163 MG/DL
HBA1C MFR BLD: 7.3 % (ref 0–5.6)
HCT VFR BLD AUTO: 35.5 % (ref 35–47)
HGB BLD-MCNC: 11.5 G/DL (ref 11.7–15.7)
MCH RBC QN AUTO: 28.7 PG (ref 26.5–33)
MCHC RBC AUTO-ENTMCNC: 32.4 G/DL (ref 31.5–36.5)
MCV RBC AUTO: 89 FL (ref 78–100)
PLATELET # BLD AUTO: 186 10E3/UL (ref 150–450)
RBC # BLD AUTO: 4.01 10E6/UL (ref 3.8–5.2)
WBC # BLD AUTO: 5.1 10E3/UL (ref 4–11)

## 2024-12-12 PROCEDURE — 36415 COLL VENOUS BLD VENIPUNCTURE: CPT | Performed by: FAMILY MEDICINE

## 2024-12-12 PROCEDURE — 99214 OFFICE O/P EST MOD 30 MIN: CPT | Performed by: FAMILY MEDICINE

## 2024-12-12 PROCEDURE — G2211 COMPLEX E/M VISIT ADD ON: HCPCS | Performed by: FAMILY MEDICINE

## 2024-12-12 PROCEDURE — 85027 COMPLETE CBC AUTOMATED: CPT | Performed by: FAMILY MEDICINE

## 2024-12-12 PROCEDURE — 90662 IIV NO PRSV INCREASED AG IM: CPT | Performed by: FAMILY MEDICINE

## 2024-12-12 PROCEDURE — 80048 BASIC METABOLIC PNL TOTAL CA: CPT | Performed by: FAMILY MEDICINE

## 2024-12-12 PROCEDURE — 83036 HEMOGLOBIN GLYCOSYLATED A1C: CPT | Performed by: FAMILY MEDICINE

## 2024-12-12 PROCEDURE — G0008 ADMIN INFLUENZA VIRUS VAC: HCPCS | Performed by: FAMILY MEDICINE

## 2024-12-12 PROCEDURE — 80061 LIPID PANEL: CPT | Performed by: FAMILY MEDICINE

## 2024-12-12 ASSESSMENT — PAIN SCALES - GENERAL: PAINLEVEL_OUTOF10: NO PAIN (0)

## 2024-12-12 NOTE — PROGRESS NOTES
Assessment & Plan     Type 2 diabetes mellitus with stage 3a chronic kidney disease, without long-term current use of insulin (H)  Has been well-controlled on metformin only.  Last A1c was 6.9%.  Needs eye exam.  Foot exam done today.  - Hemoglobin A1c  - OPTOMETRY REFERRAL  - Hemoglobin A1c    Atherosclerosis of native coronary artery of native heart without angina pectoris  This has been stable for a long time.  No symptoms.    Cirrhosis of liver without ascites, unspecified hepatic cirrhosis type (H)  This has also been stable and of unknown origin.    Gastroesophageal reflux disease without esophagitis  This has been chronic and is treated with pantoprazole.    Benign essential hypertension  Uncontrolled today with blood pressures of 156/71 and 163/73.  This is because she did not take her medicine today.  Will check labs.  May need to have her come in for a nurse only blood pressure check later.  - Basic metabolic panel  - Basic metabolic panel    Stage 3a chronic kidney disease (H)  This has been stable/mild with a GFR of around 56.  Will recheck and check hemoglobin as well.  - CBC with platelets  - CBC with platelets    Mixed hyperlipidemia  On simvastatin 20 mg with last LDL of 59.  Will recheck.  - Lipid panel reflex to direct LDL Non-fasting  - Lipid panel reflex to direct LDL Non-fasting    Age-related osteoporosis without current pathological fracture  This has been ongoing and probably worsened with the need for PPI use.  Currently the patient is not a large fall risk despite her age.    Immunization due  Declines COVID vaccination but will take the flu shot.  - INFLUENZA HIGH DOSE, TRIVALENT, PF (FLUZONE)      I will get back to them on lab results by [facility] or mail and only call with grossly abnormal values.  Patient may need to come in for recheck of her blood pressure.    FUTURE APPOINTMENTS:       - Follow-up visit in spring time/May for her annual wellness visit..        Subjective   Promila  "is a 88 year old, presenting for the following health issues:  Diabetes        12/12/2024     4:36 PM   Additional Questions   Roomed by beverly armijo cma     History of Present Illness       Diabetes:   She presents for follow up of diabetes.    She is not checking blood glucose.         She has no concerns regarding her diabetes at this time.   She is not experiencing numbness or burning in feet, excessive thirst, blurry vision, weight changes or redness, sores or blisters on feet. The patient has not had a diabetic eye exam in the last 12 months.          Hyperlipidemia:  She presents for follow up of hyperlipidemia.   She is taking medication to lower cholesterol. She is not having myalgia or other side effects to statin medications.    Hypertension: She presents for follow up of hypertension.  She does not check blood pressure  regularly outside of the clinic. Outpatient blood pressures have not been over 140/90. She follows a low salt diet.     She eats 0-1 servings of fruits and vegetables daily.She consumes 0 sweetened beverage(s) daily.She exercises with enough effort to increase her heart rate 20 to 29 minutes per day.  She exercises with enough effort to increase her heart rate 4 days per week.   She is taking medications regularly.     I last saw the patient for a med check on 5/2 had an annual wellness visit but did see her in mid July for a complaint about her ears.  She is here today with her son-in-law.  He tells me that she did not take her meds today.  Her blood pressure is high.  She is typically a well-controlled type II diabetic with a history of heart disease and mild liver disease.  She has not done her eye exam this year.  She is due for her foot exam.  She is also due for her flu and COVID-vaccines.      Objective    BP (!) 163/73   Pulse 60   Temp 98.4  F (36.9  C)   Resp 16   Ht 1.537 m (5' 0.5\")   Wt 44 kg (97 lb)   LMP  (LMP Unknown)   SpO2 99%   BMI 18.63 kg/m    Body mass index is " 18.63 kg/m .  Physical Exam   GENERAL: healthy, alert, no distress, frail, and elderly  RESP: lungs clear to auscultation - no rales, rhonchi or wheezes  CV: regular rates and rhythm  ABDOMEN: soft, nontender  MS: no gross musculoskeletal defects noted, no edema  Diabetic foot exam: No deformity of the foot, skin and nails are healthy, good pulses, no neuropathy on filament testing.    Results for orders placed or performed in visit on 12/12/24   Hemoglobin A1c     Status: Abnormal   Result Value Ref Range    Estimated Average Glucose 163 (H) <117 mg/dL    Hemoglobin A1C 7.3 (H) 0.0 - 5.6 %   CBC with platelets     Status: Abnormal   Result Value Ref Range    WBC Count 5.1 4.0 - 11.0 10e3/uL    RBC Count 4.01 3.80 - 5.20 10e6/uL    Hemoglobin 11.5 (L) 11.7 - 15.7 g/dL    Hematocrit 35.5 35.0 - 47.0 %    MCV 89 78 - 100 fL    MCH 28.7 26.5 - 33.0 pg    MCHC 32.4 31.5 - 36.5 g/dL    RDW 15.5 (H) 10.0 - 15.0 %    Platelet Count 186 150 - 450 10e3/uL   Lipid panel reflex to direct LDL Non-fasting     Status: Abnormal   Result Value Ref Range    Cholesterol 156 <200 mg/dL    Triglycerides 227 (H) <150 mg/dL    Direct Measure HDL 47 (L) >=50 mg/dL    LDL Cholesterol Calculated 64 <100 mg/dL    Non HDL Cholesterol 109 <130 mg/dL    Patient Fasting > 8hrs? No     Narrative    Cholesterol  Desirable: < 200 mg/dL  Borderline High: 200 - 239 mg/dL  High: >= 240 mg/dL    Triglycerides  Normal: < 150 mg/dL  Borderline High: 150 - 199 mg/dL  High: 200-499 mg/dL  Very High: >= 500 mg/dL    Direct Measure HDL  Female: >= 50 mg/dL   Male: >= 40 mg/dL    LDL Cholesterol  Desirable: < 100 mg/dL  Above Desirable: 100 - 129 mg/dL   Borderline High: 130 - 159 mg/dL   High:  160 - 189 mg/dL   Very High: >= 190 mg/dL    Non HDL Cholesterol  Desirable: < 130 mg/dL  Above Desirable: 130 - 159 mg/dL  Borderline High: 160 - 189 mg/dL  High: 190 - 219 mg/dL  Very High: >= 220 mg/dL   Basic metabolic panel     Status: Abnormal   Result Value Ref  Range    Sodium 140 135 - 145 mmol/L    Potassium 3.8 3.4 - 5.3 mmol/L    Chloride 104 98 - 107 mmol/L    Carbon Dioxide (CO2) 27 22 - 29 mmol/L    Anion Gap 9 7 - 15 mmol/L    Urea Nitrogen 16.4 8.0 - 23.0 mg/dL    Creatinine 1.03 (H) 0.51 - 0.95 mg/dL    GFR Estimate 52 (L) >60 mL/min/1.73m2    Calcium 9.1 8.8 - 10.4 mg/dL    Glucose 172 (H) 70 - 99 mg/dL    Patient Fasting > 8hrs? No            Signed Electronically by: Neelima Patel MD

## 2024-12-13 LAB
ANION GAP SERPL CALCULATED.3IONS-SCNC: 9 MMOL/L (ref 7–15)
BUN SERPL-MCNC: 16.4 MG/DL (ref 8–23)
CALCIUM SERPL-MCNC: 9.1 MG/DL (ref 8.8–10.4)
CHLORIDE SERPL-SCNC: 104 MMOL/L (ref 98–107)
CHOLEST SERPL-MCNC: 156 MG/DL
CREAT SERPL-MCNC: 1.03 MG/DL (ref 0.51–0.95)
EGFRCR SERPLBLD CKD-EPI 2021: 52 ML/MIN/1.73M2
FASTING STATUS PATIENT QL REPORTED: NO
FASTING STATUS PATIENT QL REPORTED: NO
GLUCOSE SERPL-MCNC: 172 MG/DL (ref 70–99)
HCO3 SERPL-SCNC: 27 MMOL/L (ref 22–29)
HDLC SERPL-MCNC: 47 MG/DL
LDLC SERPL CALC-MCNC: 64 MG/DL
NONHDLC SERPL-MCNC: 109 MG/DL
POTASSIUM SERPL-SCNC: 3.8 MMOL/L (ref 3.4–5.3)
SODIUM SERPL-SCNC: 140 MMOL/L (ref 135–145)
TRIGL SERPL-MCNC: 227 MG/DL

## 2025-03-29 DIAGNOSIS — I10 BENIGN ESSENTIAL HYPERTENSION: Chronic | ICD-10-CM

## 2025-03-31 RX ORDER — METOPROLOL SUCCINATE 100 MG/1
100 TABLET, EXTENDED RELEASE ORAL
Qty: 90 TABLET | Refills: 1 | Status: SHIPPED | OUTPATIENT
Start: 2025-03-31

## 2025-05-07 DIAGNOSIS — K21.9 GASTROESOPHAGEAL REFLUX DISEASE WITHOUT ESOPHAGITIS: Chronic | ICD-10-CM

## 2025-05-07 RX ORDER — PANTOPRAZOLE SODIUM 40 MG/1
40 TABLET, DELAYED RELEASE ORAL
Qty: 90 TABLET | Refills: 0 | Status: SHIPPED | OUTPATIENT
Start: 2025-05-07

## 2025-05-07 NOTE — TELEPHONE ENCOUNTER
Incoming call from son in law Alex- [facility] on file. He reports Myron is taking this medication as prescribed. Confirms she took a break from the medication but started taking this again about 2 months ago.     Routing refill request to prescribing provider

## 2025-05-07 NOTE — TELEPHONE ENCOUNTER
#1 Attempted to contact patient. No Answer. LMTCB    There is a break in medication. Please contact patient and confirm reason for gap. Pt should be out since November 2024. Please route reason for refill request and encounter to prescribing provider for review.    Brock JON RN

## 2025-05-08 DIAGNOSIS — H04.123 DRY EYES: ICD-10-CM

## 2025-05-08 RX ORDER — CYCLOSPORINE 0.5 MG/ML
1 EMULSION OPHTHALMIC 2 TIMES DAILY
Qty: 180 ML | Refills: 1 | OUTPATIENT
Start: 2025-05-08

## 2025-05-08 NOTE — TELEPHONE ENCOUNTER
This is a specialist prescription,  ophthalmologist.  Patient is overdue for her diabetic eye exam.  They should make an appointment and then get this medication filled through the ophthalmologist's office.

## 2025-05-08 NOTE — TELEPHONE ENCOUNTER
Spoke with the patient's son in law to relay the provider's message. He verbalized understanding and then asked to set up the patient's 6 month follow up with her PCP. This was done and he will contact her ophthalmologist.    Rigo Vasquez RN  [facility]

## 2025-05-26 SDOH — HEALTH STABILITY: PHYSICAL HEALTH: ON AVERAGE, HOW MANY DAYS PER WEEK DO YOU ENGAGE IN MODERATE TO STRENUOUS EXERCISE (LIKE A BRISK WALK)?: 0 DAYS

## 2025-05-26 ASSESSMENT — SOCIAL DETERMINANTS OF HEALTH (SDOH): HOW OFTEN DO YOU GET TOGETHER WITH FRIENDS OR RELATIVES?: ONCE A WEEK

## 2025-05-29 ENCOUNTER — OFFICE VISIT (OUTPATIENT)
Dept: FAMILY MEDICINE | Facility: CLINIC | Age: 89
End: 2025-05-29
Payer: COMMERCIAL

## 2025-05-29 VITALS
HEART RATE: 64 BPM | RESPIRATION RATE: 10 BRPM | DIASTOLIC BLOOD PRESSURE: 81 MMHG | OXYGEN SATURATION: 98 % | TEMPERATURE: 97.9 F | HEIGHT: 60 IN | BODY MASS INDEX: 19.61 KG/M2 | SYSTOLIC BLOOD PRESSURE: 150 MMHG | WEIGHT: 99.9 LBS

## 2025-05-29 DIAGNOSIS — K21.9 GASTROESOPHAGEAL REFLUX DISEASE WITHOUT ESOPHAGITIS: Chronic | ICD-10-CM

## 2025-05-29 DIAGNOSIS — D64.9 ANEMIA, UNSPECIFIED TYPE: ICD-10-CM

## 2025-05-29 DIAGNOSIS — M25.552 BILATERAL HIP PAIN: ICD-10-CM

## 2025-05-29 DIAGNOSIS — E11.22 TYPE 2 DIABETES MELLITUS WITH STAGE 3A CHRONIC KIDNEY DISEASE, WITHOUT LONG-TERM CURRENT USE OF INSULIN (H): ICD-10-CM

## 2025-05-29 DIAGNOSIS — N18.31 TYPE 2 DIABETES MELLITUS WITH STAGE 3A CHRONIC KIDNEY DISEASE, WITHOUT LONG-TERM CURRENT USE OF INSULIN (H): ICD-10-CM

## 2025-05-29 DIAGNOSIS — K76.6 PORTAL HYPERTENSION (H): Chronic | ICD-10-CM

## 2025-05-29 DIAGNOSIS — H04.123 DRY EYES: ICD-10-CM

## 2025-05-29 DIAGNOSIS — I10 BENIGN ESSENTIAL HYPERTENSION: Chronic | ICD-10-CM

## 2025-05-29 DIAGNOSIS — G89.29 CHRONIC BILATERAL LOW BACK PAIN WITH RIGHT-SIDED SCIATICA: ICD-10-CM

## 2025-05-29 DIAGNOSIS — I25.10 ATHEROSCLEROSIS OF NATIVE CORONARY ARTERY OF NATIVE HEART WITHOUT ANGINA PECTORIS: Chronic | ICD-10-CM

## 2025-05-29 DIAGNOSIS — N18.31 STAGE 3A CHRONIC KIDNEY DISEASE (H): Chronic | ICD-10-CM

## 2025-05-29 DIAGNOSIS — K74.60 CIRRHOSIS OF LIVER WITHOUT ASCITES, UNSPECIFIED HEPATIC CIRRHOSIS TYPE (H): Chronic | ICD-10-CM

## 2025-05-29 DIAGNOSIS — M54.41 CHRONIC BILATERAL LOW BACK PAIN WITH RIGHT-SIDED SCIATICA: ICD-10-CM

## 2025-05-29 DIAGNOSIS — M25.551 BILATERAL HIP PAIN: ICD-10-CM

## 2025-05-29 DIAGNOSIS — M81.0 AGE-RELATED OSTEOPOROSIS WITHOUT CURRENT PATHOLOGICAL FRACTURE: Chronic | ICD-10-CM

## 2025-05-29 DIAGNOSIS — Z00.00 ENCOUNTER FOR MEDICARE ANNUAL WELLNESS EXAM: Primary | ICD-10-CM

## 2025-05-29 DIAGNOSIS — E78.2 MIXED HYPERLIPIDEMIA: Chronic | ICD-10-CM

## 2025-05-29 LAB
EST. AVERAGE GLUCOSE BLD GHB EST-MCNC: 180 MG/DL
HBA1C MFR BLD: 7.9 % (ref 0–5.6)

## 2025-05-29 PROCEDURE — 3077F SYST BP >= 140 MM HG: CPT | Performed by: FAMILY MEDICINE

## 2025-05-29 PROCEDURE — 83036 HEMOGLOBIN GLYCOSYLATED A1C: CPT | Performed by: FAMILY MEDICINE

## 2025-05-29 PROCEDURE — G0439 PPPS, SUBSEQ VISIT: HCPCS | Performed by: FAMILY MEDICINE

## 2025-05-29 PROCEDURE — 80053 COMPREHEN METABOLIC PANEL: CPT | Performed by: FAMILY MEDICINE

## 2025-05-29 PROCEDURE — 82043 UR ALBUMIN QUANTITATIVE: CPT | Performed by: FAMILY MEDICINE

## 2025-05-29 PROCEDURE — 80061 LIPID PANEL: CPT | Performed by: FAMILY MEDICINE

## 2025-05-29 PROCEDURE — 99214 OFFICE O/P EST MOD 30 MIN: CPT | Mod: 25 | Performed by: FAMILY MEDICINE

## 2025-05-29 PROCEDURE — 3079F DIAST BP 80-89 MM HG: CPT | Performed by: FAMILY MEDICINE

## 2025-05-29 PROCEDURE — 36415 COLL VENOUS BLD VENIPUNCTURE: CPT | Performed by: FAMILY MEDICINE

## 2025-05-29 PROCEDURE — 82570 ASSAY OF URINE CREATININE: CPT | Performed by: FAMILY MEDICINE

## 2025-05-29 PROCEDURE — 1125F AMNT PAIN NOTED PAIN PRSNT: CPT | Performed by: FAMILY MEDICINE

## 2025-05-29 RX ORDER — OXYCODONE HYDROCHLORIDE 5 MG/1
5 TABLET ORAL
Qty: 6 TABLET | Refills: 0 | Status: SHIPPED | OUTPATIENT
Start: 2025-05-29 | End: 2025-06-04

## 2025-05-29 RX ORDER — CYCLOSPORINE 0.5 MG/ML
1 EMULSION OPHTHALMIC 2 TIMES DAILY
Qty: 180 ML | Refills: 1 | Status: SHIPPED | OUTPATIENT
Start: 2025-05-29

## 2025-05-29 ASSESSMENT — PAIN SCALES - GENERAL: PAINLEVEL_OUTOF10: MODERATE PAIN (6)

## 2025-05-29 NOTE — PATIENT INSTRUCTIONS
Patient Education   Preventive Care Advice   This is general advice given by our system to help you stay healthy. However, your care team may have specific advice just for you. Please talk to your care team about your preventive care needs.  Nutrition  Eat 5 or more servings of fruits and vegetables each day.  Try wheat bread, brown rice and whole grain pasta (instead of white bread, rice, and pasta).  Get enough calcium and vitamin D. Check the label on foods and aim for 100% of the RDA (recommended daily allowance).  Lifestyle  Exercise at least 150 minutes each week  (30 minutes a day, 5 days a week).  Do muscle strengthening activities 2 days a week. These help control your weight and prevent disease.  No smoking.  Wear sunscreen to prevent skin cancer.  Have a dental exam and cleaning every 6 months.  Yearly exams  See your health care team every year to talk about:  Any changes in your health.  Any medicines your care team has prescribed.  Preventive care, family planning, and ways to prevent chronic diseases.  Shots (vaccines)   HPV shots (up to age 26), if you've never had them before.  Hepatitis B shots (up to age 59), if you've never had them before.  COVID-19 shot: Get this shot when it's due.  Flu shot: Get a flu shot every year.  Tetanus shot: Get a tetanus shot every 10 years.  Pneumococcal, hepatitis A, and RSV shots: Ask your care team if you need these based on your risk.  Shingles shot (for age 50 and up)  General health tests  Diabetes screening:  Starting at age 35, Get screened for diabetes at least every 3 years.  If you are younger than age 35, ask your care team if you should be screened for diabetes.  Cholesterol test: At age 39, start having a cholesterol test every 5 years, or more often if advised.  Bone density scan (DEXA): At age 50, ask your care team if you should have this scan for osteoporosis (brittle bones).  Hepatitis C: Get tested at least once in your life.  STIs (sexually  transmitted infections)  Before age 24: Ask your care team if you should be screened for STIs.  After age 24: Get screened for STIs if you're at risk. You are at risk for STIs (including HIV) if:  You are sexually active with more than one person.  You don't use condoms every time.  You or a partner was diagnosed with a sexually transmitted infection.  If you are at risk for HIV, ask about PrEP medicine to prevent HIV.  Get tested for HIV at least once in your life, whether you are at risk for HIV or not.  Cancer screening tests  Cervical cancer screening: If you have a cervix, begin getting regular cervical cancer screening tests starting at age 21.  Breast cancer scan (mammogram): If you've ever had breasts, begin having regular mammograms starting at age 40. This is a scan to check for breast cancer.  Colon cancer screening: It is important to start screening for colon cancer at age 45.  Have a colonoscopy test every 10 years (or more often if you're at risk) Or, ask your provider about stool tests like a FIT test every year or Cologuard test every 3 years.  To learn more about your testing options, visit:   .  For help making a decision, visit:   https://bit.ly/pd67974.  Prostate cancer screening test: If you have a prostate, ask your care team if a prostate cancer screening test (PSA) at age 55 is right for you.  Lung cancer screening: If you are a current or former smoker ages 50 to 80, ask your care team if ongoing lung cancer screenings are right for you.  For informational purposes only. Not to replace the advice of your health care provider. Copyright   2023 [facility] [employer]. All rights reserved. Clinically reviewed by the [facility]. [employer] 435463 - REV 01/24.  Hearing Loss: Care Instructions  Overview     Hearing loss is a sudden or slow decrease in how well you hear. It can range from slight to profound. Permanent hearing loss can occur with aging. It also can  happen when you are exposed long-term to loud noise. Examples include listening to loud music, riding motorcycles, or being around other loud machines.  Hearing loss can affect your work and home life. It can make you feel lonely or depressed. You may feel that you have lost your independence. But hearing aids and other devices can help you hear better and feel connected to others.  Follow-up care is a key part of your treatment and safety. Be sure to make and go to all appointments, and call your doctor if you are having problems. It's also a good idea to know your test results and keep a list of the medicines you take.  How can you care for yourself at home?  Avoid loud noises whenever possible. This helps keep your hearing from getting worse.  Always wear hearing protection around loud noises.  Wear a hearing aid as directed.  A professional can help you pick a hearing aid that will work best for you.  You can also get hearing aids over the counter for mild to moderate hearing loss.  Have hearing tests as your doctor suggests. They can show whether your hearing has changed. Your hearing aid may need to be adjusted.  Use other devices as needed. These may include:  Telephone amplifiers and hearing aids that can connect to a television, stereo, radio, or microphone.  Devices that use lights or vibrations. These alert you to the doorbell, a ringing telephone, or a baby monitor.  Television closed-captioning. This shows the words at the bottom of the screen. Most new TVs can do this.  TTY (text telephone). This lets you type messages back and forth on the telephone instead of talking or listening. These devices are also called TDD. When messages are typed on the keyboard, they are sent over the phone line to a receiving TTY. The message is shown on a monitor.  Use text messaging, social media, and email if it is hard for you to communicate by telephone.  Try to learn a listening technique called speechreading. It is  "not lipreading. You pay attention to people's gestures, expressions, posture, and tone of voice. These clues can help you understand what a person is saying. Face the person you are talking to, and have them face you. Make sure the lighting is good. You need to see the other person's face clearly.  Think about counseling if you need help to adjust to your hearing loss.  When should you call for help?  Watch closely for changes in your health, and be sure to contact your doctor if:    You think your hearing is getting worse.     You have new symptoms, such as dizziness or nausea.   Where can you learn more?  Go to https://www.[employer].net/patiented  Enter R798 in the search box to learn more about \"Hearing Loss: Care Instructions.\"  Current as of: October 27, 2024  Content Version: 14.4    9766-0161 [employer].   Care instructions adapted under license by your healthcare professional. If you have questions about a medical condition or this instruction, always ask your healthcare professional. [employer] disclaims any warranty or liability for your use of this information.    Learning About Sleeping Well  What does sleeping well mean?     Sleeping well means getting enough sleep to feel good and stay healthy. How much sleep is enough varies among people.  The number of hours you sleep and how you feel when you wake up are both important. If you do not feel refreshed, you probably need more sleep. Another sign of not getting enough sleep is feeling tired during the day.  Experts recommend that adults get at least 7 or more hours of sleep per day. Children and older adults need more sleep.  Why is getting enough sleep important?  Getting enough quality sleep is a basic part of good health. When your sleep suffers, your physical health, mood, and your thoughts can suffer too. You may find yourself feeling more grumpy or stressed. Not getting enough sleep also can lead to serious problems, " "including injury, accidents, anxiety, and depression.  What might cause poor sleeping?  Many things can cause sleep problems, including:  Changes to your sleep schedule.  Stress. Stress can be caused by fear about a single event, such as giving a speech. Or you may have ongoing stress, such as worry about work or school.  Depression, anxiety, and other mental or emotional conditions.  Changes in your sleep habits or surroundings. This includes changes that happen where you sleep, such as noise, light, or sleeping in a different bed. It also includes changes in your sleep pattern, such as having jet lag or working a late shift.  Health problems, such as pain, breathing problems, and restless legs syndrome.  Lack of regular exercise.  Using alcohol, nicotine, or caffeine before bed.  How can you help yourself?  Here are some tips that may help you sleep more soundly and wake up feeling more refreshed.  Your sleeping area   Use your bedroom only for sleeping and sex. A bit of light reading may help you fall asleep. But if it doesn't, do your reading elsewhere in the house. Try not to use your TV, computer, smartphone, or tablet while you are in bed.  Be sure your bed is big enough to stretch out comfortably, especially if you have a sleep partner.  Keep your bedroom quiet, dark, and cool. Use curtains, blinds, or a sleep mask to block out light. To block out noise, use earplugs, soothing music, or a \"white noise\" machine.  Your evening and bedtime routine   Create a relaxing bedtime routine. You might want to take a warm shower or bath, or listen to soothing music.  Go to bed at the same time every night. And get up at the same time every morning, even if you feel tired.  What to avoid   Limit caffeine (coffee, tea, caffeinated sodas) during the day, and don't have any for at least 6 hours before bedtime.  Avoid drinking alcohol before bedtime. Alcohol can cause you to wake up more often during the night.  Try not to " "smoke or use tobacco, especially in the evening. Nicotine can keep you awake.  Limit naps during the day, especially close to bedtime.  Avoid lying in bed awake for too long. If you can't fall asleep or if you wake up in the middle of the night and can't get back to sleep within about 20 minutes, get out of bed and go to another room until you feel sleepy.  Avoid taking medicine right before bed that may keep you awake or make you feel hyper or energized. Your doctor can tell you if your medicine may do this and if you can take it earlier in the day.  If you can't sleep   Imagine yourself in a peaceful, pleasant scene. Focus on the details and feelings of being in a place that is relaxing.  Get up and do a quiet or boring activity until you feel sleepy.  Avoid drinking any liquids before going to bed to help prevent waking up often to use the bathroom.  Where can you learn more?  Go to https://www.[employer].net/patiented  Enter J942 in the search box to learn more about \"Learning About Sleeping Well.\"  Current as of: July 31, 2024  Content Version: 14.4    [id number] [employer].   Care instructions adapted under license by your healthcare professional. If you have questions about a medical condition or this instruction, always ask your healthcare professional. [employer] disclaims any warranty or liability for your use of this information.       "

## 2025-05-29 NOTE — PROGRESS NOTES
Preventive Care Visit  [facility]  Neelima Patel MD, Family Medicine  May 29, 2025      Assessment & Plan     Encounter for Medicare annual wellness exam  Patient is eligible for RSV, COVID, Tdap and Shingrix vaccines.  She is overdue for her diabetic eye exam.  - PRIMARY CARE FOLLOW-UP SCHEDULING    Bilateral hip pain  Where she describes the pain is in the buttock and hip.  Seems that perhaps she is worsening with time.  She has chronic kidney disease and cannot use NSAIDs.  When they are using maximum Tylenol and she is not adequately controlled for pain, her son-in-law would like to be able to give her a low-dose oxycodone.  He would break it in half.  I will do x-rays.  - XR Pelvis and Hip Bilateral 2 Views  - oxyCODONE (ROXICODONE) 5 MG tablet  Dispense: 6 tablet; Refill: 0    Chronic bilateral low back pain with right-sided sciatica  There does seem to be some element of radiculopathy.  May need follow-up MRI or referral to spine clinic.  - XR Lumbar Spine 2/3 Views  - oxyCODONE (ROXICODONE) 5 MG tablet  Dispense: 6 tablet; Refill: 0    Type 2 diabetes mellitus with stage 3a chronic kidney disease, without long-term current use of insulin (H)  Most recent A1c was 7.3% and 2 prior were 6.9 and 7.3.  Currently only on metformin 1000 mg twice daily.  Will check lab.  - Albumin Random Urine Quantitative with Creat Ratio  - HEMOGLOBIN A1C  - Albumin Random Urine Quantitative with Creat Ratio  - HEMOGLOBIN A1C    Atherosclerosis of native coronary artery of native heart without angina pectoris  Had previously had trouble with coronary artery disease but has not had any problem for many years with cholesterol and blood pressure control.    Cirrhosis of liver without ascites, unspecified hepatic cirrhosis type (H)  Had a bad bout of this in the past but nothing recent.  I believe she had choledocholithiasis.    Portal hypertension (H)  This was seen on some study she had.    Benign  essential hypertension  Not well-controlled on 30 mg lisinopril and 100 mg metoprolol succinate.  Will move up on lisinopril dosing.  Would like her to come in for a nurse only blood pressure recheck.  - Comprehensive metabolic panel (BMP + Alb, Alk Phos, ALT, AST, Total. Bili, TP)  - Comprehensive metabolic panel (BMP + Alb, Alk Phos, ALT, AST, Total. Bili, TP)  - lisinopril (ZESTRIL) 40 MG tablet  Dispense: 90 tablet; Refill: 1    Stage 3a chronic kidney disease (H)  Most recent GFR is 52 with prior being 56.  Will recheck to see trend.  Cannot use NSAIDs for pain unfortunately.    Mixed hyperlipidemia  On simvastatin 20 mg with last LDL of 64.  Will recheck.  - Lipid panel reflex to direct LDL Non-fasting  - Lipid panel reflex to direct LDL Non-fasting    Gastroesophageal reflux disease without esophagitis  Chronically on pantoprazole 40 mg.    Age-related osteoporosis without current pathological fracture  PPI is not helping osteoporosis.  Last DEXA scan was in 2016 and they declined further testing.    Anemia, unspecified type  Most recent hemoglobin was 11.5.    Dry eyes  They need a refill of her eyedrops.  - cycloSPORINE (RESTASIS) 0.05 % ophthalmic emulsion  Dispense: 180 mL; Refill: 1    I will get back to the patient and family on lab results by MyChart or mail and only call with grossly abnormal values.    Patient has been advised of split billing requirements and indicates understanding: Yes    The longitudinal plan of care for the diagnosis(es)/condition(s) as documented were addressed during this visit. Due to the added complexity in care, I will continue to support Myron in the subsequent management and with ongoing continuity of care.    Counseling  Appropriate preventive services were addressed with this patient via screening, questionnaire, or discussion as appropriate for fall prevention, nutrition, physical activity, Tobacco-use cessation, social engagement, weight loss and cognition.   Checklist reviewing preventive services available has been given to the patient.  Reviewed patient's diet, addressing concerns and/or questions.   The patient was instructed to see the dentist every 6 months.   Discussed possible causes of fatigue. The patient was provided with written information regarding signs of hearing loss.       Follow-up   Follow-up in 4 to 6 months for diabetes check   Follow-up Visit   Expected date:  Jun 05, 2026 (Approximate)      Follow Up Appointment Details:     Follow-up with whom?: PCP    Follow-Up for what?: Medicare Wellness    Any Additional Chronic Condition Management?:  Diabetes  Vascular Disease  CKD  Hypertension  Hyperlipidemia       Welcome or Annual?: Annual Wellness    How?: In Person                 Sergo Sanches is a 89 year old, presenting for the following:  Wellness Visit (Annual Wellness Visit. ) and Musculoskeletal Problem (Lower bilateral back pain x 3 months. Pt states pain radiates down her Right leg. )        5/29/2025     5:14 PM   Additional Questions   Roomed by Sherrie ARROYO CMA   Accompanied by Son in law Johnson          Musculoskeletal Problem    This is a patient I last saw on 12/12/2024 for her diabetes check.  At that time her blood pressure was elevated at 163/73.  It continues to be mildly elevated even with her dose changed to 30 mg of lisinopril and metoprolol succinate at 100 mg.  Next increased dose of lisinopril is 40 mg.  She does have mild chronic kidney disease that we are watching.  It has been stable so far.  She has a history of coronary artery disease but has not had any problems for a long time.  She has history of cirrhosis which I believe is secondary to choledocholithiasis.  Studies have shown some mild portal hypertension.  She also has some mild anemia with last check at 11.5 for hemoglobin.  Her most recent hemoglobin A1c was 7.3% which is her normal to high A1c result.  She is currently only on metformin.  For her age this is  adequate.  Today she has a complaint of low back pain for the last few months.  She tells me it radiates down her right leg.  It is intense and comes and goes.    Advance Care Planning    Discussed advance care planning with patient; however, patient declined at this time.        5/26/2025   General Health   How would you rate your overall physical health? Good   Feel stress (tense, anxious, or unable to sleep) Not at all         5/26/2025   Nutrition   Diet: I don't know         5/26/2025   Exercise   Days per week of moderate/strenous exercise 0 days   (!) EXERCISE CONCERN      5/26/2025   Social Factors   Frequency of gathering with friends or relatives Once a week   Worry food won't last until get money to buy more No   Food not last or not have enough money for food? No   Do you have housing? (Housing is defined as stable permanent housing and does not include staying outside in a car, in a tent, in an abandoned building, in an overnight shelter, or couch-surfing.) Yes   Are you worried about losing your housing? No   Lack of transportation? No   Unable to get utilities (heat,electricity)? No         5/26/2025   Fall Risk   Fallen 2 or more times in the past year? No   Trouble with walking or balance? No          5/26/2025   Activities of Daily Living- Home Safety   Needs help with the following daily activites None of the above   Safety concerns in the home None of the above         5/26/2025   Dental   Dentist two times every year? (!) NO         5/26/2025   Hearing Screening   Hearing concerns? (!) I FEEL THAT PEOPLE ARE MUMBLING OR NOT SPEAKING CLEARLY.    (!) I NEED TO ASK PEOPLE TO SPEAK UP OR REPEAT THEMSELVES.       Multiple values from one day are sorted in reverse-chronological order         5/26/2025   Driving Risk Screening   Patient/family members have concerns about driving (!) DECLINE         5/26/2025   General Alertness/Fatigue Screening   Have you been more tired than usual lately? (!) YES          5/26/2025   Urinary Incontinence Screening   Bothered by leaking urine in past 6 months No         Today's PHQ-2 Score:       5/28/2025     7:01 PM   PHQ-2 ( 1999 [employer])   Q1: Little interest or pleasure in doing things 0   Q2: Feeling down, depressed or hopeless 0   PHQ-2 Score 0    Q1: Little interest or pleasure in doing things Not at all   Q2: Feeling down, depressed or hopeless Not at all   PHQ-2 Score 0       Patient-reported           5/26/2025   Substance Use   Alcohol more than 3/day or more than 7/wk Not Applicable   Do you have a current opioid prescription? No   How severe/bad is pain from 1 to 10? 4/10   Do you use any other substances recreationally? No     Social History     Tobacco Use    Smoking status: Never     Passive exposure: Never    Smokeless tobacco: Never   Vaping Use    Vaping status: Never Used   Substance Use Topics    Alcohol use: Never    Drug use: Never          Mammogram Screening - After age 74- determine frequency with patient based on health status, life expectancy and patient goals          Reviewed and updated as needed this visit by Provider                    Past Medical History:   Diagnosis Date    Chronic kidney disease     Chronic reflux esophagitis     Cirrhosis (H)     Coronary artery disease     Hyperlipidemia     Hyperlipidemia     Hypertension     Type II diabetes mellitus (H)      Past Surgical History:   Procedure Laterality Date    COLECTOMY LEFT Left     NE ESOPHAGOGASTRODUODENOSCOPY TRANSORAL DIAGNOSTIC N/A 7/6/2021    Procedure: ESOPHAGOGASTRODUODENOSCOPY (EGD) with Biopsies;  Surgeon: Julio Nix MD;  Location: [facility];  Service: Gastroenterology     Labs reviewed in EPIC  BP Readings from Last 3 Encounters:   05/29/25 (!) 150/81   12/12/24 (!) 163/73   07/11/24 122/82    Wt Readings from Last 3 Encounters:   05/29/25 45.3 kg (99 lb 14.4 oz)   12/12/24 44 kg (97 lb)   07/11/24 44.1 kg (97 lb 3.2 oz)                  Current providers  sharing in care for this patient include:  Patient Care Team:  Neelima Patel MD as PCP - General (Family Practice)  Neelima Patel MD as Assigned PCP    The following health maintenance items are reviewed in Epic and correct as of today:  Health Maintenance   Topic Date Due    HEPATITIS A VACCINE (1 of 2 - Risk 2-dose series) Never done    ZOSTER VACCINE (1 of 2) Never done    HEPATITIS B VACCINE (1 of 3 - Risk 3-dose series) Never done    RSV VACCINE (1 - 1-dose 75+ series) Never done    DTAP/TDAP/TD VACCINE (1 - Tdap) 02/07/2018    EYE EXAM  05/17/2023    COVID-19 VACCINE (8 - 2024-25 season) 09/01/2024    A1C  11/29/2025    DIABETIC FOOT EXAM  12/12/2025    ANNUAL REVIEW OF HM ORDERS  12/12/2025    HEMOGLOBIN  12/12/2025    MEDICARE ANNUAL WELLNESS VISIT  05/29/2026    BMP  05/29/2026    LIPID  05/29/2026    MICROALBUMIN  05/29/2026    FALL RISK ASSESSMENT  05/29/2026    ADVANCE CARE PLANNING  05/29/2030    DEXA  09/07/2031    PHQ-2 (once per calendar year)  Completed    INFLUENZA VACCINE  Completed    PNEUMOCOCCAL VACCINE 50+ YEARS  Completed    URINALYSIS  Completed    HPV VACCINE  Aged Out    MENINGITIS VACCINE  Aged Out        Objective    Exam  BP (!) 150/81 (BP Location: Right arm, Patient Position: Sitting, Cuff Size: Adult Regular)   Pulse 64   Temp 97.9  F (36.6  C) (Oral)   Resp 10   Ht 1.524 m (5')   Wt 45.3 kg (99 lb 14.4 oz)   LMP  (LMP Unknown)   SpO2 98%   Breastfeeding No   BMI 19.51 kg/m     Estimated body mass index is 19.51 kg/m  as calculated from the following:    Height as of this encounter: 1.524 m (5').    Weight as of this encounter: 45.3 kg (99 lb 14.4 oz).    Physical Exam  General appearance - alert, well appearing, and in no distress, low to normal appearing weight, and elderly and somewhat frail  Mental status - normal mood, behavior, speech, dress, motor activity, and thought processes difficult to assess as she does not speak English.  Eyes - pupils equal and  reactive, extraocular eye movements intact  Ears - bilateral TM's and external ear canals normal  Nose - normal and patent, no erythema, discharge   Mouth - mucous membranes moist, pharynx normal without lesions  Neck - supple, no significant adenopathy, carotids upstroke normal bilaterally, no bruits, thyroid exam: thyroid is normal in size without nodules or tenderness  Chest - clear to auscultation, no wheezes, rales or rhonchi, symmetric air entry  Heart - normal rate and regular rhythm  Abdomen -soft, nontender  Breasts - not examined  Pelvic - examination not indicated  Back exam - antalgic gait, limited range of motion, pain with motion noted during exam  Neurological - cranial nerves II through XII intact, unable to assess mental status  Musculoskeletal - no joint tenderness, deformity or swelling  Extremities - peripheral pulses normal, no pedal edema, no clubbing or cyanosis  Skin - normal coloration and turgor, no rashes, no suspicious skin lesions noted          5/29/2025   Mini Cog   Mini-Cog Not Completed (choose reason) Language barrier and no  present       Recent Results (from the past 240 hours)   Albumin Random Urine Quantitative with Creat Ratio    Collection Time: 05/29/25  6:10 PM   Result Value Ref Range    Creatinine Urine mg/dL 44.1 mg/dL    Albumin Urine mg/L 60.8 mg/L    Albumin Urine mg/g Cr 137.87 (H) 0.00 - 25.00 mg/g Cr   HEMOGLOBIN A1C    Collection Time: 05/29/25  6:10 PM   Result Value Ref Range    Estimated Average Glucose 180 (H) <117 mg/dL    Hemoglobin A1C 7.9 (H) 0.0 - 5.6 %   Comprehensive metabolic panel (BMP + Alb, Alk Phos, ALT, AST, Total. Bili, TP)    Collection Time: 05/29/25  6:10 PM   Result Value Ref Range    Sodium 139 135 - 145 mmol/L    Potassium 4.3 3.4 - 5.3 mmol/L    Carbon Dioxide (CO2) 26 22 - 29 mmol/L    Anion Gap 10 7 - 15 mmol/L    Urea Nitrogen 15.8 8.0 - 23.0 mg/dL    Creatinine 0.95 0.51 - 0.95 mg/dL    GFR Estimate 57 (L) >60 mL/min/1.73m2     Calcium 9.4 8.8 - 10.4 mg/dL    Chloride 103 98 - 107 mmol/L    Glucose 215 (H) 70 - 99 mg/dL    Alkaline Phosphatase 68 40 - 150 U/L    AST 30 0 - 45 U/L    ALT 24 0 - 50 U/L    Protein Total 7.9 6.4 - 8.3 g/dL    Albumin 3.7 3.5 - 5.2 g/dL    Bilirubin Total 0.3 <=1.2 mg/dL    Patient Fasting > 8hrs? No    Lipid panel reflex to direct LDL Non-fasting    Collection Time: 05/29/25  6:10 PM   Result Value Ref Range    Cholesterol 157 <200 mg/dL    Triglycerides 284 (H) <150 mg/dL    Direct Measure HDL 44 (L) >=50 mg/dL    LDL Cholesterol Calculated 56 <100 mg/dL    Non HDL Cholesterol 113 <130 mg/dL    Patient Fasting > 8hrs? No            Signed Electronically by: Neelima Patel MD

## 2025-05-31 LAB
ALBUMIN SERPL BCG-MCNC: 3.7 G/DL (ref 3.5–5.2)
ALP SERPL-CCNC: 68 U/L (ref 40–150)
ALT SERPL W P-5'-P-CCNC: 24 U/L (ref 0–50)
ANION GAP SERPL CALCULATED.3IONS-SCNC: 10 MMOL/L (ref 7–15)
AST SERPL W P-5'-P-CCNC: 30 U/L (ref 0–45)
BILIRUB SERPL-MCNC: 0.3 MG/DL
BUN SERPL-MCNC: 15.8 MG/DL (ref 8–23)
CALCIUM SERPL-MCNC: 9.4 MG/DL (ref 8.8–10.4)
CHLORIDE SERPL-SCNC: 103 MMOL/L (ref 98–107)
CHOLEST SERPL-MCNC: 157 MG/DL
CREAT SERPL-MCNC: 0.95 MG/DL (ref 0.51–0.95)
CREAT UR-MCNC: 44.1 MG/DL
EGFRCR SERPLBLD CKD-EPI 2021: 57 ML/MIN/1.73M2
FASTING STATUS PATIENT QL REPORTED: NO
FASTING STATUS PATIENT QL REPORTED: NO
GLUCOSE SERPL-MCNC: 215 MG/DL (ref 70–99)
HCO3 SERPL-SCNC: 26 MMOL/L (ref 22–29)
HDLC SERPL-MCNC: 44 MG/DL
LDLC SERPL CALC-MCNC: 56 MG/DL
MICROALBUMIN UR-MCNC: 60.8 MG/L
MICROALBUMIN/CREAT UR: 137.87 MG/G CR (ref 0–25)
NONHDLC SERPL-MCNC: 113 MG/DL
POTASSIUM SERPL-SCNC: 4.3 MMOL/L (ref 3.4–5.3)
PROT SERPL-MCNC: 7.9 G/DL (ref 6.4–8.3)
SODIUM SERPL-SCNC: 139 MMOL/L (ref 135–145)
TRIGL SERPL-MCNC: 284 MG/DL

## 2025-06-01 ENCOUNTER — RESULTS FOLLOW-UP (OUTPATIENT)
Dept: FAMILY MEDICINE | Facility: CLINIC | Age: 89
End: 2025-06-01
Payer: COMMERCIAL

## 2025-06-01 DIAGNOSIS — R10.11 RUQ ABDOMINAL PAIN: Primary | ICD-10-CM

## 2025-06-07 RX ORDER — LISINOPRIL 40 MG/1
40 TABLET ORAL DAILY
Qty: 90 TABLET | Refills: 1 | Status: SHIPPED | OUTPATIENT
Start: 2025-06-07

## 2025-06-23 ENCOUNTER — TELEPHONE (OUTPATIENT)
Dept: FAMILY MEDICINE | Facility: CLINIC | Age: 89
End: 2025-06-23
Payer: COMMERCIAL

## 2025-06-23 ENCOUNTER — HOSPITAL ENCOUNTER (OUTPATIENT)
Dept: NUCLEAR MEDICINE | Facility: CLINIC | Age: 89
Discharge: HOME | End: 2025-06-23
Attending: FAMILY MEDICINE
Payer: COMMERCIAL

## 2025-06-23 ENCOUNTER — LAB (OUTPATIENT)
Dept: LAB | Facility: CLINIC | Age: 89
End: 2025-06-23
Attending: FAMILY MEDICINE
Payer: COMMERCIAL

## 2025-06-23 DIAGNOSIS — R30.0 DYSURIA: Primary | ICD-10-CM

## 2025-06-23 DIAGNOSIS — N18.30 STAGE 3 CHRONIC KIDNEY DISEASE, UNSPECIFIED WHETHER STAGE 3A OR 3B CKD (H): Primary | Chronic | ICD-10-CM

## 2025-06-23 DIAGNOSIS — R10.11 RUQ ABDOMINAL PAIN: ICD-10-CM

## 2025-06-23 LAB — HOLD SPECIMEN: NORMAL

## 2025-06-23 PROCEDURE — 78227 HEPATOBIL SYST IMAGE W/DRUG: CPT

## 2025-06-23 PROCEDURE — 250N000011 HC RX IP 250 OP 636: Performed by: FAMILY MEDICINE

## 2025-06-23 PROCEDURE — A9537 TC99M MEBROFENIN: HCPCS | Performed by: FAMILY MEDICINE

## 2025-06-23 PROCEDURE — 343N000001 HC RX 343 MED OP 636: Performed by: FAMILY MEDICINE

## 2025-06-23 RX ORDER — KIT FOR THE PREPARATION OF TECHNETIUM TC 99M MEBROFENIN 45 MG/10ML
5 INJECTION, POWDER, LYOPHILIZED, FOR SOLUTION INTRAVENOUS ONCE
Status: DISCONTINUED | OUTPATIENT
Start: 2025-06-23 | End: 2025-06-24 | Stop reason: HOSPADM

## 2025-06-23 RX ORDER — KIT FOR THE PREPARATION OF TECHNETIUM TC 99M MEBROFENIN 45 MG/10ML
7-9 INJECTION, POWDER, LYOPHILIZED, FOR SOLUTION INTRAVENOUS ONCE
Status: COMPLETED | OUTPATIENT
Start: 2025-06-23 | End: 2025-06-23

## 2025-06-23 RX ORDER — SINCALIDE 5 UG/5ML
0.02 INJECTION, POWDER, LYOPHILIZED, FOR SOLUTION INTRAVENOUS ONCE
Status: COMPLETED | OUTPATIENT
Start: 2025-06-23 | End: 2025-06-23

## 2025-06-23 RX ADMIN — MEBROFENIN 8.3 MILLICURIE: 45 INJECTION, POWDER, LYOPHILIZED, FOR SOLUTION INTRAVENOUS at 14:10

## 2025-06-23 RX ADMIN — SINCALIDE 0.9 MCG: 5 INJECTION, POWDER, LYOPHILIZED, FOR SOLUTION INTRAVENOUS at 15:29

## 2025-06-23 NOTE — TELEPHONE ENCOUNTER
Order/Referral Request    Who is requesting: [facility]    Orders being requested: UA     Reason service is needed/diagnosis:     When are orders needed by: ASAP    Has this been discussed with Provider: Yes    Does patient have a preference on a Group/Provider/Facility? [facility]    Does patient have an appointment scheduled?: Yes:     Where to send orders: Place orders within Epic    Patient collected a urine sample and left it at the [facility] need Orders

## 2025-06-24 ENCOUNTER — TELEPHONE (OUTPATIENT)
Dept: FAMILY MEDICINE | Facility: CLINIC | Age: 89
End: 2025-06-24
Payer: COMMERCIAL

## 2025-06-24 ENCOUNTER — RESULTS FOLLOW-UP (OUTPATIENT)
Dept: FAMILY MEDICINE | Facility: CLINIC | Age: 89
End: 2025-06-24
Payer: COMMERCIAL

## 2025-06-24 DIAGNOSIS — R10.84 ABDOMINAL PAIN, GENERALIZED: Primary | ICD-10-CM

## 2025-06-24 DIAGNOSIS — N18.30 STAGE 3 CHRONIC KIDNEY DISEASE, UNSPECIFIED WHETHER STAGE 3A OR 3B CKD (H): Primary | ICD-10-CM

## 2025-06-24 NOTE — TELEPHONE ENCOUNTER
Please call the patient's family and let them know that although the order was put in for the urinalysis within 2 hours after she left the urine, the [facility] lab never performed the testing.  They can certainly bring more urine in, the order is still sitting there.

## 2025-06-25 ENCOUNTER — LAB (OUTPATIENT)
Dept: LAB | Facility: CLINIC | Age: 89
End: 2025-06-25
Payer: COMMERCIAL

## 2025-06-25 DIAGNOSIS — R30.0 DYSURIA: ICD-10-CM

## 2025-06-25 LAB
ALBUMIN UR-MCNC: ABNORMAL MG/DL
APPEARANCE UR: CLEAR
BACTERIA #/AREA URNS HPF: ABNORMAL /HPF
BILIRUB UR QL STRIP: NEGATIVE
COLOR UR AUTO: YELLOW
GLUCOSE UR STRIP-MCNC: NEGATIVE MG/DL
HGB UR QL STRIP: NEGATIVE
KETONES UR STRIP-MCNC: NEGATIVE MG/DL
LEUKOCYTE ESTERASE UR QL STRIP: ABNORMAL
NITRATE UR QL: NEGATIVE
PH UR STRIP: 6 [PH] (ref 5–8)
RBC #/AREA URNS AUTO: ABNORMAL /HPF
SP GR UR STRIP: >=1.03 (ref 1–1.03)
SQUAMOUS #/AREA URNS AUTO: ABNORMAL /LPF
UROBILINOGEN UR STRIP-ACNC: 0.2 E.U./DL
WBC #/AREA URNS AUTO: ABNORMAL /HPF

## 2025-06-25 PROCEDURE — 81001 URINALYSIS AUTO W/SCOPE: CPT

## 2025-06-30 ENCOUNTER — RESULTS FOLLOW-UP (OUTPATIENT)
Dept: FAMILY MEDICINE | Facility: CLINIC | Age: 89
End: 2025-06-30

## 2025-06-30 DIAGNOSIS — E78.2 MIXED HYPERLIPIDEMIA: Chronic | ICD-10-CM

## 2025-06-30 DIAGNOSIS — E11.22 TYPE 2 DIABETES MELLITUS WITH STAGE 3A CHRONIC KIDNEY DISEASE, WITHOUT LONG-TERM CURRENT USE OF INSULIN (H): ICD-10-CM

## 2025-06-30 DIAGNOSIS — N18.31 TYPE 2 DIABETES MELLITUS WITH STAGE 3A CHRONIC KIDNEY DISEASE, WITHOUT LONG-TERM CURRENT USE OF INSULIN (H): ICD-10-CM

## 2025-06-30 DIAGNOSIS — I10 BENIGN ESSENTIAL HYPERTENSION: Chronic | ICD-10-CM

## 2025-06-30 RX ORDER — LISINOPRIL 40 MG/1
40 TABLET ORAL DAILY
Qty: 90 TABLET | Refills: 1 | Status: CANCELLED | OUTPATIENT
Start: 2025-06-30

## 2025-06-30 RX ORDER — LISINOPRIL 30 MG/1
30 TABLET ORAL DAILY
Qty: 90 TABLET | Refills: 1 | OUTPATIENT
Start: 2025-06-30

## 2025-06-30 RX ORDER — SIMVASTATIN 20 MG
20 TABLET ORAL AT BEDTIME
Qty: 90 TABLET | Refills: 2 | Status: SHIPPED | OUTPATIENT
Start: 2025-06-30

## 2025-06-30 NOTE — TELEPHONE ENCOUNTER
General Call    Contacts       Contact Date/Time Type Contact Phone/Fax    06/30/2025 12:20 AM CDT Interface (Incoming) [facility] [id number] [facility] - [facility] [address] (Pharmacy) [phone]    06/30/2025 11:31 AM CDT Phone (Incoming) Alex Leonard (Emergency Contact) [phone] (M)          Reason for Call:  Lisinopril     What are your questions or concerns:  Pt's Son in law, on HYACINTH, said that [facility] stated they never received refill on 6/7/2025 and when requested today, it was denied.    Please send a new RX for     Disp Refills Start End JEROMY   lisinopril (ZESTRIL) 40 MG tablet 90 tablet 1 6/7/2025 -- No   Sig - Route: Take 1 tablet (40 mg) by mouth daily. - Oral           If any questions, please call Pt's son in law Alex Leonard [phone]    Feliz Rocha

## 2025-06-30 NOTE — TELEPHONE ENCOUNTER
A call to the patient's pharmacy was placed where it was verified they do have lisinopril on file. The other medications needing refill were sent in and the patient's son in law was made aware.    Rigo Vasquez RN  [facility]

## (undated) RX ORDER — SINCALIDE 5 UG/5ML
INJECTION, POWDER, LYOPHILIZED, FOR SOLUTION INTRAVENOUS
Status: DISPENSED
Start: 2025-06-23